# Patient Record
Sex: MALE | Race: WHITE | NOT HISPANIC OR LATINO | Employment: PART TIME | URBAN - METROPOLITAN AREA
[De-identification: names, ages, dates, MRNs, and addresses within clinical notes are randomized per-mention and may not be internally consistent; named-entity substitution may affect disease eponyms.]

---

## 2017-01-30 ENCOUNTER — OFFICE VISIT (OUTPATIENT)
Dept: FAMILY MEDICINE | Facility: CLINIC | Age: 52
End: 2017-01-30
Payer: COMMERCIAL

## 2017-01-30 VITALS
OXYGEN SATURATION: 98 % | HEART RATE: 97 BPM | DIASTOLIC BLOOD PRESSURE: 72 MMHG | WEIGHT: 180 LBS | HEIGHT: 68 IN | SYSTOLIC BLOOD PRESSURE: 130 MMHG | TEMPERATURE: 98.2 F | BODY MASS INDEX: 27.28 KG/M2

## 2017-01-30 DIAGNOSIS — Z12.11 SCREEN FOR COLON CANCER: ICD-10-CM

## 2017-01-30 DIAGNOSIS — Z13.220 NEED FOR LIPID SCREENING: ICD-10-CM

## 2017-01-30 DIAGNOSIS — Z13.1 SCREENING FOR DIABETES MELLITUS: ICD-10-CM

## 2017-01-30 DIAGNOSIS — Z12.5 SCREENING FOR PROSTATE CANCER: ICD-10-CM

## 2017-01-30 DIAGNOSIS — Z00.00 ENCOUNTER FOR ROUTINE ADULT HEALTH EXAMINATION WITHOUT ABNORMAL FINDINGS: Primary | ICD-10-CM

## 2017-01-30 DIAGNOSIS — L23.9 ALLERGIC CONTACT DERMATITIS, UNSPECIFIED TRIGGER: ICD-10-CM

## 2017-01-30 LAB
CHOLEST SERPL-MCNC: 117 MG/DL
GLUCOSE SERPL-MCNC: 95 MG/DL (ref 70–99)
HDLC SERPL-MCNC: 43 MG/DL
LDLC SERPL CALC-MCNC: 57 MG/DL
NONHDLC SERPL-MCNC: 74 MG/DL
PSA SERPL-ACNC: 2.08 UG/L (ref 0–4)
TRIGL SERPL-MCNC: 84 MG/DL

## 2017-01-30 PROCEDURE — 80061 LIPID PANEL: CPT | Performed by: FAMILY MEDICINE

## 2017-01-30 PROCEDURE — 99396 PREV VISIT EST AGE 40-64: CPT | Performed by: FAMILY MEDICINE

## 2017-01-30 PROCEDURE — 82947 ASSAY GLUCOSE BLOOD QUANT: CPT | Performed by: FAMILY MEDICINE

## 2017-01-30 PROCEDURE — G0103 PSA SCREENING: HCPCS | Performed by: FAMILY MEDICINE

## 2017-01-30 PROCEDURE — 36415 COLL VENOUS BLD VENIPUNCTURE: CPT | Performed by: FAMILY MEDICINE

## 2017-01-30 RX ORDER — MOMETASONE FUROATE 1 MG/G
OINTMENT TOPICAL
Qty: 45 G | Refills: 3 | Status: SHIPPED | OUTPATIENT
Start: 2017-01-30 | End: 2024-03-27

## 2017-01-30 NOTE — Clinical Note
[facility]  [address] [place] [address]    January 31, 2017    Pop Eaton III  [address] [address]          Dear Pop,    All of your lab tests are normal. Continue your healthy lifestyle.    Enclosed is a copy of your results.     Results for orders placed or performed in visit on 01/30/17   GLUCOSE   Result Value Ref Range    Glucose 95 70 - 99 mg/dL   Lipid panel reflex to direct LDL   Result Value Ref Range    Cholesterol 117 <200 mg/dL    Triglycerides 84 <150 mg/dL    HDL Cholesterol 43 >39 mg/dL    LDL Cholesterol Calculated 57 <100 mg/dL    Non HDL Cholesterol 74 <130 mg/dL   PSA, screen   Result Value Ref Range    PSA 2.08 0 - 4 ug/L       If you have any questions or concerns, please call myself or my nurse at [phone].      Sincerely,        Vandana Cole MD/KAPLAN

## 2017-01-30 NOTE — NURSING NOTE
"Chief Complaint   Patient presents with     Physical       Initial /72 mmHg  Pulse 97  Temp(Src) 98.2  F (36.8  C)  Ht 5' 7.9\" (1.725 m)  Wt 180 lb (81.647 kg)  BMI 27.44 kg/m2  SpO2 98% Estimated body mass index is 27.44 kg/(m^2) as calculated from the following:    Height as of this encounter: 5' 7.9\" (1.725 m).    Weight as of this encounter: 180 lb (81.647 kg).  BP completed using cuff size: kasey Jeffery MA      "

## 2017-01-30 NOTE — PATIENT INSTRUCTIONS
Preventive Health Recommendations  Male Ages 50   64    Yearly exam:             See your health care provider every year in order to  o   Review health changes.   o   Discuss preventive care.    o   Review your medicines if your doctor has prescribed any.     Have a cholesterol test every 5 years, or more frequently if you are at risk for high cholesterol/heart disease.     Have a diabetes test (fasting glucose) every three years. If you are at risk for diabetes, you should have this test more often.     Have a colonoscopy at age 50, or have a yearly FIT test (stool test). These exams will check for colon cancer.      Talk with your health care provider about whether or not a prostate cancer screening test (PSA) is right for you.    You should be tested each year for STDs (sexually transmitted diseases), if you re at risk.     Shots: Get a flu shot each year. Get a tetanus shot every 10 years.     Nutrition:    Eat at least 5 servings of fruits and vegetables daily.     Eat whole-grain bread, whole-wheat pasta and brown rice instead of white grains and rice.     Talk to your provider about Calcium and Vitamin D.     Lifestyle    Exercise for at least 150 minutes a week (30 minutes a day, 5 days a week). This will help you control your weight and prevent disease.     Limit alcohol to one drink per day.     No smoking.     Wear sunscreen to prevent skin cancer.     See your dentist every six months for an exam and cleaning.     See your eye doctor every 1 to 2 years.    [facility]    If you have any questions regarding to your visit please contact your care team:       Team Purple:   Clinic Hours Telephone Number   LETITIA Gupta Dr., Dr.   7am-7pm  Monday - Thursday   7am-5pm  Fridays  [phone]  (Appointment scheduling available 24/7)    Questions about your Visit?   Team Line:  [phone]   Urgent Care - [place]  [place] - 11am-9pm Monday-Friday Saturday-Sunday- 9am-5pm   [place] - 5pm-9pm Monday-Friday Saturday-Sunday- 9am-5pm  [phone] - [place]   [phone] - [place]       What options do I have for visits at the clinic other than the traditional office visit?  To expand how we care for you, many of our providers are utilizing electronic visits (e-visits) and telephone visits, when medically appropriate, for interactions with their patients rather than a visit in the clinic.   We also offer nurse visits for many medical concerns. Just like any other service, we will bill your insurance company for this type of visit based on time spent on the phone with your provider. Not all insurance companies cover these visits. Please check with your medical insurance if this type of visit is covered. You will be responsible for any charges that are not paid by your insurance.      E-visits via Dacentec:  generally incur a $35.00 fee.  Telephone visits:  Time spent on the phone: *charged based on time that is spent on the phone in increments of 10 minutes. Estimated cost:   5-10 mins $30.00   11-20 mins. $59.00   21-30 mins. $85.00     Use Gryphon Networkst (secure email communication and access to your chart) to send your primary care provider a message or make an appointment. Ask someone on your Team how to sign up for Dacentec.  For a Price Quote for your services, please call our Consumer Price Line at [phone].  As always, Thank you for trusting us with your health care needs!

## 2017-01-30 NOTE — MR AVS SNAPSHOT
After Visit Summary   1/30/2017    Pop Eaton III    MRN: [id number]           Patient Information     Date Of Birth          1965        Visit Information        Provider Department      1/30/2017 10:30 AM Vandana Cole MD [facility]        Today's Diagnoses     Encounter for routine adult health examination without abnormal findings    -  1     Screen for colon cancer         Screening for prostate cancer         Screening for diabetes mellitus         Allergic contact dermatitis, unspecified trigger         Need for lipid screening           Care Instructions      Preventive Health Recommendations  Male Ages 50 - 64    Yearly exam:             See your health care provider every year in order to  o   Review health changes.   o   Discuss preventive care.    o   Review your medicines if your doctor has prescribed any.     Have a cholesterol test every 5 years, or more frequently if you are at risk for high cholesterol/heart disease.     Have a diabetes test (fasting glucose) every three years. If you are at risk for diabetes, you should have this test more often.     Have a colonoscopy at age 50, or have a yearly FIT test (stool test). These exams will check for colon cancer.      Talk with your health care provider about whether or not a prostate cancer screening test (PSA) is right for you.    You should be tested each year for STDs (sexually transmitted diseases), if you re at risk.     Shots: Get a flu shot each year. Get a tetanus shot every 10 years.     Nutrition:    Eat at least 5 servings of fruits and vegetables daily.     Eat whole-grain bread, whole-wheat pasta and brown rice instead of white grains and rice.     Talk to your provider about Calcium and Vitamin D.     Lifestyle    Exercise for at least 150 minutes a week (30 minutes a day, 5 days a week). This will help you control your weight and prevent disease.     Limit alcohol to one drink per day.      No smoking.     Wear sunscreen to prevent skin cancer.     See your dentist every six months for an exam and cleaning.     See your eye doctor every 1 to 2 years.    [facility]    If you have any questions regarding to your visit please contact your care team:       Team Purple:   Clinic Hours Telephone Number   Dr. aVndana Domínguez, PA   7am-7pm  Monday - Thursday   7am-5pm  Fridays  [phone]  (Appointment scheduling available 24/7)    Questions about your Visit?   Team Line:  [phone]   Urgent Care - [place] - 11am-9pm Monday-Friday Saturday-Sunday- 9am-5pm   [place] - 5pm-9pm Monday-Friday Saturday-Sunday- 9am-5pm  [phone] - [place]   [phone] - [place]       What options do I have for visits at the clinic other than the traditional office visit?  To expand how we care for you, many of our providers are utilizing electronic visits (e-visits) and telephone visits, when medically appropriate, for interactions with their patients rather than a visit in the clinic.   We also offer nurse visits for many medical concerns. Just like any other service, we will bill your insurance company for this type of visit based on time spent on the phone with your provider. Not all insurance companies cover these visits. Please check with your medical insurance if this type of visit is covered. You will be responsible for any charges that are not paid by your insurance.      E-visits via Candid io:  generally incur a $35.00 fee.  Telephone visits:  Time spent on the phone: *charged based on time that is spent on the phone in increments of 10 minutes. Estimated cost:   5-10 mins $30.00   11-20 mins. $59.00   21-30 mins. $85.00     Use Candid io (secure email communication and access to your chart) to send your primary care provider a message or make an appointment. Ask someone on your Team how to sign up for  Solarmass.  For a Price Quote for your services, please call our Consumer Price Line at [phone].  As always, Thank you for trusting us with your health care needs!            Follow-ups after your visit        Additional Services     GASTROENTEROLOGY ADULT REF PROCEDURE ONLY       Last Lab Result: No results found for: CR  Body mass index is 27.44 kg/(m^2).      Patient will be contacted to schedule procedure.     Please be aware that coverage of these services is subject to the terms and limitations of your health insurance plan.  Call member services at your health plan with any benefit or coverage questions.  Any procedures must be performed at a [place] facility OR coordinated by your clinic's referral office.    Please bring the following with you to your appointment:    (1) Any X-Rays, CTs or MRIs which have been performed.  Contact the facility where they were done to arrange for  prior to your scheduled appointment.    (2) List of current medications   (3) This referral request   (4) Any documents/labs given to you for this referral                  Who to contact     If you have questions or need follow up information about today's clinic visit or your schedule please contact [facility] directly at [phone].  Normal or non-critical lab and imaging results will be communicated to you by IntegenXhart, letter or phone within 4 business days after the clinic has received the results. If you do not hear from us within 7 days, please contact the clinic through IntegenXhart or phone. If you have a critical or abnormal lab result, we will notify you by phone as soon as possible.  Submit refill requests through Solarmass or call your pharmacy and they will forward the refill request to us. Please allow 3 business days for your refill to be completed.          Additional Information About Your Visit        Solarmass Information     Solarmass lets you send messages to your doctor, view your test results,  "renew your prescriptions, schedule appointments and more. To sign up, go to [email or web address] . Click on \"Log in\" on the left side of the screen, which will take you to the Welcome page. Then click on \"Sign up Now\" on the right side of the page.     You will be asked to enter the access code listed below, as well as some personal information. Please follow the directions to create your username and password.     Your access code is: [email or web address]  Expires: 2017 11:11 AM     Your access code will  in 90 days. If you need help or a new code, please call your [place] clinic or [phone].        Care EveryWhere ID     This is your Care EveryWhere ID. This could be used by other organizations to access your [place] medical records  [id number]        Your Vitals Were     Pulse Temperature Height BMI (Body Mass Index) Pulse Oximetry       97 98.2  F (36.8  C) 5' 7.9\" (1.725 m) 27.44 kg/m2 98%        Blood Pressure from Last 3 Encounters:   17 130/72   16 118/74    Weight from Last 3 Encounters:   17 180 lb (81.647 kg)   16 170 lb (77.111 kg)              We Performed the Following     GASTROENTEROLOGY ADULT REF PROCEDURE ONLY     GLUCOSE     Lipid panel reflex to direct LDL     PSA, screen          Today's Medication Changes          These changes are accurate as of: 17 11:11 AM.  If you have any questions, ask your nurse or doctor.               Start taking these medicines.        Dose/Directions    mometasone 0.1 % ointment   Commonly known as:  ELOCON   Used for:  Allergic contact dermatitis, unspecified trigger   Started by:  Vandana Cole MD        Apply sparingly to affected area twice daily as needed.  Do not apply to face.   Quantity:  45 g   Refills:  3            Where to get your medicines      These medications were sent to [facility] [address] [place] [address] [place] [address] [address] [facility] [address] [facility] [address] [id number]     Phone:  [phone]    - mometasone 0.1 % ointment             Primary Care Provider    None Specified       No primary provider on file.        Thank you!     Thank you for choosing [facility]  for your care. Our goal is always to provide you with excellent care. Hearing back from our patients is one way we can continue to improve our services. Please take a few minutes to complete the written survey that you may receive in the mail after your visit with us. Thank you!             Your Updated Medication List - Protect others around you: Learn how to safely use, store and throw away your medicines at www.disposemymeds.org.          This list is accurate as of: 1/30/17 11:11 AM.  Always use your most recent med list.                   Brand Name Dispense Instructions for use    mometasone 0.1 % ointment    ELOCON    45 g    Apply sparingly to affected area twice daily as needed.  Do not apply to face.

## 2017-01-30 NOTE — PROGRESS NOTES
SUBJECTIVE:     CC: Pop Eaton III is an 51 year old male who presents for preventative health visit.     Healthy Habits:    Do you get at least three servings of calcium containing foods daily (dairy, green leafy vegetables, etc.)? yes    Amount of exercise or daily activities, outside of work: 7 day(s) per week    Problems taking medications regularly not applicable    Medication side effects: No    Have you had an eye exam in the past two years? yes    Do you see a dentist twice per year? yes    Do you have sleep apnea, excessive snoring or daytime drowsiness?no             Today's PHQ-2 Score:   PHQ-2 ( 1999 Pfizer) 1/30/2017 9/28/2016   Q1: Little interest or pleasure in doing things 0 0   Q2: Feeling down, depressed or hopeless 2 3   PHQ-2 Score 2 3       Abuse: Current or Past(Physical, Sexual or Emotional)- No  Do you feel safe in your environment - Yes    Social History   Substance Use Topics     Smoking status: Current Every Day Smoker     Smokeless tobacco: Never Used     Alcohol Use: No     The patient does not drink >3 drinks per day nor >7 drinks per week.    Last PSA: No results found for: PSA    Recent Labs   Lab Test 06/02/15   CHOL  138   HDL  33*   LDL  71   TRIG  170*   NHDL  105       Reviewed orders with patient. Reviewed health maintenance and updated orders accordingly - Yes    All Histories reviewed and updated in Epic.      ROS:  This 51 year old male is here today for annual male exam. He is aware that he is due for colonoscopy. 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 He hopes to get clearance to go back to work in about 1 week. It is a very physical job. He is requesting a PSA test. He has 2 maternal uncles with prostate cancer. His father is still living with no health issues.  He had a little cream in his coffee this morning.  All other review of systems are negative  Personal, family, and social  "history reviewed with patient and revised.    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 He also has been having very itchy skin around his anus lately. Keeps him awake at night.   E: NEGATIVE for vision changes or irritation  ENT: NEGATIVE for ear, mouth and throat problems  R: NEGATIVE for significant cough or SOB  CV: NEGATIVE for chest pain, palpitations or peripheral edema  GI: NEGATIVE for nausea, abdominal pain, heartburn, or change in bowel habits   male: negative for dysuria, hematuria, decreased urinary stream, erectile dysfunction, urethral discharge  M: NEGATIVE for significant arthralgias or myalgia  N: NEGATIVE for weakness, dizziness or paresthesias  P: NEGATIVE for changes in mood or affect    Problem list, Medication list, Allergies, and Medical/Social/Surgical histories reviewed in EPIC and updated as appropriate.  BP Readings from Last 3 Encounters:   01/30/17 130/72   09/28/16 118/74    Wt Readings from Last 3 Encounters:   01/30/17 180 lb (81.647 kg)   09/28/16 170 lb (77.111 kg)                  Patient Active Problem List   Diagnosis     S/P arthroscopic partial medial meniscectomy     Past Surgical History   Procedure Laterality Date     Laparoscopic appendectomy  2010     Laparoscopic cholecystectomy  2010     Arthroscopy knee rt/lt Left 2013     meniscal tear      Surgical history of -  Left 1995     corneal transplant X 2 on left eye       Social History   Substance Use Topics     Smoking status: Current Every Day Smoker     Smokeless tobacco: Never Used     Alcohol Use: No     Family History   Problem Relation Age of Onset     Prostate Cancer Maternal Uncle      X2         OBJECTIVE:     /72 mmHg  Pulse 97  Temp(Src) 98.2  F (36.8  C)  Ht 5' 7.9\" (1.725 m)  " Wt 180 lb (81.647 kg)  BMI 27.44 kg/m2  SpO2 98%  EXAM:  GENERAL: healthy, alert and no distress  EYES: Eyes grossly normal to inspection, PERRL and conjunctivae and sclerae normal  HENT: ear canals and TM's normal, nose and mouth without ulcers or lesions  NECK: no adenopathy, no asymmetry, masses, or scars and thyroid normal to palpation  RESP: lungs clear to auscultation - no rales, rhonchi or wheezes  CV: regular rate and rhythm, normal S1 S2, no S3 or S4, no murmur, click or rub, no peripheral edema and peripheral pulses strong  ABDOMEN: soft, nontender, no hepatosplenomegaly, no masses and bowel sounds normal  MS: no gross musculoskeletal defects noted, no edema  SKIN: no suspicious lesions or rashes, but has severe contact dermatitis with excoriations and scabs in the hairs around his anus, but not too close to his anus. He needs a steroid ointment to calm this down. He also has very thick white toenails and right thumb nail as noted above.   NEURO: Normal strength and tone, mentation intact and speech normal  PSYCH: mentation appears normal, affect normal/bright    ASSESSMENT/PLAN:     1. Encounter for routine adult health examination without abnormal findings  Healthy man     2. Screen for colon cancer  due  - GASTROENTEROLOGY ADULT REF PROCEDURE ONLY    3. Screening for prostate cancer  As above   - PSA, screen    4. Screening for diabetes mellitus  due  - GLUCOSE    5. Allergic contact dermatitis, unspecified trigger  As above   - mometasone (ELOCON) 0.1 % ointment; Apply sparingly to affected area twice daily as needed.  Do not apply to face.  Dispense: 45 g; Refill: 3    6. Need for lipid screening  Due, he had some cream in his coffee.   - Lipid panel reflex to direct LDL    COUNSELING:  Reviewed preventive health counseling, as reflected in patient instructions       Regular exercise       Healthy diet/nutrition         reports that he has been smoking.  He has never used smokeless  "tobacco.    Estimated body mass index is 27.44 kg/(m^2) as calculated from the following:    Height as of this encounter: 5' 7.9\" (1.725 m).    Weight as of this encounter: 180 lb (81.647 kg).   Weight management plan: Discussed healthy diet and exercise guidelines and patient will follow up in 12 months in clinic to re-evaluate.    Counseling Resources:  ATP IV Guidelines  Pooled Cohorts Equation Calculator  FRAX Risk Assessment  ICSI Preventive Guidelines  Dietary Guidelines for Americans, 2010  USDA's MyPlate  ASA Prophylaxis  Lung CA Screening    EB MANZANO MD  [facility]  "

## 2017-01-31 ASSESSMENT — PATIENT HEALTH QUESTIONNAIRE - PHQ9: SUM OF ALL RESPONSES TO PHQ QUESTIONS 1-9: 14

## 2017-02-15 ENCOUNTER — HOSPITAL ENCOUNTER (OUTPATIENT)
Facility: AMBULATORY SURGERY CENTER | Age: 52
Discharge: HOME | End: 2017-02-15
Attending: SURGERY | Admitting: SURGERY
Payer: COMMERCIAL

## 2017-02-15 ENCOUNTER — SURGERY (OUTPATIENT)
Age: 52
End: 2017-02-15

## 2017-02-15 VITALS
HEIGHT: 68 IN | DIASTOLIC BLOOD PRESSURE: 80 MMHG | RESPIRATION RATE: 16 BRPM | SYSTOLIC BLOOD PRESSURE: 130 MMHG | BODY MASS INDEX: 25.76 KG/M2 | OXYGEN SATURATION: 98 % | WEIGHT: 170 LBS | TEMPERATURE: 98.7 F

## 2017-02-15 PROBLEM — Z86.0100 HISTORY OF COLONIC POLYPS: Status: ACTIVE | Noted: 2017-02-15

## 2017-02-15 LAB — COLONOSCOPY: NORMAL

## 2017-02-15 PROCEDURE — G8907 PT DOC NO EVENTS ON DISCHARG: HCPCS

## 2017-02-15 PROCEDURE — 45385 COLONOSCOPY W/LESION REMOVAL: CPT | Mod: PT | Performed by: SURGERY

## 2017-02-15 PROCEDURE — 88305 TISSUE EXAM BY PATHOLOGIST: CPT | Performed by: SURGERY

## 2017-02-15 PROCEDURE — 45385 COLONOSCOPY W/LESION REMOVAL: CPT

## 2017-02-15 PROCEDURE — 45380 COLONOSCOPY AND BIOPSY: CPT | Mod: 59 | Performed by: SURGERY

## 2017-02-15 PROCEDURE — 45380 COLONOSCOPY AND BIOPSY: CPT | Mod: XS

## 2017-02-15 PROCEDURE — G8918 PT W/O PREOP ORDER IV AB PRO: HCPCS

## 2017-02-15 RX ORDER — ONDANSETRON 2 MG/ML
4 INJECTION INTRAMUSCULAR; INTRAVENOUS
Status: DISCONTINUED | OUTPATIENT
Start: 2017-02-15 | End: 2017-02-16 | Stop reason: HOSPADM

## 2017-02-15 RX ORDER — LIDOCAINE 40 MG/G
CREAM TOPICAL
Status: DISCONTINUED | OUTPATIENT
Start: 2017-02-15 | End: 2017-02-16 | Stop reason: HOSPADM

## 2017-02-15 RX ORDER — FENTANYL CITRATE 50 UG/ML
INJECTION, SOLUTION INTRAMUSCULAR; INTRAVENOUS PRN
Status: DISCONTINUED | OUTPATIENT
Start: 2017-02-15 | End: 2017-02-15 | Stop reason: HOSPADM

## 2017-02-15 RX ADMIN — FENTANYL CITRATE 50 MCG: 50 INJECTION, SOLUTION INTRAMUSCULAR; INTRAVENOUS at 11:47

## 2017-02-15 RX ADMIN — FENTANYL CITRATE 50 MCG: 50 INJECTION, SOLUTION INTRAMUSCULAR; INTRAVENOUS at 11:46

## 2017-02-15 RX ADMIN — FENTANYL CITRATE 50 MCG: 50 INJECTION, SOLUTION INTRAMUSCULAR; INTRAVENOUS at 11:55

## 2017-02-15 RX ADMIN — FENTANYL CITRATE 50 MCG: 50 INJECTION, SOLUTION INTRAMUSCULAR; INTRAVENOUS at 11:51

## 2017-02-15 RX ADMIN — FENTANYL CITRATE 50 MCG: 50 INJECTION, SOLUTION INTRAMUSCULAR; INTRAVENOUS at 12:13

## 2017-02-15 RX ADMIN — FENTANYL CITRATE 50 MCG: 50 INJECTION, SOLUTION INTRAMUSCULAR; INTRAVENOUS at 13:25

## 2017-02-17 LAB — COPATH REPORT: NORMAL

## 2017-04-11 NOTE — PROGRESS NOTES
"Chief Complaint   Patient presents with     Knee Pain     right knee pain     Diarrhea     has been going on for years     Allergies     SUBJECTIVE:  This 51 year old male is here today for several reasons:  1. He has had problems with diarrhea after eating for many years. He had colonoscopy with removal of polyps last fall and will need another colonoscopy in 3 years. No other pathology was found. 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 He deliberately doesn't eat lunch as he doesn't want to have diarrhea in people's homes. He wonders what can be done for that. He is able to sleep through the night without loose stools.   2. He has been seeing Dr. Russell for his right knee pain. He had arthroscopy, but that didn't help much. He was told that he has osteoarthritis and he has had about 3 steroid injections. Last one only helped about 2 days. Dr. Russell tried to get his insurance to cover another injection, but it was denied. Patient is taking 1960 mg tylenol every morning prior to work. That seems to help him get through the day. He has to do a lot of kneeling for his job.   3. He needs a refill of flonase for his seasonal allergies.   All other review of systems are negative  Personal, family, and social history reviewed with patient and revised.    OBJECTIVE:  Vital signs:  Temp: 98.2  F (36.8  C)   BP: 136/72 Pulse: 83     SpO2: 98 %     Height: 5' 8\" (172.7 cm) Weight: 177 lb (80.3 kg)  Estimated body mass index is 26.91 kg/(m^2) as calculated from the following:    Height as of this encounter: 5' 8\" (1.727 m).    Weight as of this encounter: 177 lb (80.3 kg).       patient has normal gait. No limp. No swelling noted in his right knee  Abdomen: soft and non-tender with no masses. No hepatosplenomegaly  Well hydrated  Well nourished  Well groomed  Alert and oriented X 3  Good spirits  Brisk gait   ASSESSMENT / PLAN:  (J30.2) Seasonal allergic rhinitis, unspecified allergic " rhinitis trigger  (primary encounter diagnosis)  Comment: as above   Plan: fluticasone (FLONASE) 50 MCG/ACT spray         Refilled     (K58.0) Irritable bowel syndrome with diarrhea  Comment: as above, he has never tried a bulk former  Plan: psyllium (METAMUCIL) 58.6 % POWD         Take one tablespoon daily.   Call me if that doesn't work, then I will change to cholestyramine powder titrated     (M17.11) Primary osteoarthritis of right knee  Comment: as above   Plan: acetaminophen (TYLENOL ARTHRITIS PAIN) 650 MG         CR tablet, naproxen (NAPROSYN) 500 MG tablet,         Basic metabolic panel             Patient needs to go back to see his orthopedist and see if he can get a synvisc injection. He needs to keep connected with him until he needs a knee replacement, which is not known when that will be.     EB MANZANO M.D.

## 2017-04-17 ENCOUNTER — OFFICE VISIT (OUTPATIENT)
Dept: FAMILY MEDICINE | Facility: CLINIC | Age: 52
End: 2017-04-17
Payer: COMMERCIAL

## 2017-04-17 VITALS
BODY MASS INDEX: 26.83 KG/M2 | DIASTOLIC BLOOD PRESSURE: 72 MMHG | TEMPERATURE: 98.2 F | SYSTOLIC BLOOD PRESSURE: 136 MMHG | WEIGHT: 177 LBS | HEART RATE: 83 BPM | OXYGEN SATURATION: 98 % | HEIGHT: 68 IN

## 2017-04-17 DIAGNOSIS — K58.0 IRRITABLE BOWEL SYNDROME WITH DIARRHEA: ICD-10-CM

## 2017-04-17 DIAGNOSIS — M17.11 PRIMARY OSTEOARTHRITIS OF RIGHT KNEE: ICD-10-CM

## 2017-04-17 DIAGNOSIS — J30.2 SEASONAL ALLERGIC RHINITIS, UNSPECIFIED ALLERGIC RHINITIS TRIGGER: Primary | ICD-10-CM

## 2017-04-17 LAB
ANION GAP SERPL CALCULATED.3IONS-SCNC: 8 MMOL/L (ref 3–14)
BUN SERPL-MCNC: 11 MG/DL (ref 7–30)
CALCIUM SERPL-MCNC: 8.7 MG/DL (ref 8.5–10.1)
CHLORIDE SERPL-SCNC: 108 MMOL/L (ref 94–109)
CO2 SERPL-SCNC: 26 MMOL/L (ref 20–32)
CREAT SERPL-MCNC: 1.09 MG/DL (ref 0.66–1.25)
GFR SERPL CREATININE-BSD FRML MDRD: 71 ML/MIN/1.7M2
GLUCOSE SERPL-MCNC: 85 MG/DL (ref 70–99)
POTASSIUM SERPL-SCNC: 4.1 MMOL/L (ref 3.4–5.3)
SODIUM SERPL-SCNC: 142 MMOL/L (ref 133–144)

## 2017-04-17 PROCEDURE — 36415 COLL VENOUS BLD VENIPUNCTURE: CPT | Performed by: FAMILY MEDICINE

## 2017-04-17 PROCEDURE — 99214 OFFICE O/P EST MOD 30 MIN: CPT | Performed by: FAMILY MEDICINE

## 2017-04-17 PROCEDURE — 80048 BASIC METABOLIC PNL TOTAL CA: CPT | Performed by: FAMILY MEDICINE

## 2017-04-17 RX ORDER — SENNOSIDES 8.6 MG
650 CAPSULE ORAL EVERY 8 HOURS PRN
Qty: 60 TABLET | COMMUNITY
Start: 2017-04-17 | End: 2020-04-29

## 2017-04-17 RX ORDER — NAPROXEN 500 MG/1
500 TABLET ORAL 2 TIMES DAILY WITH MEALS
Qty: 180 TABLET | Refills: 3 | Status: SHIPPED | OUTPATIENT
Start: 2017-04-17 | End: 2018-07-30

## 2017-04-17 RX ORDER — FLUTICASONE PROPIONATE 50 MCG
1 SPRAY, SUSPENSION (ML) NASAL DAILY
COMMUNITY
End: 2018-07-30

## 2017-04-17 RX ORDER — FLUTICASONE PROPIONATE 50 MCG
1-2 SPRAY, SUSPENSION (ML) NASAL DAILY
Qty: 3 BOTTLE | Refills: 11 | Status: SHIPPED | OUTPATIENT
Start: 2017-04-17 | End: 2024-03-27

## 2017-04-17 NOTE — NURSING NOTE
"Chief Complaint   Patient presents with     Knee Pain     right knee pain     Diarrhea     has been going on for years     Allergies       Initial /72 (BP Location: Right arm, Patient Position: Chair, Cuff Size: Adult Regular)  Pulse 83  Temp 98.2  F (36.8  C)  Ht 5' 8\" (1.727 m)  Wt 177 lb (80.3 kg)  SpO2 98%  BMI 26.91 kg/m2 Estimated body mass index is 26.91 kg/(m^2) as calculated from the following:    Height as of this encounter: 5' 8\" (1.727 m).    Weight as of this encounter: 177 lb (80.3 kg).  Medication Reconciliation: complete   Megan Jeffery MA      "

## 2017-04-17 NOTE — PATIENT INSTRUCTIONS
[facility]    If you have any questions regarding to your visit please contact your care team:       Team Purple:   Clinic Hours Telephone Number   LETITIA Gupta Dr., Dr.   7am-7pm  Monday - Thursday   7am-5pm  Fridays  [phone]  (Appointment scheduling available 24/7)    Questions about your Visit?   Team Line:  [phone]   Urgent Care - [place] and [facility] - 11am-9pm Monday-Friday Saturday-Sunday- 9am-5pm   [place] - 5pm-9pm Monday-Friday Saturday-Sunday- 9am-5pm  [phone] - [facility]  [phone] - [place]       What options do I have for visits at the clinic other than the traditional office visit?  To expand how we care for you, many of our providers are utilizing electronic visits (e-visits) and telephone visits, when medically appropriate, for interactions with their patients rather than a visit in the clinic.   We also offer nurse visits for many medical concerns. Just like any other service, we will bill your insurance company for this type of visit based on time spent on the phone with your provider. Not all insurance companies cover these visits. Please check with your medical insurance if this type of visit is covered. You will be responsible for any charges that are not paid by your insurance.      E-visits via AB Tasty:  generally incur a $35.00 fee.  Telephone visits:  Time spent on the phone: *charged based on time that is spent on the phone in increments of 10 minutes. Estimated cost:   5-10 mins $30.00   11-20 mins. $59.00   21-30 mins. $85.00     Use InMobit (secure email communication and access to your chart) to send your primary care provider a message or make an appointment. Ask someone on your Team how to sign up for AB Tasty.  For a Price Quote for your services, please call our Consumer Price Line at [phone].  As always, Thank you for trusting us with your health care needs!

## 2017-04-17 NOTE — MR AVS SNAPSHOT
After Visit Summary   4/17/2017    Pop Eaton III    MRN: [id number]           Patient Information     Date Of Birth          1965        Visit Information        Provider Department      4/17/2017 11:00 AM Vandana Cole MD [facility]        Today's Diagnoses     Seasonal allergic rhinitis, unspecified allergic rhinitis trigger    -  1    Irritable bowel syndrome with diarrhea        Primary osteoarthritis of right knee          Care Instructions    [facility]    If you have any questions regarding to your visit please contact your care team:       Team Purple:   Clinic Hours Telephone Number   LETITIA Gupta Dr., Dr.   7am-7pm  Monday - Thursday   7am-5pm  Fridays  [phone]  (Appointment scheduling available 24/7)    Questions about your Visit?   Team Line:  [phone]   Urgent Care - [place] and [facility] - 11am-9pm Monday-Friday Saturday-Sunday- 9am-5pm   [place] - 5pm-9pm Monday-Friday Saturday-Sunday- 9am-5pm  [phone] - [facility]  [phone] - [place]       What options do I have for visits at the clinic other than the traditional office visit?  To expand how we care for you, many of our providers are utilizing electronic visits (e-visits) and telephone visits, when medically appropriate, for interactions with their patients rather than a visit in the clinic.   We also offer nurse visits for many medical concerns. Just like any other service, we will bill your insurance company for this type of visit based on time spent on the phone with your provider. Not all insurance companies cover these visits. Please check with your medical insurance if this type of visit is covered. You will be responsible for any charges that are not paid by your insurance.      E-visits via Movli:  generally incur a $35.00 fee.  Telephone visits:  Time spent on the phone: *charged  "based on time that is spent on the phone in increments of 10 minutes. Estimated cost:   5-10 mins $30.00   11-20 mins. $59.00   21-30 mins. $85.00     Use Xceediumhart (secure email communication and access to your chart) to send your primary care provider a message or make an appointment. Ask someone on your Team how to sign up for Nykaat.  For a Price Quote for your services, please call our [place] Line at [phone].  As always, Thank you for trusting us with your health care needs!            Follow-ups after your visit        Who to contact     If you have questions or need follow up information about today's clinic visit or your schedule please contact [facility] directly at [phone].  Normal or non-critical lab and imaging results will be communicated to you by Xceediumhart, letter or phone within 4 business days after the clinic has received the results. If you do not hear from us within 7 days, please contact the clinic through Nykaat or phone. If you have a critical or abnormal lab result, we will notify you by phone as soon as possible.  Submit refill requests through SensorTran or call your pharmacy and they will forward the refill request to us. Please allow 3 business days for your refill to be completed.          Additional Information About Your Visit        SensorTran Information     SensorTran lets you send messages to your doctor, view your test results, renew your prescriptions, schedule appointments and more. To sign up, go to [email or web address] . Click on \"Log in\" on the left side of the screen, which will take you to the Welcome page. Then click on \"Sign up Now\" on the right side of the page.     You will be asked to enter the access code listed below, as well as some personal information. Please follow the directions to create your username and password.     Your access code is: [email or web address]  Expires: 2017 12:11 PM     Your access code will  in 90 days. If you need " "help or a new code, please call your [place] clinic or [phone].        Care EveryWhere ID     This is your Care EveryWhere ID. This could be used by other organizations to access your [place] medical records  [id number]        Your Vitals Were     Pulse Temperature Height Pulse Oximetry BMI (Body Mass Index)       83 98.2  F (36.8  C) 5' 8\" (1.727 m) 98% 26.91 kg/m2        Blood Pressure from Last 3 Encounters:   04/17/17 136/72   02/15/17 130/80   01/30/17 130/72    Weight from Last 3 Encounters:   04/17/17 177 lb (80.3 kg)   02/07/17 170 lb (77.1 kg)   01/30/17 180 lb (81.6 kg)              We Performed the Following     Basic metabolic panel          Today's Medication Changes          These changes are accurate as of: 4/17/17 11:34 AM.  If you have any questions, ask your nurse or doctor.               Start taking these medicines.        Dose/Directions    naproxen 500 MG tablet   Commonly known as:  NAPROSYN   Used for:  Primary osteoarthritis of right knee   Started by:  Vandana Cole MD        Dose:  500 mg   Take 1 tablet (500 mg) by mouth 2 times daily (with meals)   Quantity:  180 tablet   Refills:  3         These medicines have changed or have updated prescriptions.        Dose/Directions    * fluticasone 50 MCG/ACT spray   Commonly known as:  FLONASE   This may have changed:  Another medication with the same name was added. Make sure you understand how and when to take each.   Changed by:  Vandana Cole MD        Dose:  1 spray   Spray 1 spray into both nostrils daily   Refills:  0       * fluticasone 50 MCG/ACT spray   Commonly known as:  FLONASE   This may have changed:  You were already taking a medication with the same name, and this prescription was added. Make sure you understand how and when to take each.   Used for:  Seasonal allergic rhinitis, unspecified allergic rhinitis trigger   Changed by:  Vandana Cole MD        Dose:  1-2 spray   Spray 1-2 sprays into both " nostrils daily   Quantity:  3 Bottle   Refills:  11       * Notice:  This list has 2 medication(s) that are the same as other medications prescribed for you. Read the directions carefully, and ask your doctor or other care provider to review them with you.         Where to get your medicines      These medications were sent to [id number] - [facility] [address] AT [place] [address], [facility] [address]     Phone:  [phone]     fluticasone 50 MCG/ACT spray    naproxen 500 MG tablet                Primary Care Provider    None Specified       No primary provider on file.        Thank you!     Thank you for choosing [facility]  for your care. Our goal is always to provide you with excellent care. Hearing back from our patients is one way we can continue to improve our services. Please take a few minutes to complete the written survey that you may receive in the mail after your visit with us. Thank you!             Your Updated Medication List - Protect others around you: Learn how to safely use, store and throw away your medicines at www.disposemymeds.org.          This list is accurate as of: 4/17/17 11:34 AM.  Always use your most recent med list.                   Brand Name Dispense Instructions for use    * fluticasone 50 MCG/ACT spray    FLONASE     Spray 1 spray into both nostrils daily       * fluticasone 50 MCG/ACT spray    FLONASE    3 Bottle    Spray 1-2 sprays into both nostrils daily       mometasone 0.1 % ointment    ELOCON    45 g    Apply sparingly to affected area twice daily as needed.  Do not apply to face.       naproxen 500 MG tablet    NAPROSYN    180 tablet    Take 1 tablet (500 mg) by mouth 2 times daily (with meals)       psyllium 58.6 % Powd    METAMUCIL     Take one tablespoon daily       TYLENOL ARTHRITIS PAIN 650 MG CR tablet   Generic drug:  acetaminophen     60 tablet    Take 1 tablet (650 mg) by mouth every 8  hours as needed for mild pain or fever       * Notice:  This list has 2 medication(s) that are the same as other medications prescribed for you. Read the directions carefully, and ask your doctor or other care provider to review them with you.

## 2017-04-17 NOTE — LETTER
[facility]  [address] [place] [address]    April 18, 2017    Pop Derekgonzalez Eaton III  [address] [address]          Dear Pop,  Your blood tests are all normal. Continue your healthy lifestyle.   Enclosed is a copy of your results.     Results for orders placed or performed in visit on 04/17/17   Basic metabolic panel   Result Value Ref Range    Sodium 142 133 - 144 mmol/L    Potassium 4.1 3.4 - 5.3 mmol/L    Chloride 108 94 - 109 mmol/L    Carbon Dioxide 26 20 - 32 mmol/L    Anion Gap 8 3 - 14 mmol/L    Glucose 85 70 - 99 mg/dL    Urea Nitrogen 11 7 - 30 mg/dL    Creatinine 1.09 0.66 - 1.25 mg/dL    GFR Estimate 71 >60 mL/min/1.7m2    GFR Estimate If Black 86 >60 mL/min/1.7m2    Calcium 8.7 8.5 - 10.1 mg/dL       If you have any questions or concerns, please call myself or my nurse at [phone].      Sincerely,        Vandana Cole MD/pb

## 2017-10-12 ENCOUNTER — OFFICE VISIT (OUTPATIENT)
Dept: FAMILY MEDICINE | Facility: CLINIC | Age: 52
End: 2017-10-12
Payer: COMMERCIAL

## 2017-10-12 VITALS
BODY MASS INDEX: 27.46 KG/M2 | TEMPERATURE: 98 F | OXYGEN SATURATION: 95 % | SYSTOLIC BLOOD PRESSURE: 126 MMHG | DIASTOLIC BLOOD PRESSURE: 82 MMHG | HEART RATE: 82 BPM | WEIGHT: 180.6 LBS

## 2017-10-12 DIAGNOSIS — R20.0 BILATERAL FINGER NUMBNESS: Primary | ICD-10-CM

## 2017-10-12 PROCEDURE — 99213 OFFICE O/P EST LOW 20 MIN: CPT | Performed by: FAMILY MEDICINE

## 2017-10-12 NOTE — NURSING NOTE
"Chief Complaint   Patient presents with     Numbness     Both Hands and fingers       Initial /82  Pulse 82  Temp 98  F (36.7  C) (Oral)  Wt 180 lb 9.6 oz (81.9 kg)  SpO2 95%  BMI 27.46 kg/m2 Estimated body mass index is 27.46 kg/(m^2) as calculated from the following:    Height as of 4/17/17: 5' 8\" (1.727 m).    Weight as of this encounter: 180 lb 9.6 oz (81.9 kg).  Medication Reconciliation: complete     Vandana Paris MA    "

## 2017-10-12 NOTE — PROGRESS NOTES
SUBJECTIVE:   Pop Eaton III is a 52 year old male who presents to clinic today for the following health issues:      Numbness in both hands and fingers  1 week  Off and on and sometimes only in one hand at a time  Last 3 fingers and radiates down pinky side of palm  Therapies: None         Problem list and histories reviewed & adjusted, as indicated.  Additional history: as documented    Patient Active Problem List   Diagnosis     History of colonic polyps, 2/2017     Past Surgical History:   Procedure Laterality Date     ARTHROSCOPY KNEE RT/LT Left 2013    meniscal tear      ARTHROSCOPY KNEE RT/LT Right 10/2016    medial meniscal tear      COLONOSCOPY  02/2017    Q 3 years for polyps     COLONOSCOPY WITH CO2 INSUFFLATION N/A 2/15/2017    Procedure: COLONOSCOPY WITH CO2 INSUFFLATION;  Surgeon: Galileo Lezama DO;  Location: [facility] OR     LAPAROSCOPIC APPENDECTOMY  2010     LAPAROSCOPIC CHOLECYSTECTOMY  2010     SURGICAL HISTORY OF -  Left 1995    corneal transplant X 2 on left eye       Social History   Substance Use Topics     Smoking status: Current Every Day Smoker     Smokeless tobacco: Never Used     Alcohol use 0.0 oz/week     0 Standard drinks or equivalent per week      Comment: 1-2 drinks per month      Family History   Problem Relation Age of Onset     Prostate Cancer Maternal Uncle      X2         Current Outpatient Prescriptions   Medication Sig Dispense Refill     order for DME Bilateral wrist splints without thumb 2 each 0     fluticasone (FLONASE) 50 MCG/ACT spray Spray 1 spray into both nostrils daily       fluticasone (FLONASE) 50 MCG/ACT spray Spray 1-2 sprays into both nostrils daily 3 Bottle 11     acetaminophen (TYLENOL ARTHRITIS PAIN) 650 MG CR tablet Take 1 tablet (650 mg) by mouth every 8 hours as needed for mild pain or fever 60 tablet      naproxen (NAPROSYN) 500 MG tablet Take 1 tablet (500 mg) by mouth 2 times daily (with meals) 180 tablet 3     psyllium (METAMUCIL) 58.6 % POWD  Take one tablespoon daily       mometasone (ELOCON) 0.1 % ointment Apply sparingly to affected area twice daily as needed.  Do not apply to face. 45 g 3     BP Readings from Last 3 Encounters:   10/12/17 126/82   04/17/17 136/72   02/15/17 130/80    Wt Readings from Last 3 Encounters:   10/12/17 180 lb 9.6 oz (81.9 kg)   04/17/17 177 lb (80.3 kg)   02/07/17 170 lb (77.1 kg)                  Labs reviewed in EPIC        Reviewed and updated as needed this visit by clinical staff       Reviewed and updated as needed this visit by Provider         ROS:  This 52 year old male is here today because he is worried he could have carpal tunnel syndrome. He is right handed. He works burying and installing cable lines. He has developed intermittent numbness in his 3rd - 5th fingers bilaterally. The numbness will wake him up in the middle of the night. The right middle finger seems weaker at times. Sometimes the numbness goes into his 5th metacarpals.   He wonders when his next PSA test is due. He has 2 uncles who have had prostate cancer but his father is living and doesn't have it. All other review of systems are negative  Personal, family, and social history reviewed with patient and revised.         OBJECTIVE:     /82  Pulse 82  Temp 98  F (36.7  C) (Oral)  Wt 180 lb 9.6 oz (81.9 kg)  SpO2 95%  BMI 27.46 kg/m2  Body mass index is 27.46 kg/(m^2).  Patient has bilateral positive tinnel's sign: right worse than left.   No weakness of his fingers. No muscle wasting seen.   Good strong   Well hydrated  Well nourished  Well groomed      Diagnostic Test Results:  none     ASSESSMENT/PLAN:              1. Bilateral finger numbness  As above, most likely early carpal tunnel syndrome  - NEUROLOGY ADULT REFERRAL for EMG  - order for DME; Bilateral wrist splints without thumb  Dispense: 2 each; Refill: 0  He would like to try everything possible to avoid surgery. Splints were given. Depending on EMG results, I may refer  him to physical therapy   Return to clinic if no improvement   PSA due end of January, 2018    EB MANZANO MD  [facility]

## 2017-10-12 NOTE — PATIENT INSTRUCTIONS
[facility]    If you have any questions regarding to your visit please contact your care team:       Team Purple:   Clinic Hours Telephone Number   Dr. Vandana Daily     7am-7pm  Monday - Thursday   7am-5pm  Fridays  [phone]  (Appointment scheduling available 24/7)    Questions about your Visit?   Team Line:  [phone]   Urgent Care - [place] and [facility] - 11am-9pm Monday-Friday Saturday-Sunday- 9am-5pm   [place] - 5pm-9pm Monday-Friday Saturday-Sunday- 9am-5pm  [phone] - [facility]  [phone] - [place]       What options do I have for visits at the clinic other than the traditional office visit?  To expand how we care for you, many of our providers are utilizing electronic visits (e-visits) and telephone visits, when medically appropriate, for interactions with their patients rather than a visit in the clinic.   We also offer nurse visits for many medical concerns. Just like any other service, we will bill your insurance company for this type of visit based on time spent on the phone with your provider. Not all insurance companies cover these visits. Please check with your medical insurance if this type of visit is covered. You will be responsible for any charges that are not paid by your insurance.      E-visits via "[employer]:  generally incur a $35.00 fee.  Telephone visits:  Time spent on the phone: *charged based on time that is spent on the phone in increments of 10 minutes. Estimated cost:   5-10 mins $30.00   11-20 mins. $59.00   21-30 mins. $85.00     Use [employer] (secure email communication and access to your chart) to send your primary care provider a message or make an appointment. Ask someone on your Team how to sign up for "[employer].  For a Price Quote for your services, please call our Consumer Price Line at [phone].  As always, Thank you for trusting us with your health care needs!

## 2017-10-12 NOTE — MR AVS SNAPSHOT
After Visit Summary   10/12/2017    Pop Eaton III    MRN: [id number]           Patient Information     Date Of Birth          1965        Visit Information        Provider Department      10/12/2017 4:45 PM Vandana Cole MD [facility]        Today's Diagnoses     Bilateral finger numbness    -  1      Care Instructions    [facility]    If you have any questions regarding to your visit please contact your care team:       Team Purple:   Clinic Hours Telephone Number   Dr. Vandana Daily     7am-7pm  Monday - Thursday   7am-5pm  Fridays  [phone]  (Appointment scheduling available 24/7)    Questions about your Visit?   Team Line:  [phone]   Ur[facility] - [place] [facility] - 11am-9pm Monday-Friday Saturday-Sunday- 9am-5pm   [place] - 5pm-9pm Monday-Friday Saturday-Sunday- 9am-5pm  [phone] - [place]   [phone] - [place]       What options do I have for visits at the clinic other than the traditional office visit?  To expand how we care for you, many of our providers are utilizing electronic visits (e-visits) and telephone visits, when medically appropriate, for interactions with their patients rather than a visit in the clinic.   We also offer nurse visits for many medical concerns. Just like any other service, we will bill your insurance company for this type of visit based on time spent on the phone with your provider. Not all insurance companies cover these visits. Please check with your medical insurance if this type of visit is covered. You will be responsible for any charges that are not paid by your insurance.      E-visits via InstallFree:  generally incur a $35.00 fee.  Telephone visits:  Time spent on the phone: *charged based on time that is spent on the phone in increments of 10 minutes. Estimated cost:   5-10 mins $30.00   11-20 mins. $59.00   21-30 mins. $85.00      Use Bluechilli (secure email communication and access to your chart) to send your primary care provider a message or make an appointment. Ask someone on your Team how to sign up for Bluechilli.  For a Price Quote for your services, please call our Consumer Price Line at [phone].  As always, Thank you for trusting us with your health care needs!              Follow-ups after your visit        Additional Services     NEUROLOGY ADULT REFERRAL       Your provider has referred you to: [facility]: [facility] [phone]   [email or web address]  Needs bilateral hand EMG's to rule out carpal tunnel syndrome         Please be aware that coverage of these services is subject to the terms and limitations of your health insurance plan.  Call member services at your health plan with any benefit or coverage questions.      Please bring the following to your appointment:    >>   Any x-rays, CTs or MRIs which have been performed.  Contact the facility where they were done to arrange for  prior to your scheduled appointment.  Any new CT, MRI or other procedures ordered by your specialist must be performed at a [place] facility or coordinated by your clinic's referral office.    >>   List of current medications   >>   This referral request   >>   Any documents/labs given to you for this referral                  Who to contact     If you have questions or need follow up information about today's clinic visit or your schedule please contact [facility] directly at [phone].  Normal or non-critical lab and imaging results will be communicated to you by MyChart, letter or phone within 4 business days after the clinic has received the results. If you do not hear from us within 7 days, please contact the clinic through MyChart or phone. If you have a critical or abnormal lab result, we will notify you by phone as soon as  "possible.  Submit refill requests through SuperDerivatives or call your pharmacy and they will forward the refill request to us. Please allow 3 business days for your refill to be completed.          Additional Information About Your Visit        [employer] Information     SuperDerivatives lets you send messages to your doctor, view your test results, renew your prescriptions, schedule appointments and more. To sign up, go to [email or web address] . Click on \"Log in\" on the left side of the screen, which will take you to the Welcome page. Then click on \"Sign up Now\" on the right side of the page.     You will be asked to enter the access code listed below, as well as some personal information. Please follow the directions to create your username and password.     Your access code is: [email or web address]  Expires: 1/10/2018  4:51 PM     Your access code will  in 90 days. If you need help or a new code, please call your [place] clinic or [phone].        Care EveryWhere ID     This is your Care EveryWhere ID. This could be used by other organizations to access your [place] medical records  [id number]        Your Vitals Were     Pulse Temperature Pulse Oximetry BMI (Body Mass Index)          82 98  F (36.7  C) (Oral) 95% 27.46 kg/m2         Blood Pressure from Last 3 Encounters:   10/12/17 126/82   17 136/72   02/15/17 130/80    Weight from Last 3 Encounters:   10/12/17 180 lb 9.6 oz (81.9 kg)   17 177 lb (80.3 kg)   17 170 lb (77.1 kg)              We Performed the Following     NEUROLOGY ADULT REFERRAL        Primary Care Provider Office Phone # Fax #    Vandana Cole [phone]       [address] [address]        Equal Access to Services     DARLENE MONTILLA : Analisa Smith, taj mortensen, qatay branch. [facility] [phone].    ATENCIÓN: Si basilai cifuentes " disposición servicios gratuitos de asistencia lingüística. Yaneli azul [phone].    We comply with applicable federal civil rights laws and [place] laws. We do not discriminate on the basis of race, color, national origin, age, disability, sex, sexual orientation, or gender identity.            Thank you!     Thank you for choosing [facility]  for your care. Our goal is always to provide you with excellent care. Hearing back from our patients is one way we can continue to improve our services. Please take a few minutes to complete the written survey that you may receive in the mail after your visit with us. Thank you!             Your Updated Medication List - Protect others around you: Learn how to safely use, store and throw away your medicines at www.disposemymeds.org.          This list is accurate as of: 10/12/17  4:51 PM.  Always use your most recent med list.                   Brand Name Dispense Instructions for use Diagnosis    * fluticasone 50 MCG/ACT spray    FLONASE     Spray 1 spray into both nostrils daily        * fluticasone 50 MCG/ACT spray    FLONASE    3 Bottle    Spray 1-2 sprays into both nostrils daily    Seasonal allergic rhinitis, unspecified allergic rhinitis trigger       mometasone 0.1 % ointment    ELOCON    45 g    Apply sparingly to affected area twice daily as needed.  Do not apply to face.    Allergic contact dermatitis, unspecified trigger       naproxen 500 MG tablet    NAPROSYN    180 tablet    Take 1 tablet (500 mg) by mouth 2 times daily (with meals)    Primary osteoarthritis of right knee       psyllium 58.6 % Powd    METAMUCIL     Take one tablespoon daily    Irritable bowel syndrome with diarrhea       TYLENOL ARTHRITIS PAIN 650 MG CR tablet   Generic drug:  acetaminophen     60 tablet    Take 1 tablet (650 mg) by mouth every 8 hours as needed for mild pain or fever    Primary osteoarthritis of right knee       * Notice:  This list has 2 medication(s) that are  the same as other medications prescribed for you. Read the directions carefully, and ask your doctor or other care provider to review them with you.

## 2018-07-24 NOTE — PROGRESS NOTES
SUBJECTIVE:   Pop Eaton III is a 52 year old male who presents to clinic today for the following health issues:      Patient presents with:  Insomnia  Other: no appetite   Confusion  Anxiety: ongoing at least 1 yrs and the last 2 months getting worse              Problem list and histories reviewed & adjusted, as indicated.  Additional history: as documented    Patient Active Problem List   Diagnosis     History of colonic polyps, 2/2017     Past Surgical History:   Procedure Laterality Date     ARTHROSCOPY KNEE RT/LT Left 2013    meniscal tear      ARTHROSCOPY KNEE RT/LT Right 10/2016    medial meniscal tear      COLONOSCOPY  02/2017    Q 3 years for polyps     COLONOSCOPY WITH CO2 INSUFFLATION N/A 2/15/2017    Procedure: COLONOSCOPY WITH CO2 INSUFFLATION;  Surgeon: Galileo Lezama DO;  Location: [facility] OR     LAPAROSCOPIC APPENDECTOMY  2010     LAPAROSCOPIC CHOLECYSTECTOMY  2010     SURGICAL HISTORY OF -  Left 1995    corneal transplant X 2 on left eye       Social History   Substance Use Topics     Smoking status: Current Every Day Smoker     Smokeless tobacco: Never Used     Alcohol use 0.0 oz/week     0 Standard drinks or equivalent per week      Comment: 1-2 drinks per month      Family History   Problem Relation Age of Onset     Prostate Cancer Maternal Uncle      X2         Current Outpatient Prescriptions   Medication Sig Dispense Refill     acetaminophen (TYLENOL ARTHRITIS PAIN) 650 MG CR tablet Take 1 tablet (650 mg) by mouth every 8 hours as needed for mild pain or fever 60 tablet      fluticasone (FLONASE) 50 MCG/ACT spray Spray 1-2 sprays into both nostrils daily 3 Bottle 11     mometasone (ELOCON) 0.1 % ointment Apply sparingly to affected area twice daily as needed.  Do not apply to face. 45 g 3     psyllium (METAMUCIL) 58.6 % POWD Take one tablespoon daily       No Known Allergies  BP Readings from Last 3 Encounters:   07/30/18 130/60   10/12/17 126/82   04/17/17 136/72    Wt Readings from  "Last 3 Encounters:   07/30/18 173 lb (78.5 kg)   10/12/17 180 lb 9.6 oz (81.9 kg)   04/17/17 177 lb (80.3 kg)                  Labs reviewed in EPIC    Reviewed and updated as needed this visit by clinical staff       Reviewed and updated as needed this visit by Provider         ROS:  This 52 year old male is here today because he has had a change in his mental status over the past year. Prior to this, both his and his wife's daughters struggled with heroin abuse. They thought that was going to be the worst thing that they were encountering. Both daughters are doing well now and are sober. Over the past year, his wife has noticed that he has become more anxious, figity, and having restless legs while sleeping and sitting. He admits that he is sleeping poorly. Some nightmares but attributes that to his time in the . He jumped out of planes but doesn't recall any head injuries. He did play a lot of hockey in high school back when concussions were not as well recognized. His is most concerned about his memory loss. He now is using his GPS more to find his way home in areas that he is familiar with. 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 To his knowledge, he is not making any mistakes at his job but he is aware that he has to be much more alert to avoid making mistakes. Wife says his body shakes while he sleeps to the point of \"convulsing\". She denies any sleep apnea. He has no bedwetting issues. He thinks it is just a ramping up of his anxiety, but he isn't sure. He feels extremely fatigued at the end of the day. This is new for him also. Wonders how much longer he can do his physical job.  All other review of systems are negative  Personal, family, and social history reviewed with patient and revised.         OBJECTIVE:     /60  Pulse 66  Temp 98.4  F (36.9  C) (Oral)  Resp 16  Ht 5' 8.23\" (1.733 m)  Wt 173 lb (78.5 kg)  SpO2 97%  BMI 26.13 " kg/m2  Body mass index is 26.13 kg/(m^2).  GENERAL: healthy, alert and no distress  EYES: Eyes grossly normal to inspection, PERRL and conjunctivae and sclerae normal  HENT: ear canals and TM's normal, nose and mouth without ulcers or lesions  NECK: no adenopathy, no asymmetry, masses, or scars and thyroid normal to palpation  RESP: lungs clear to auscultation - no rales, rhonchi or wheezes  CV: regular rate and rhythm, normal S1 S2, no S3 or S4, no murmur, click or rub, no peripheral edema and peripheral pulses strong  ABDOMEN: soft, slim   MS: no gross musculoskeletal defects noted, no edema  Mild feet twitching while sitting in chair.   Overall, not anxious with his speech or demeanor.   Well hydrated  Well nourished  Well groomed  Alert and oriented X 3  Good spirits  Brisk gait with no shortness of breath     Diagnostic Test Results:  none     ASSESSMENT/PLAN:              1. Memory loss  As above   - Comprehensive metabolic panel  - MR Brain w/o Contrast; Future    2. Restless leg syndrome  As above   - Magnesium  - Comprehensive metabolic panel  - TSH with free T4 reflex  - MR Brain w/o Contrast; Future    3. Insomnia, unspecified type  As above   - MR Brain w/o Contrast; Future    4. Anxiety  As above   - MR Brain w/o Contrast; Future    5. Fatigue, unspecified type  As above   - CBC with platelets differential  - Comprehensive metabolic panel  - TSH with free T4 reflex  - MR Brain w/o Contrast; Future    Return to clinic if no improvement   If all above are normal, I will order sleep study and EEG.     EB MANZANO MD  [facility]

## 2018-07-30 ENCOUNTER — OFFICE VISIT (OUTPATIENT)
Dept: FAMILY MEDICINE | Facility: CLINIC | Age: 53
End: 2018-07-30
Payer: COMMERCIAL

## 2018-07-30 VITALS
RESPIRATION RATE: 16 BRPM | DIASTOLIC BLOOD PRESSURE: 60 MMHG | TEMPERATURE: 98.4 F | HEART RATE: 66 BPM | BODY MASS INDEX: 26.22 KG/M2 | OXYGEN SATURATION: 97 % | WEIGHT: 173 LBS | HEIGHT: 68 IN | SYSTOLIC BLOOD PRESSURE: 130 MMHG

## 2018-07-30 DIAGNOSIS — R53.83 FATIGUE, UNSPECIFIED TYPE: ICD-10-CM

## 2018-07-30 DIAGNOSIS — G47.00 INSOMNIA, UNSPECIFIED TYPE: ICD-10-CM

## 2018-07-30 DIAGNOSIS — R41.3 MEMORY LOSS: Primary | ICD-10-CM

## 2018-07-30 DIAGNOSIS — G25.81 RESTLESS LEG SYNDROME: ICD-10-CM

## 2018-07-30 DIAGNOSIS — F41.9 ANXIETY: ICD-10-CM

## 2018-07-30 LAB
ALBUMIN SERPL-MCNC: 3.8 G/DL (ref 3.4–5)
ALP SERPL-CCNC: 115 U/L (ref 40–150)
ALT SERPL W P-5'-P-CCNC: 41 U/L (ref 0–70)
ANION GAP SERPL CALCULATED.3IONS-SCNC: 8 MMOL/L (ref 3–14)
AST SERPL W P-5'-P-CCNC: 31 U/L (ref 0–45)
BASOPHILS # BLD AUTO: 0.1 10E9/L (ref 0–0.2)
BASOPHILS NFR BLD AUTO: 0.5 %
BILIRUB SERPL-MCNC: 0.4 MG/DL (ref 0.2–1.3)
BUN SERPL-MCNC: 9 MG/DL (ref 7–30)
CALCIUM SERPL-MCNC: 9.2 MG/DL (ref 8.5–10.1)
CHLORIDE SERPL-SCNC: 105 MMOL/L (ref 94–109)
CO2 SERPL-SCNC: 29 MMOL/L (ref 20–32)
CREAT SERPL-MCNC: 1.1 MG/DL (ref 0.66–1.25)
DIFFERENTIAL METHOD BLD: ABNORMAL
EOSINOPHIL # BLD AUTO: 0.3 10E9/L (ref 0–0.7)
EOSINOPHIL NFR BLD AUTO: 1.8 %
ERYTHROCYTE [DISTWIDTH] IN BLOOD BY AUTOMATED COUNT: 17.7 % (ref 10–15)
GFR SERPL CREATININE-BSD FRML MDRD: 70 ML/MIN/1.7M2
GLUCOSE SERPL-MCNC: 119 MG/DL (ref 70–99)
HCT VFR BLD AUTO: 49.4 % (ref 40–53)
HGB BLD-MCNC: 17.1 G/DL (ref 13.3–17.7)
LYMPHOCYTES # BLD AUTO: 3.5 10E9/L (ref 0.8–5.3)
LYMPHOCYTES NFR BLD AUTO: 21.3 %
MAGNESIUM SERPL-MCNC: 2.5 MG/DL (ref 1.6–2.3)
MCH RBC QN AUTO: 28.4 PG (ref 26.5–33)
MCHC RBC AUTO-ENTMCNC: 34.6 G/DL (ref 31.5–36.5)
MCV RBC AUTO: 82 FL (ref 78–100)
MONOCYTES # BLD AUTO: 1 10E9/L (ref 0–1.3)
MONOCYTES NFR BLD AUTO: 6.2 %
NEUTROPHILS # BLD AUTO: 11.4 10E9/L (ref 1.6–8.3)
NEUTROPHILS NFR BLD AUTO: 70.2 %
PLATELET # BLD AUTO: 184 10E9/L (ref 150–450)
POTASSIUM SERPL-SCNC: 4.1 MMOL/L (ref 3.4–5.3)
PROT SERPL-MCNC: 7.6 G/DL (ref 6.8–8.8)
RBC # BLD AUTO: 6.02 10E12/L (ref 4.4–5.9)
SODIUM SERPL-SCNC: 142 MMOL/L (ref 133–144)
TSH SERPL DL<=0.005 MIU/L-ACNC: 1.67 MU/L (ref 0.4–4)
WBC # BLD AUTO: 16.3 10E9/L (ref 4–11)

## 2018-07-30 PROCEDURE — 84443 ASSAY THYROID STIM HORMONE: CPT | Performed by: FAMILY MEDICINE

## 2018-07-30 PROCEDURE — 80053 COMPREHEN METABOLIC PANEL: CPT | Performed by: FAMILY MEDICINE

## 2018-07-30 PROCEDURE — 99214 OFFICE O/P EST MOD 30 MIN: CPT | Performed by: FAMILY MEDICINE

## 2018-07-30 PROCEDURE — 85025 COMPLETE CBC W/AUTO DIFF WBC: CPT | Performed by: FAMILY MEDICINE

## 2018-07-30 PROCEDURE — 83735 ASSAY OF MAGNESIUM: CPT | Performed by: FAMILY MEDICINE

## 2018-07-30 PROCEDURE — 36415 COLL VENOUS BLD VENIPUNCTURE: CPT | Performed by: FAMILY MEDICINE

## 2018-07-30 ASSESSMENT — ANXIETY QUESTIONNAIRES
1. FEELING NERVOUS, ANXIOUS, OR ON EDGE: NEARLY EVERY DAY
2. NOT BEING ABLE TO STOP OR CONTROL WORRYING: MORE THAN HALF THE DAYS
5. BEING SO RESTLESS THAT IT IS HARD TO SIT STILL: MORE THAN HALF THE DAYS
7. FEELING AFRAID AS IF SOMETHING AWFUL MIGHT HAPPEN: NEARLY EVERY DAY
3. WORRYING TOO MUCH ABOUT DIFFERENT THINGS: MORE THAN HALF THE DAYS
IF YOU CHECKED OFF ANY PROBLEMS ON THIS QUESTIONNAIRE, HOW DIFFICULT HAVE THESE PROBLEMS MADE IT FOR YOU TO DO YOUR WORK, TAKE CARE OF THINGS AT HOME, OR GET ALONG WITH OTHER PEOPLE: VERY DIFFICULT
6. BECOMING EASILY ANNOYED OR IRRITABLE: MORE THAN HALF THE DAYS
GAD7 TOTAL SCORE: 17

## 2018-07-30 ASSESSMENT — PATIENT HEALTH QUESTIONNAIRE - PHQ9: 5. POOR APPETITE OR OVEREATING: NEARLY EVERY DAY

## 2018-07-30 NOTE — LETTER
[facility]  [address] [place] [address]. [place] [address]    July 30, 2018    Popgladis Eaton III  [address] [address]          Dear Pop,    Your white blood count and red blood count are quite high. Please make an appointment to see me after your MRI is complete so we can discuss possible further testing    Enclosed is a copy of your results.     Results for orders placed or performed in visit on 07/30/18   CBC with platelets differential   Result Value Ref Range    WBC 16.3 (H) 4.0 - 11.0 10e9/L    RBC Count 6.02 (H) 4.4 - 5.9 10e12/L    Hemoglobin 17.1 13.3 - 17.7 g/dL    Hematocrit 49.4 40.0 - 53.0 %    MCV 82 78 - 100 fl    MCH 28.4 26.5 - 33.0 pg    MCHC 34.6 31.5 - 36.5 g/dL    RDW 17.7 (H) 10.0 - 15.0 %    Platelet Count 184 150 - 450 10e9/L    Diff Method Automated Method     % Neutrophils 70.2 %    % Lymphocytes 21.3 %    % Monocytes 6.2 %    % Eosinophils 1.8 %    % Basophils 0.5 %    Absolute Neutrophil 11.4 (H) 1.6 - 8.3 10e9/L    Absolute Lymphocytes 3.5 0.8 - 5.3 10e9/L    Absolute Monocytes 1.0 0.0 - 1.3 10e9/L    Absolute Eosinophils 0.3 0.0 - 0.7 10e9/L    Absolute Basophils 0.1 0.0 - 0.2 10e9/L   Magnesium   Result Value Ref Range    Magnesium 2.5 (H) 1.6 - 2.3 mg/dL   Comprehensive metabolic panel   Result Value Ref Range    Sodium 142 133 - 144 mmol/L    Potassium 4.1 3.4 - 5.3 mmol/L    Chloride 105 94 - 109 mmol/L    Carbon Dioxide 29 20 - 32 mmol/L    Anion Gap 8 3 - 14 mmol/L    Glucose 119 (H) 70 - 99 mg/dL    Urea Nitrogen 9 7 - 30 mg/dL    Creatinine 1.10 0.66 - 1.25 mg/dL    GFR Estimate 70 >60 mL/min/1.7m2    GFR Estimate If Black 85 >60 mL/min/1.7m2    Calcium 9.2 8.5 - 10.1 mg/dL    Bilirubin Total 0.4 0.2 - 1.3 mg/dL    Albumin 3.8 3.4 - 5.0 g/dL    Protein Total 7.6 6.8 - 8.8 g/dL    Alkaline Phosphatase 115 40 - 150 U/L    ALT 41 0 - 70 U/L    AST 31 0 - 45 U/L   TSH with free T4 reflex    Result Value Ref Range    TSH 1.67 0.40 - 4.00 mU/L       If you have any questions or concerns, please call myself or my nurse at [phone].      Sincerely,        Vandana Cole MD/zelda

## 2018-07-30 NOTE — PATIENT INSTRUCTIONS
[facility]    If you have any questions regarding to your visit please contact your care team:       Team Purple:   Clinic Hours Telephone Number   Dr. Vandana Vogt   7am-7pm  Monday - Thursday   7am-5pm  Fridays  [phone]  (Appointment scheduling available 24/7)    Questions about your recent visit?   Team Line:  [phone]   Urgent Care - [place] and [facility] - 11am-9pm Monday-Friday Saturday-Sunday- 9am-5pm   [place] - 5pm-9pm Monday-Friday Saturday-Sunday- 9am-5pm  [phone] - [place]  [phone] [facility]       What options do I have for a visit other than an office visit? We offer electronic visits (e-visits) and telephone visits, when medically appropriate.  Please check with your medical insurance to see if these types of visits are covered, as you will be responsible for any charges that are not paid by your insurance.      You can use [employer] (secure electronic communication) to access to your chart, send your primary care provider a message, or make an appointment. Ask a team member how to get started.     For a price quote for your services, please call our Consumer Price Line at [phone] or our Imaging Cost estimation line at [phone] (for imaging tests).

## 2018-07-30 NOTE — MR AVS SNAPSHOT
After Visit Summary   7/30/2018    Pop Eaton III    MRN: [id number]           Patient Information     Date Of Birth          1965        Visit Information        Provider Department      7/30/2018 10:00 AM Vandana Cole MD [facility]        Today's Diagnoses     Memory loss    -  1    Restless leg syndrome        Insomnia, unspecified type        Anxiety        Fatigue, unspecified type          Care Instructions    [facility]    If you have any questions regarding to your visit please contact your care team:       Team Purple:   Clinic Hours Telephone Number   Dr. Vandana Vogt   7am-7pm  Monday - Thursday   7am-5pm  Fridays  [phone]  (Appointment scheduling available 24/7)    Questions about your recent visit?   Team Line:  [phone]   Urgent Care - [place] and [facility] - 11am-9pm Monday-Friday Saturday-Sunday- 9am-5pm   [place] - 5pm-9pm Monday-Friday Saturday-Sunday- 9am-5pm  [phone] - [place]  [phone] - [place]       What options do I have for a visit other than an office visit? We offer electronic visits (e-visits) and telephone visits, when medically appropriate.  Please check with your medical insurance to see if these types of visits are covered, as you will be responsible for any charges that are not paid by your insurance.      You can use Ujogo (secure electronic communication) to access to your chart, send your primary care provider a message, or make an appointment. Ask a team member how to get started.     For a price quote for your services, please call our Consumer Price Line at [phone] or our Imaging Cost estimation line at [phone] (for imaging tests).              Follow-ups after your visit        Future tests that were ordered for you today     Open Future Orders        Priority Expected Expires Ordered    MR Brain w/o Contrast  "Routine  7/30/2019 7/30/2018            Who to contact     If you have questions or need follow up information about today's clinic visit or your schedule please contact [facility] directly at [phone].  Normal or non-critical lab and imaging results will be communicated to you by MyChart, letter or phone within 4 business days after the clinic has received the results. If you do not hear from us within 7 days, please contact the clinic through MyChart or phone. If you have a critical or abnormal lab result, we will notify you by phone as soon as possible.  Submit refill requests through Cognitics or call your pharmacy and they will forward the refill request to us. Please allow 3 business days for your refill to be completed.          Additional Information About Your Visit        Care EveryWhere ID     This is your Care EveryWhere ID. This could be used by other organizations to access your [place] medical records  [id number]        Your Vitals Were     Pulse Temperature Respirations Height Pulse Oximetry BMI (Body Mass Index)    66 98.4  F (36.9  C) (Oral) 16 5' 8.23\" (1.733 m) 97% 26.13 kg/m2       Blood Pressure from Last 3 Encounters:   07/30/18 130/60   10/12/17 126/82   04/17/17 136/72    Weight from Last 3 Encounters:   07/30/18 173 lb (78.5 kg)   10/12/17 180 lb 9.6 oz (81.9 kg)   04/17/17 177 lb (80.3 kg)              We Performed the Following     CBC with platelets differential     Comprehensive metabolic panel     Magnesium     TSH with free T4 reflex        Primary Care Provider Office Phone # Fax #    Vandana Cole [phone] [address] [facility] [address]        Equal Access to Services     JAMIA [facility] GulfportISIDORO : Analisa Smith, taj mortensen, noemí moorealunruly perez, tay herndon. [facility] [phone].    ATENCIÓN: Si habla español, tiene a nina disposición servicios gratuitos de asistencia " lingüísticaMarcelle Groves al [phone].    We comply with applicable federal civil rights laws and [place] laws. We do not discriminate on the basis of race, color, national origin, age, disability, sex, sexual orientation, or gender identity.            Thank you!     Thank you for choosing [facility]  for your care. Our goal is always to provide you with excellent care. Hearing back from our patients is one way we can continue to improve our services. Please take a few minutes to complete the written survey that you may receive in the mail after your visit with us. Thank you!             Your Updated Medication List - Protect others around you: Learn how to safely use, store and throw away your medicines at www.disposemymeds.org.          This list is accurate as of 7/30/18 10:28 AM.  Always use your most recent med list.                   Brand Name Dispense Instructions for use Diagnosis    fluticasone 50 MCG/ACT spray    FLONASE    3 Bottle    Spray 1-2 sprays into both nostrils daily    Seasonal allergic rhinitis, unspecified allergic rhinitis trigger       mometasone 0.1 % ointment    ELOCON    45 g    Apply sparingly to affected area twice daily as needed.  Do not apply to face.    Allergic contact dermatitis, unspecified trigger       psyllium 58.6 % Powd    METAMUCIL     Take one tablespoon daily    Irritable bowel syndrome with diarrhea       TYLENOL ARTHRITIS PAIN 650 MG CR tablet   Generic drug:  acetaminophen     60 tablet    Take 1 tablet (650 mg) by mouth every 8 hours as needed for mild pain or fever    Primary osteoarthritis of right knee

## 2018-07-31 ASSESSMENT — ANXIETY QUESTIONNAIRES: GAD7 TOTAL SCORE: 17

## 2018-07-31 ASSESSMENT — PATIENT HEALTH QUESTIONNAIRE - PHQ9: SUM OF ALL RESPONSES TO PHQ QUESTIONS 1-9: 13

## 2018-08-06 ENCOUNTER — RADIANT APPOINTMENT (OUTPATIENT)
Dept: MRI IMAGING | Facility: CLINIC | Age: 53
End: 2018-08-06
Attending: FAMILY MEDICINE
Payer: COMMERCIAL

## 2018-08-06 DIAGNOSIS — R53.83 FATIGUE, UNSPECIFIED TYPE: ICD-10-CM

## 2018-08-06 DIAGNOSIS — F41.9 ANXIETY: ICD-10-CM

## 2018-08-06 DIAGNOSIS — G25.81 RESTLESS LEG SYNDROME: ICD-10-CM

## 2018-08-06 DIAGNOSIS — R41.3 MEMORY LOSS: ICD-10-CM

## 2018-08-06 DIAGNOSIS — G47.00 INSOMNIA, UNSPECIFIED TYPE: ICD-10-CM

## 2018-08-06 PROCEDURE — 70551 MRI BRAIN STEM W/O DYE: CPT | Mod: TC

## 2018-08-06 NOTE — PROGRESS NOTES
SUBJECTIVE:   Pop Eaton III is a 52 year old male who presents to clinic today for the following health issues:      Patient presents with:  Results: Lab results on 07/30/2018             Problem list and histories reviewed & adjusted, as indicated.  Additional history: as documented    Patient Active Problem List   Diagnosis     History of colonic polyps, 2/2017     Family history of prostate cancer     Restless legs syndrome     Past Surgical History:   Procedure Laterality Date     ARTHROSCOPY KNEE RT/LT Left 2013    meniscal tear      ARTHROSCOPY KNEE RT/LT Right 10/2016    medial meniscal tear      COLONOSCOPY  02/2017    Q 3 years for polyps     COLONOSCOPY WITH CO2 INSUFFLATION N/A 2/15/2017    Procedure: COLONOSCOPY WITH CO2 INSUFFLATION;  Surgeon: Galileo Lezama DO;  Location: [facility]     LAPAROSCOPIC APPENDECTOMY  2010     LAPAROSCOPIC CHOLECYSTECTOMY  2010     SURGICAL HISTORY OF -  Left 1995    corneal transplant X 2 on left eye       Social History   Substance Use Topics     Smoking status: Current Every Day Smoker     Packs/day: 0.10     Types: Cigarettes     Smokeless tobacco: Never Used     Alcohol use 0.0 oz/week     0 Standard drinks or equivalent per week      Comment: 1-2 drinks per month      Family History   Problem Relation Age of Onset     Prostate Cancer Maternal Uncle      X2         Current Outpatient Prescriptions   Medication Sig Dispense Refill     acetaminophen (TYLENOL ARTHRITIS PAIN) 650 MG CR tablet Take 1 tablet (650 mg) by mouth every 8 hours as needed for mild pain or fever 60 tablet      fluticasone (FLONASE) 50 MCG/ACT spray Spray 1-2 sprays into both nostrils daily 3 Bottle 11     mometasone (ELOCON) 0.1 % ointment Apply sparingly to affected area twice daily as needed.  Do not apply to face. 45 g 3     psyllium (METAMUCIL) 58.6 % POWD Take one tablespoon daily       No Known Allergies  BP Readings from Last 3 Encounters:   08/07/18 120/80   07/30/18 130/60  "  10/12/17 126/82    Wt Readings from Last 3 Encounters:   08/07/18 174 lb 6.4 oz (79.1 kg)   07/30/18 173 lb (78.5 kg)   10/12/17 180 lb 9.6 oz (81.9 kg)                  Labs reviewed in EPIC    Reviewed and updated as needed this visit by clinical staff       Reviewed and updated as needed this visit by Provider         ROS:  This 52 year old male is here today for 2 reasons:   1. He has terrible restless leg syndrome at night that keeps his wife awake. 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 He had recent labs that were abnormal and I asked him to return to clinic.   2. His RBC and WBC were elevated at his last visit. He is a daily smoker, so that could explain it, but I wanted him to return to make sure he didn't have an underlying leukemia. He stays active. Avoids alcohol. As long as blood is being drawn today, he would like a PSA test due to family history.   He noted that his glucose was high at 119 and he was fasting except for coffee that day. He admits that he loves to eat carbohydrates.        OBJECTIVE:     /80  Pulse 76  Temp 98.7  F (37.1  C) (Oral)  Resp 16  Ht 5' 8.23\" (1.733 m)  Wt 174 lb 6.4 oz (79.1 kg)  SpO2 95%  BMI 26.34 kg/m2  Body mass index is 26.34 kg/(m^2).  GENERAL: healthy, alert and no distress  NECK: no adenopathy, no asymmetry, masses, or scars and thyroid normal to palpation  RESP: lungs clear to auscultation - no rales, rhonchi or wheezes  CV: regular rate and rhythm, normal S1 S2, no S3 or S4, no murmur, click or rub, no peripheral edema and peripheral pulses strong  MS: no gross musculoskeletal defects noted, no edema    Diagnostic Test Results:  Results for orders placed or performed in visit on 08/07/18 (from the past 24 hour(s))   CBC with platelets differential   Result Value Ref Range    WBC 13.7 (H) 4.0 - 11.0 10e9/L    RBC Count 5.93 (H) 4.4 - 5.9 10e12/L    Hemoglobin 16.7 13.3 - 17.7 g/dL    Hematocrit 47.9 40.0 - 53.0 %    MCV 81 78 - 100 " fl    MCH 28.2 26.5 - 33.0 pg    MCHC 34.9 31.5 - 36.5 g/dL    RDW 17.2 (H) 10.0 - 15.0 %    Platelet Count 239 150 - 450 10e9/L    Diff Method Automated Method     % Neutrophils 61.3 %    % Lymphocytes 32.3 %    % Monocytes 4.3 %    % Eosinophils 1.5 %    % Basophils 0.6 %    Absolute Neutrophil 8.4 (H) 1.6 - 8.3 10e9/L    Absolute Lymphocytes 4.4 0.8 - 5.3 10e9/L    Absolute Monocytes 0.6 0.0 - 1.3 10e9/L    Absolute Eosinophils 0.2 0.0 - 0.7 10e9/L    Absolute Basophils 0.1 0.0 - 0.2 10e9/L       ASSESSMENT/PLAN:              1. Leukocytosis, unspecified type  This is come down to almost normal   - CBC with platelets differential    2. Family history of prostate cancer  As above   - PSA, screen    3. Elevated red blood cell count  This has improved as well. Probably slightly elevated due to his smoking.   - CBC with platelets differential    4. Restless legs syndrome  He will try tonic water at bedtime for quinine. If that doesn't help, he will call me and I will start requip. If that fails, he should have arterial ultrasound of his legs given his smoking history.       Return to clinic if no improvement     EB MANZANO MD  [facility]

## 2018-08-07 ENCOUNTER — OFFICE VISIT (OUTPATIENT)
Dept: FAMILY MEDICINE | Facility: CLINIC | Age: 53
End: 2018-08-07
Payer: COMMERCIAL

## 2018-08-07 VITALS
SYSTOLIC BLOOD PRESSURE: 120 MMHG | HEART RATE: 76 BPM | TEMPERATURE: 98.7 F | WEIGHT: 174.4 LBS | DIASTOLIC BLOOD PRESSURE: 80 MMHG | BODY MASS INDEX: 26.43 KG/M2 | OXYGEN SATURATION: 95 % | HEIGHT: 68 IN | RESPIRATION RATE: 16 BRPM

## 2018-08-07 DIAGNOSIS — R71.8 ELEVATED RED BLOOD CELL COUNT: ICD-10-CM

## 2018-08-07 DIAGNOSIS — D72.829 LEUKOCYTOSIS, UNSPECIFIED TYPE: Primary | ICD-10-CM

## 2018-08-07 DIAGNOSIS — Z80.42 FAMILY HISTORY OF PROSTATE CANCER: ICD-10-CM

## 2018-08-07 DIAGNOSIS — G25.81 RESTLESS LEGS SYNDROME: ICD-10-CM

## 2018-08-07 LAB
BASOPHILS # BLD AUTO: 0.1 10E9/L (ref 0–0.2)
BASOPHILS NFR BLD AUTO: 0.6 %
DIFFERENTIAL METHOD BLD: ABNORMAL
EOSINOPHIL # BLD AUTO: 0.2 10E9/L (ref 0–0.7)
EOSINOPHIL NFR BLD AUTO: 1.5 %
ERYTHROCYTE [DISTWIDTH] IN BLOOD BY AUTOMATED COUNT: 17.2 % (ref 10–15)
HCT VFR BLD AUTO: 47.9 % (ref 40–53)
HGB BLD-MCNC: 16.7 G/DL (ref 13.3–17.7)
LYMPHOCYTES # BLD AUTO: 4.4 10E9/L (ref 0.8–5.3)
LYMPHOCYTES NFR BLD AUTO: 32.3 %
MCH RBC QN AUTO: 28.2 PG (ref 26.5–33)
MCHC RBC AUTO-ENTMCNC: 34.9 G/DL (ref 31.5–36.5)
MCV RBC AUTO: 81 FL (ref 78–100)
MONOCYTES # BLD AUTO: 0.6 10E9/L (ref 0–1.3)
MONOCYTES NFR BLD AUTO: 4.3 %
NEUTROPHILS # BLD AUTO: 8.4 10E9/L (ref 1.6–8.3)
NEUTROPHILS NFR BLD AUTO: 61.3 %
PLATELET # BLD AUTO: 239 10E9/L (ref 150–450)
RBC # BLD AUTO: 5.93 10E12/L (ref 4.4–5.9)
WBC # BLD AUTO: 13.7 10E9/L (ref 4–11)

## 2018-08-07 PROCEDURE — 99213 OFFICE O/P EST LOW 20 MIN: CPT | Performed by: FAMILY MEDICINE

## 2018-08-07 PROCEDURE — 85025 COMPLETE CBC W/AUTO DIFF WBC: CPT | Performed by: FAMILY MEDICINE

## 2018-08-07 PROCEDURE — 36415 COLL VENOUS BLD VENIPUNCTURE: CPT | Performed by: FAMILY MEDICINE

## 2018-08-07 PROCEDURE — G0103 PSA SCREENING: HCPCS | Performed by: FAMILY MEDICINE

## 2018-08-07 NOTE — MR AVS SNAPSHOT
"              After Visit Summary   8/7/2018    Pop Eaton III    MRN: [id number]           Patient Information     Date Of Birth          1965        Visit Information        Provider Department      8/7/2018 3:30 PM Vandana Cole MD [facility]        Today's Diagnoses     Leukocytosis, unspecified type    -  1    Family history of prostate cancer        Elevated red blood cell count        Restless legs syndrome           Follow-ups after your visit        Who to contact     If you have questions or need follow up information about today's clinic visit or your schedule please contact [facility] directly at [phone].  Normal or non-critical lab and imaging results will be communicated to you by MyChart, letter or phone within 4 business days after the clinic has received the results. If you do not hear from us within 7 days, please contact the clinic through MyChart or phone. If you have a critical or abnormal lab result, we will notify you by phone as soon as possible.  Submit refill requests through [employer] or call your pharmacy and they will forward the refill request to us. Please allow 3 business days for your refill to be completed.          Additional Information About Your Visit        Care EveryWhere ID     This is your Care EveryWhere ID. This could be used by other organizations to access your [place] medical records  [id number]        Your Vitals Were     Pulse Temperature Respirations Height Pulse Oximetry BMI (Body Mass Index)    76 98.7  F (37.1  C) (Oral) 16 5' 8.23\" (1.733 m) 95% 26.34 kg/m2       Blood Pressure from Last 3 Encounters:   08/07/18 120/80   07/30/18 130/60   10/12/17 126/82    Weight from Last 3 Encounters:   08/07/18 174 lb 6.4 oz (79.1 kg)   07/30/18 173 lb (78.5 kg)   10/12/17 180 lb 9.6 oz (81.9 kg)              We Performed the Following     CBC with platelets differential     PSA, screen        Primary Care Provider Office " Phone # Fax #    Vandana Cole [phone] [address] [facility] [address]        Equal Access to Services     TANYAJAMIA ROLDAN : Hadii annamarie lucas claudioo Soklausali, waaxda luqadaha, qaybta kaalmada adety, tay contrerasdarian herndon. So [facility] [phone].    ATENCIÓN: Si habla español, tiene a nina disposición servicios gratuitos de asistencia lingüística. Llame al [phone].    We comply with applicable federal civil rights laws and [place] laws. We do not discriminate on the basis of race, color, national origin, age, disability, sex, sexual orientation, or gender identity.            Thank you!     Thank you for choosing [facility]  for your care. Our goal is always to provide you with excellent care. Hearing back from our patients is one way we can continue to improve our services. Please take a few minutes to complete the written survey that you may receive in the mail after your visit with us. Thank you!             Your Updated Medication List - Protect others around you: Learn how to safely use, store and throw away your medicines at www.disposemymeds.org.          This list is accurate as of 8/7/18  4:56 PM.  Always use your most recent med list.                   Brand Name Dispense Instructions for use Diagnosis    fluticasone 50 MCG/ACT spray    FLONASE    3 Bottle    Spray 1-2 sprays into both nostrils daily    Seasonal allergic rhinitis, unspecified allergic rhinitis trigger       mometasone 0.1 % ointment    ELOCON    45 g    Apply sparingly to affected area twice daily as needed.  Do not apply to face.    Allergic contact dermatitis, unspecified trigger       psyllium 58.6 % Powd    METAMUCIL     Take one tablespoon daily    Irritable bowel syndrome with diarrhea       TYLENOL ARTHRITIS PAIN 650 MG CR tablet   Generic drug:  acetaminophen     60 tablet    Take 1 tablet (650 mg) by mouth every 8 hours as needed for mild pain or  fever    Primary osteoarthritis of right knee

## 2018-08-07 NOTE — LETTER
[address] [place] [address]    August 9, 2018    Pop Jairon Eaton III  [address] [facility] [address]    Dear Pop,    Your PSA test is normal    Enclosed is a copy of your results.     Results for orders placed or performed in visit on 08/07/18   PSA, screen   Result Value Ref Range    PSA 3.35 0 - 4 ug/L   CBC with platelets differential   Result Value Ref Range    WBC 13.7 (H) 4.0 - 11.0 10e9/L    RBC Count 5.93 (H) 4.4 - 5.9 10e12/L    Hemoglobin 16.7 13.3 - 17.7 g/dL    Hematocrit 47.9 40.0 - 53.0 %    MCV 81 78 - 100 fl    MCH 28.2 26.5 - 33.0 pg    MCHC 34.9 31.5 - 36.5 g/dL    RDW 17.2 (H) 10.0 - 15.0 %    Platelet Count 239 150 - 450 10e9/L    Diff Method Automated Method     % Neutrophils 61.3 %    % Lymphocytes 32.3 %    % Monocytes 4.3 %    % Eosinophils 1.5 %    % Basophils 0.6 %    Absolute Neutrophil 8.4 (H) 1.6 - 8.3 10e9/L    Absolute Lymphocytes 4.4 0.8 - 5.3 10e9/L    Absolute Monocytes 0.6 0.0 - 1.3 10e9/L    Absolute Eosinophils 0.2 0.0 - 0.7 10e9/L    Absolute Basophils 0.1 0.0 - 0.2 10e9/L   If you have any questions or concerns, please call myself or my nurse at [phone].    Sincerely,      Vandana Cole MD/zelda

## 2018-08-08 LAB — PSA SERPL-ACNC: 3.35 UG/L (ref 0–4)

## 2018-10-13 ENCOUNTER — OFFICE VISIT (OUTPATIENT)
Dept: URGENT CARE | Facility: URGENT CARE | Age: 53
End: 2018-10-13
Payer: COMMERCIAL

## 2018-10-13 VITALS
WEIGHT: 177.2 LBS | SYSTOLIC BLOOD PRESSURE: 118 MMHG | TEMPERATURE: 98.3 F | RESPIRATION RATE: 16 BRPM | BODY MASS INDEX: 26.76 KG/M2 | DIASTOLIC BLOOD PRESSURE: 76 MMHG | OXYGEN SATURATION: 98 % | HEART RATE: 70 BPM

## 2018-10-13 DIAGNOSIS — H10.021 PINK EYE DISEASE OF RIGHT EYE: Primary | ICD-10-CM

## 2018-10-13 PROCEDURE — 99213 OFFICE O/P EST LOW 20 MIN: CPT | Performed by: PHYSICIAN ASSISTANT

## 2018-10-13 RX ORDER — POLYMYXIN B SULFATE AND TRIMETHOPRIM 1; 10000 MG/ML; [USP'U]/ML
1 SOLUTION OPHTHALMIC
Qty: 1 BOTTLE | Refills: 0 | Status: SHIPPED | OUTPATIENT
Start: 2018-10-13 | End: 2018-10-20

## 2018-10-13 ASSESSMENT — ENCOUNTER SYMPTOMS
SHORTNESS OF BREATH: 0
RESPIRATORY NEGATIVE: 1
FEVER: 0
DIAPHORESIS: 0
POLYDIPSIA: 0
NAUSEA: 0
HEADACHES: 0
RHINORRHEA: 0
NEUROLOGICAL NEGATIVE: 1
MYALGIAS: 0
ADENOPATHY: 0
SORE THROAT: 0
HEMATURIA: 0
DYSURIA: 0
WEAKNESS: 0
COUGH: 0
CHILLS: 0
VOMITING: 0
DIZZINESS: 0
CHEST TIGHTNESS: 0
CONSTITUTIONAL NEGATIVE: 1
DIARRHEA: 0
ENDOCRINE NEGATIVE: 1
FREQUENCY: 0
LIGHT-HEADEDNESS: 0
EYE ITCHING: 1
GASTROINTESTINAL NEGATIVE: 1
MUSCULOSKELETAL NEGATIVE: 1
WHEEZING: 0
PALPITATIONS: 0
CARDIOVASCULAR NEGATIVE: 1
EYE DISCHARGE: 1
ABDOMINAL PAIN: 0
EYE REDNESS: 1

## 2018-10-13 NOTE — PROGRESS NOTES
Chief Complaint:      Chief Complaint   Patient presents with     Eye Problem     Mattering of eyes and reddness of right eye x2 days       HPI: Pop Eaton III is a 53 year old male presenting with right eye discharge, mattering, redness  for the past 2 days.  There has not been exposure to pink eye.  There has not been trauma to the eye.    Pop Eaton III does not wear contact lenses.    No problems with vision, or eye pain.       No fever, abdominal pain, diarrhea or vomiting.  No cough, chest pain or shortness of breath.    ROS:     Review of Systems   Constitutional: Negative.  Negative for chills, diaphoresis and fever.   HENT: Negative.  Negative for congestion, ear pain, rhinorrhea and sore throat.    Eyes: Positive for discharge, redness and itching.   Respiratory: Negative.  Negative for cough, chest tightness, shortness of breath and wheezing.    Cardiovascular: Negative.  Negative for chest pain and palpitations.   Gastrointestinal: Negative.  Negative for abdominal pain, diarrhea, nausea and vomiting.   Endocrine: Negative.  Negative for polydipsia and polyuria.   Genitourinary: Negative for dysuria, frequency, hematuria and urgency.   Musculoskeletal: Negative.  Negative for myalgias.   Skin: Negative for rash.   Allergic/Immunologic: Negative for immunocompromised state.   Neurological: Negative.  Negative for dizziness, weakness, light-headedness and headaches.   Hematological: Negative for adenopathy.        Vision History         Family History   Family History   Problem Relation Age of Onset     Prostate Cancer Maternal Uncle      X2        Problem history  Patient Active Problem List   Diagnosis     History of colonic polyps, 2/2017     Family history of prostate cancer     Restless legs syndrome        Allergies  No Known Allergies     Social History  Social History     Social History     Marital status:      Spouse name: N/A     Number of children: N/A     Years of  education: N/A     Occupational History     Not on file.     Social History Main Topics     Smoking status: Current Every Day Smoker     Packs/day: 0.10     Types: Cigarettes     Smokeless tobacco: Never Used     Alcohol use 0.0 oz/week     0 Standard drinks or equivalent per week      Comment: 1-2 drinks per month      Drug use: No     Sexual activity: Yes     Partners: Female     Other Topics Concern     Not on file     Social History Narrative        Current Meds    Current Outpatient Prescriptions:      acetaminophen (TYLENOL ARTHRITIS PAIN) 650 MG CR tablet, Take 1 tablet (650 mg) by mouth every 8 hours as needed for mild pain or fever, Disp: 60 tablet, Rfl:      fluticasone (FLONASE) 50 MCG/ACT spray, Spray 1-2 sprays into both nostrils daily, Disp: 3 Bottle, Rfl: 11     mometasone (ELOCON) 0.1 % ointment, Apply sparingly to affected area twice daily as needed.  Do not apply to face., Disp: 45 g, Rfl: 3     psyllium (METAMUCIL) 58.6 % POWD, Take one tablespoon daily, Disp: , Rfl:      trimethoprim-polymyxin b (POLYTRIM) ophthalmic solution, Apply 1 drop to eye every 3 hours for 7 days, Disp: 1 Bottle, Rfl: 0          OBJECTIVE     Vital signs reviewed by Gunner Hand  /76  Pulse 70  Temp 98.3  F (36.8  C) (Oral)  Resp 16  Wt 177 lb 3.2 oz (80.4 kg)  SpO2 98%  BMI 26.76 kg/m2     Physical Exam   Constitutional: He appears well-developed and well-nourished. No distress.   HENT:   Head: Normocephalic and atraumatic.   Right Ear: Tympanic membrane and external ear normal.   Left Ear: Tympanic membrane and external ear normal.   Mouth/Throat: Oropharynx is clear and moist.   Eyes: EOM and lids are normal. Pupils are equal, round, and reactive to light. Right eye exhibits no discharge and no exudate. Left eye exhibits no discharge and no exudate. Right conjunctiva is injected. Right conjunctiva has no hemorrhage. Left conjunctiva is not injected. Left conjunctiva has no hemorrhage.   Neck: Normal  range of motion. Neck supple.   Cardiovascular: Normal rate, regular rhythm, normal heart sounds and intact distal pulses.  Exam reveals no gallop and no friction rub.    No murmur heard.  Pulmonary/Chest: Effort normal and breath sounds normal. No respiratory distress.   Abdominal: Soft. Bowel sounds are normal. He exhibits no distension. There is no tenderness.   Lymphadenopathy:     He has no cervical adenopathy.   Neurological: He is alert. No cranial nerve deficit.   Skin: Skin is warm and dry. No rash noted. He is not diaphoretic.   Psychiatric: He has a normal mood and affect.   Nursing note and vitals reviewed.         Medical Decision Making:    Differential Diagnosis:  Eye Problem: Bacterial conjunctivitis  Viral conjunctivitis  Allergic conjunctivitis     ASSESSMENT     1. Pink eye disease of right eye         PLAN  Rx for Polytrim drops  Artifical tears for irritation  Warm compresses with baby shampoo for mattering.   If symptoms are not improving follow up with your eye doctor in 2-3 days.  See your eye doctor immediately if symptoms worsen.  If contact wearer, do not wear contacts for next 7 days.  Throw old contacts away.  Worrisome symptoms discussed with instructions to go to the ED.  Patient verbalized understanding and agreed with this plan.     Gunner Hand  10/13/2018, 10:05 AM

## 2018-10-13 NOTE — MR AVS SNAPSHOT
After Visit Summary   10/13/2018    Pop Eaton III    MRN: [id number]           Patient Information     Date Of Birth          1965        Visit Information        Provider Department      10/13/2018 9:40 AM Gunner Hand PA-C [facility]        Today's Diagnoses     Pink eye disease of right eye    -  1       Follow-ups after your visit        Who to contact     If you have questions or need follow up information about today's clinic visit or your schedule please contact [facility] directly at [phone].  Normal or non-critical lab and imaging results will be communicated to you by MyChart, letter or phone within 4 business days after the clinic has received the results. If you do not hear from us within 7 days, please contact the clinic through MyChart or phone. If you have a critical or abnormal lab result, we will notify you by phone as soon as possible.  Submit refill requests through [employer] or call your pharmacy and they will forward the refill request to us. Please allow 3 business days for your refill to be completed.          Additional Information About Your Visit        Care EveryWhere ID     This is your Care EveryWhere ID. This could be used by other organizations to access your [place] medical records  [id number]        Your Vitals Were     Pulse Temperature Respirations Pulse Oximetry BMI (Body Mass Index)       70 98.3  F (36.8  C) (Oral) 16 98% 26.76 kg/m2        Blood Pressure from Last 3 Encounters:   10/13/18 118/76   08/07/18 120/80   07/30/18 130/60    Weight from Last 3 Encounters:   10/13/18 177 lb 3.2 oz (80.4 kg)   08/07/18 174 lb 6.4 oz (79.1 kg)   07/30/18 173 lb (78.5 kg)              Today, you had the following     No orders found for display         Today's Medication Changes          These changes are accurate as of 10/13/18 10:13 AM.  If you have any questions, ask your nurse or doctor.                Start taking these medicines.        Dose/Directions    trimethoprim-polymyxin b ophthalmic solution   Commonly known as:  POLYTRIM   Used for:  Pink eye disease of right eye   Started by:  Gunner Hand PA-C        Dose:  1 drop   Apply 1 drop to eye every 3 hours for 7 days   Quantity:  1 Bottle   Refills:  0            Where to get your medicines      These medications were sent to [id number] [address]  AT [facility] & [address] , [facility] [address]     Phone:  [phone]     trimethoprim-polymyxin b ophthalmic solution                Primary Care Provider Office Phone # Fax #    Vandana Cole [phone] [address] [facility] [address]        Equal Access to Services     DARLENE MONTILLA : Hadii annamarie lucas hadasho Soomaali, waaxda luqadaha, qaybta kaalmada adeegyada, tay herndon. So [facility] [phone].    ATENCIÓN: Si habla español, tiene a nina disposición servicios gratuitos de asistencia lingüística. [facility] [phone].    We comply with applicable federal civil rights laws and [place] laws. We do not discriminate on the basis of race, color, national origin, age, disability, sex, sexual orientation, or gender identity.            Thank you!     Thank you for choosing [facility]  for your care. Our goal is always to provide you with excellent care. Hearing back from our patients is one way we can continue to improve our services. Please take a few minutes to complete the written survey that you may receive in the mail after your visit with us. Thank you!             Your Updated Medication List - Protect others around you: Learn how to safely use, store and throw away your medicines at www.disposemymeds.org.          This list is accurate as of 10/13/18 10:13 AM.  Always use your most recent med list.                   Brand Name Dispense  Instructions for use Diagnosis    fluticasone 50 MCG/ACT spray    FLONASE    3 Bottle    Spray 1-2 sprays into both nostrils daily    Seasonal allergic rhinitis, unspecified allergic rhinitis trigger       mometasone 0.1 % ointment    ELOCON    45 g    Apply sparingly to affected area twice daily as needed.  Do not apply to face.    Allergic contact dermatitis, unspecified trigger       psyllium 58.6 % Powd    METAMUCIL     Take one tablespoon daily    Irritable bowel syndrome with diarrhea       trimethoprim-polymyxin b ophthalmic solution    POLYTRIM    1 Bottle    Apply 1 drop to eye every 3 hours for 7 days    Pink eye disease of right eye       TYLENOL ARTHRITIS PAIN 650 MG CR tablet   Generic drug:  acetaminophen     60 tablet    Take 1 tablet (650 mg) by mouth every 8 hours as needed for mild pain or fever    Primary osteoarthritis of right knee

## 2018-10-30 ENCOUNTER — TELEPHONE (OUTPATIENT)
Dept: FAMILY MEDICINE | Facility: CLINIC | Age: 53
End: 2018-10-30

## 2018-10-30 NOTE — TELEPHONE ENCOUNTER
Call him. He needs to call his insurance company to see if he needs a referral from me. I would need the full name of the orthopedist he wants to see as well as the phone number and the name of the clinic where the orthopedist works.   EB MANZANO M.D.

## 2018-10-30 NOTE — TELEPHONE ENCOUNTER
Pt having shoulder pain and requesting referral to orthopedics Dr. Russell.     Please advise.     Etta Conley RN

## 2018-10-30 NOTE — TELEPHONE ENCOUNTER
Reason for Call:  Other call back    Detailed comments: Patient is experiencing shoulder pain and is wondering if he needs a referral to see Dr. Russell. Please advise.     Phone Number Patient can be reached at: Cell number on file:    Telephone Information:   Mobile [phone]       Best Time: any     Can we leave a detailed message on this number? YES    Call taken on 10/30/2018 at 9:51 AM by Klever Givens

## 2018-10-30 NOTE — TELEPHONE ENCOUNTER
Called patient let him know what provider said he will get back to us after he talks to his insurance company.  Britt Nolasco,

## 2019-02-20 ENCOUNTER — OFFICE VISIT (OUTPATIENT)
Dept: FAMILY MEDICINE | Facility: CLINIC | Age: 54
End: 2019-02-20
Payer: COMMERCIAL

## 2019-02-20 VITALS
BODY MASS INDEX: 27.25 KG/M2 | TEMPERATURE: 98.2 F | HEIGHT: 68 IN | RESPIRATION RATE: 14 BRPM | WEIGHT: 179.8 LBS | SYSTOLIC BLOOD PRESSURE: 128 MMHG | OXYGEN SATURATION: 96 % | HEART RATE: 90 BPM | DIASTOLIC BLOOD PRESSURE: 80 MMHG

## 2019-02-20 DIAGNOSIS — Z01.818 PREOP GENERAL PHYSICAL EXAM: Primary | ICD-10-CM

## 2019-02-20 DIAGNOSIS — M54.12 CERVICAL RADICULOPATHY: ICD-10-CM

## 2019-02-20 LAB
ANION GAP SERPL CALCULATED.3IONS-SCNC: 4 MMOL/L (ref 3–14)
BUN SERPL-MCNC: 17 MG/DL (ref 7–30)
CALCIUM SERPL-MCNC: 9 MG/DL (ref 8.5–10.1)
CHLORIDE SERPL-SCNC: 107 MMOL/L (ref 94–109)
CO2 SERPL-SCNC: 29 MMOL/L (ref 20–32)
CREAT SERPL-MCNC: 1.07 MG/DL (ref 0.66–1.25)
GFR SERPL CREATININE-BSD FRML MDRD: 79 ML/MIN/{1.73_M2}
GLUCOSE SERPL-MCNC: 104 MG/DL (ref 70–99)
INR PPP: 0.84 (ref 0.86–1.14)
POTASSIUM SERPL-SCNC: 4.4 MMOL/L (ref 3.4–5.3)
SODIUM SERPL-SCNC: 140 MMOL/L (ref 133–144)

## 2019-02-20 PROCEDURE — 80048 BASIC METABOLIC PNL TOTAL CA: CPT | Performed by: FAMILY MEDICINE

## 2019-02-20 PROCEDURE — 85610 PROTHROMBIN TIME: CPT | Performed by: FAMILY MEDICINE

## 2019-02-20 PROCEDURE — 93000 ELECTROCARDIOGRAM COMPLETE: CPT | Performed by: FAMILY MEDICINE

## 2019-02-20 PROCEDURE — 99214 OFFICE O/P EST MOD 30 MIN: CPT | Performed by: FAMILY MEDICINE

## 2019-02-20 PROCEDURE — 36415 COLL VENOUS BLD VENIPUNCTURE: CPT | Performed by: FAMILY MEDICINE

## 2019-02-20 RX ORDER — TRAMADOL HYDROCHLORIDE 50 MG/1
50 TABLET ORAL 2 TIMES DAILY
COMMUNITY
End: 2020-04-29

## 2019-02-20 ASSESSMENT — MIFFLIN-ST. JEOR: SCORE: 1645.56

## 2019-02-20 NOTE — LETTER
[address] [place] [address]    February 25, 2019    Pop Eaton III  [address] [facility] [address]          Dear Pop,    Jaycee is a copy of your results. Normal results.    Results for orders placed or performed in visit on 02/20/19   **Basic metabolic panel FUTURE anytime   Result Value Ref Range    Sodium 140 133 - 144 mmol/L    Potassium 4.4 3.4 - 5.3 mmol/L    Chloride 107 94 - 109 mmol/L    Carbon Dioxide 29 20 - 32 mmol/L    Anion Gap 4 3 - 14 mmol/L    Glucose 104 (H) 70 - 99 mg/dL    Urea Nitrogen 17 7 - 30 mg/dL    Creatinine 1.07 0.66 - 1.25 mg/dL    GFR Estimate 79 >60 mL/min/[1.73_m2]    GFR Estimate If Black >90 >60 mL/min/[1.73_m2]    Calcium 9.0 8.5 - 10.1 mg/dL   INR   Result Value Ref Range    INR 0.84 (L) 0.86 - 1.14   If you have any questions or concerns, please me or my clinic team at [phone].      Sincerely,        Ayush Santos MD/bt

## 2019-02-20 NOTE — PROGRESS NOTES
[facility]  [address] [place] [id number]  [phone]  Dept: [phone]    PRE-OP EVALUATION:  Today's date: 2019    Pop Eaton III (: 1965) presents for pre-operative evaluation assessment as requested by Dr. Balderas.  He requires evaluation and anesthesia risk assessment prior to undergoing surgery/procedure for treatment of  Herniated Disc.    Fax number for surgical facility:   Primary Physician: Ayush Santos  Type of Anesthesia Anticipated: to be determined  Facility: [facility]: [facility] Ortho: Mike    Patient has a Health Care Directive or Living Will:  NO    Preop Questions 2019   Who is doing your surgery? Gordo   What are you having done? spine pinched nerve   Date of Surgery/Procedure: 19   Facility or Hospital where procedure/surgery [facility] performed[facility]   1.  Do you have a history of Heart attack, stroke, stent, coronary bypass surgery, or other heart surgery? No   2.  Do you ever have any pain or discomfort in your chest? No   3.  Do you have a history of  Heart Failure? No   4.   Are you troubled by shortness of breath when:  walking on a level surface, or up a slight hill, or at night? No   5.  Do you currently have a cold, bronchitis or other respiratory infection? No   6.  Do you have a cough, shortness of breath, or wheezing? No   7.  Do you sometimes get pains in the calves of your legs when you walk? No   8. Do you or anyone in your family have previous history of blood clots? No   9.  Do you or does anyone in your family have a serious bleeding problem such as prolonged bleeding following surgeries or cuts? No   10. Have you ever had problems with anemia or been told to take iron pills? No   11. Have you had any abnormal blood loss such as black, tarry or bloody stools? No   12. Have you ever had a blood transfusion? No   13. Have you or any of your relatives ever had problems with anesthesia? No    14. Do you have sleep apnea, excessive snoring or daytime drowsiness? No   15. Do you have any prosthetic heart valves? No   16. Do you have prosthetic joints? No         HPI:     HPI related to upcoming procedure: Cervical Spine surgery for radiculopathy    See problem list for active medical problems. See ROS for pertinent symptoms related to these conditions.                                                                                                                                                          .    MEDICAL HISTORY:     Patient Active Problem List    Diagnosis Date Noted     Family history of prostate cancer 08/07/2018     Priority: Medium     Restless legs syndrome 08/07/2018     Priority: Medium     History of colonic polyps, 2/2017 02/15/2017     Priority: Medium      History reviewed. No pertinent past medical history.  Past Surgical History:   Procedure Laterality Date     ARTHROSCOPY KNEE RT/LT Left 2013    meniscal tear      ARTHROSCOPY KNEE RT/LT Right 10/2016    medial meniscal tear      COLONOSCOPY  02/2017    Q 3 years for polyps     COLONOSCOPY WITH CO2 INSUFFLATION N/A 2/15/2017    Procedure: COLONOSCOPY WITH CO2 INSUFFLATION;  Surgeon: Galileo Lezama DO;  Location: [facility] OR     LAPAROSCOPIC APPENDECTOMY  2010     LAPAROSCOPIC CHOLECYSTECTOMY  2010     SURGICAL HISTORY OF -  Left 1995    corneal transplant X 2 on left eye     Current Outpatient Medications   Medication Sig Dispense Refill     acetaminophen (TYLENOL ARTHRITIS PAIN) 650 MG CR tablet Take 1 tablet (650 mg) by mouth every 8 hours as needed for mild pain or fever 60 tablet      fluticasone (FLONASE) 50 MCG/ACT spray Spray 1-2 sprays into both nostrils daily 3 Bottle 11     mometasone (ELOCON) 0.1 % ointment Apply sparingly to affected area twice daily as needed.  Do not apply to face. 45 g 3     psyllium (METAMUCIL) 58.6 % POWD Take one tablespoon daily       traMADol (ULTRAM) 50 MG tablet Take 100 mg by mouth 2 times  "daily       OTC products: None, except as noted above    No Known Allergies   Latex Allergy: NO    Social History     Tobacco Use     Smoking status: Light Tobacco Smoker     Packs/day: 0.04     Types: Cigarettes     Smokeless tobacco: Never Used   Substance Use Topics     Alcohol use: Yes     Alcohol/week: 0.0 oz     Comment: 1-2 drinks per month      History   Drug Use No       REVIEW OF SYSTEMS:   Constitutional, HEENT, cardiovascular, pulmonary, gi and gu systems are negative, except as otherwise noted.    EXAM:   /80   Pulse 90   Temp 98.2  F (36.8  C) (Oral)   Resp 14   Ht 1.736 m (5' 8.35\")   Wt 81.6 kg (179 lb 12.8 oz)   SpO2 96%   BMI 27.06 kg/m      GENERAL APPEARANCE: healthy, alert and no distress     EYES: EOMI,  PERRL     HENT: ear canals and TM's normal and nose and mouth without ulcers or lesions     NECK: no adenopathy and thyroid normal to palpation     RESP: lungs clear to auscultation - no rales, rhonchi or wheezes     CV: regular rates and rhythm, normal S1 S2, no S3 or S4 and no murmur, click or rub     ABDOMEN:  soft, nontender, no HSM or masses and bowel sounds normal     SKIN: no suspicious lesions or rashes     NEURO: Normal strength and tone, mentation intact and speech normal     PSYCH: mentation appears normal. and affect normal/bright     LYMPHATICS: No cervical adenopathy    DIAGNOSTICS:   EKG: appears normal, NSR, normal axis, normal intervals, no acute ST/T changes c/w ischemia, no LVH by voltage criteria    Results for orders placed or performed in visit on 02/20/19   **Basic metabolic panel FUTURE anytime   Result Value Ref Range    Sodium 140 133 - 144 mmol/L    Potassium 4.4 3.4 - 5.3 mmol/L    Chloride 107 94 - 109 mmol/L    Carbon Dioxide 29 20 - 32 mmol/L    Anion Gap 4 3 - 14 mmol/L    Glucose 104 (H) 70 - 99 mg/dL    Urea Nitrogen 17 7 - 30 mg/dL    Creatinine 1.07 0.66 - 1.25 mg/dL    GFR Estimate 79 >60 mL/min/[1.73_m2]    GFR Estimate If Black >90 >60 " mL/min/[1.73_m2]    Calcium 9.0 8.5 - 10.1 mg/dL   INR   Result Value Ref Range    INR 0.84 (L) 0.86 - 1.14     Recent Labs   Lab Test 08/07/18  1610 07/30/18  1037 04/17/17  1137   HGB 16.7 17.1  --     184  --    NA  --  142 142   POTASSIUM  --  4.1 4.1   CR  --  1.10 1.09     IMPRESSION:   Reason for surgery/procedure: Cervical Radiculopathy/  Diagnosis/reason for consult:  Cervical Radiculopathy/Pre OP    The proposed surgical procedure is considered INTERMEDIATE risk.    REVISED CARDIAC RISK INDEX  The patient has the following serious cardiovascular risks for perioperative complications such as (MI, PE, VFib and 3  AV Block):  No serious cardiac risks  INTERPRETATION: 0 risks: Class I (very low risk - 0.4% complication rate)    The patient has the following additional risks for perioperative complications:  No identified additional risks      ICD-10-CM    1. Preop general physical exam Z01.818 EKG 12-lead complete w/read - Clinics   2. Cervical radiculopathy M54.12        RECOMMENDATIONS:     --Patient is to take all scheduled medications on the day of surgery EXCEPT for modifications listed below.  --Patient is on no chronic medications    APPROVAL GIVEN to proceed with proposed procedure, without further diagnostic evaluation       Signed Electronically by: Ayush Santos MD    Copy of this evaluation report is provided to requesting physician.    [facility] Preop Guidelines    Revised Cardiac Risk Index

## 2019-05-07 ENCOUNTER — OFFICE VISIT (OUTPATIENT)
Dept: FAMILY MEDICINE | Facility: CLINIC | Age: 54
End: 2019-05-07
Payer: COMMERCIAL

## 2019-05-07 VITALS
WEIGHT: 182 LBS | DIASTOLIC BLOOD PRESSURE: 85 MMHG | TEMPERATURE: 98.1 F | SYSTOLIC BLOOD PRESSURE: 124 MMHG | HEIGHT: 68 IN | OXYGEN SATURATION: 97 % | RESPIRATION RATE: 12 BRPM | HEART RATE: 87 BPM | BODY MASS INDEX: 27.58 KG/M2

## 2019-05-07 DIAGNOSIS — M25.511 CHRONIC RIGHT SHOULDER PAIN: ICD-10-CM

## 2019-05-07 DIAGNOSIS — G89.29 CHRONIC RIGHT SHOULDER PAIN: ICD-10-CM

## 2019-05-07 DIAGNOSIS — F40.243 FEAR OF FLYING: Primary | ICD-10-CM

## 2019-05-07 DIAGNOSIS — R73.9 HYPERGLYCEMIA: ICD-10-CM

## 2019-05-07 LAB
CRP SERPL-MCNC: 3.1 MG/L (ref 0–8)
HBA1C MFR BLD: 5.5 % (ref 0–5.6)

## 2019-05-07 PROCEDURE — 83036 HEMOGLOBIN GLYCOSYLATED A1C: CPT | Performed by: FAMILY MEDICINE

## 2019-05-07 PROCEDURE — 86140 C-REACTIVE PROTEIN: CPT | Performed by: FAMILY MEDICINE

## 2019-05-07 PROCEDURE — 36415 COLL VENOUS BLD VENIPUNCTURE: CPT | Performed by: FAMILY MEDICINE

## 2019-05-07 PROCEDURE — 99214 OFFICE O/P EST MOD 30 MIN: CPT | Performed by: FAMILY MEDICINE

## 2019-05-07 PROCEDURE — 86431 RHEUMATOID FACTOR QUANT: CPT | Performed by: FAMILY MEDICINE

## 2019-05-07 PROCEDURE — 86200 CCP ANTIBODY: CPT | Performed by: FAMILY MEDICINE

## 2019-05-07 RX ORDER — ALPRAZOLAM 0.5 MG
0.5 TABLET ORAL DAILY PRN
Qty: 10 TABLET | Refills: 0 | Status: SHIPPED | OUTPATIENT
Start: 2019-05-07 | End: 2020-04-29

## 2019-05-07 ASSESSMENT — MIFFLIN-ST. JEOR: SCORE: 1650.05

## 2019-05-07 NOTE — PROGRESS NOTES
SUBJECTIVE:   Pop Eaton III is a 53 year old male who presents to clinic today for the following   health issues:    Chief Complaint   Patient presents with     Desires bloodwork for RA and diabetes     1. Rt Shoulder pain x  5 months    Had MRI was structurally normal    Has pain/discomfort with raising arm    Mother has bar RA    Had neck surgery but has not fixed the shoulder pain.    2. Anxiety with flying      Has multiple flights coming up    3. Concerns about diabetes:       Belly distended than usual and bloats easily          Wt Readings from Last 4 Encounters:   05/07/19 82.6 kg (182 lb)   02/20/19 81.6 kg (179 lb 12.8 oz)   10/13/18 80.4 kg (177 lb 3.2 oz)   08/07/18 79.1 kg (174 lb 6.4 oz)       Additional history: as documented    Tobacco  Allergies  Meds  Med Hx  Surg Hx  Fam Hx  Soc Hx      Reviewed and updated as needed this visit by Provider    Patient Active Problem List   Diagnosis     History of colonic polyps, 2/2017     Family history of prostate cancer     Restless legs syndrome     Past Surgical History:   Procedure Laterality Date     ARTHROSCOPY KNEE RT/LT Left 2013    meniscal tear      ARTHROSCOPY KNEE RT/LT Right 10/2016    medial meniscal tear      COLONOSCOPY  02/2017    Q 3 years for polyps     COLONOSCOPY WITH CO2 INSUFFLATION N/A 2/15/2017    Procedure: COLONOSCOPY WITH CO2 INSUFFLATION;  Surgeon: Galileo Lezama DO;  Location: [facility] OR     LAPAROSCOPIC APPENDECTOMY  2010     LAPAROSCOPIC CHOLECYSTECTOMY  2010     SURGICAL HISTORY OF -  Left 1995    corneal transplant X 2 on left eye       Social History     Tobacco Use     Smoking status: Light Tobacco Smoker     Packs/day: 0.04     Types: Cigarettes     Smokeless tobacco: Never Used   Substance Use Topics     Alcohol use: Yes     Alcohol/week: 0.0 oz     Comment: 1-2 drinks per month      Family History   Problem Relation Age of Onset     Prostate Cancer Maternal Uncle         X2         ROS:  Constitutional,  "HEENT, cardiovascular, pulmonary, gi and gu systems are negative, except as otherwise noted.    OBJECTIVE:     /85   Pulse 87   Temp 98.1  F (36.7  C) (Oral)   Resp 12   Ht 1.727 m (5' 8\")   Wt 82.6 kg (182 lb)   SpO2 97%   BMI 27.67 kg/m    Body mass index is 27.67 kg/m .  GENERAL: healthy, alert and no distress  NECK: no adenopathy and thyroid normal to palpation  RESP: lungs clear to auscultation - no rales, rhonchi or wheezes  CV: regular rate and rhythm, no murmur, click or rub, no peripheral edema.  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  Since this is specific to an antianxiety medication as needed; for travel  Plan: ALPRAZolam (XANAX) 0.5 MG tablet    (M25.511,  G89.29) Chronic right shoulder pain  Comment: Discussed fact that a positive rheumatoid factor (RF) test can be found in rheumatic disorders, in nonrheumatic disorders, and in healthy subjects   Rheumatic disorder: RA, Sjorgens, connective tissue disorders, SLE etc  Plan: Rheumatoid factor, CRP, inflammation, Cyclic         Citrullinated Peptide Antibody IgG    (R73.9) Hyperglycemia  Comment: As gained some weight recently concerned about diabetes  Plan: Hemoglobin A1c    Follow up in 1 month or sooner with concerns    Ayush Santos MD  [facility]      "

## 2019-05-08 LAB
CCP AB SER IA-ACNC: 1 U/ML
RHEUMATOID FACT SER NEPH-ACNC: <20 IU/ML (ref 0–20)

## 2019-05-14 ENCOUNTER — TELEPHONE (OUTPATIENT)
Dept: FAMILY MEDICINE | Facility: CLINIC | Age: 54
End: 2019-05-14

## 2019-05-14 DIAGNOSIS — G89.29 CHRONIC RIGHT SHOULDER PAIN: Primary | ICD-10-CM

## 2019-05-14 DIAGNOSIS — M25.511 CHRONIC RIGHT SHOULDER PAIN: Primary | ICD-10-CM

## 2019-05-14 NOTE — TELEPHONE ENCOUNTER
Patient was seen on 5/7/19 for right shoulder pain was was checked for rheumatoid disorder but all tests were normal.  Patient is still in pain and wondering what to do?  Haley Shaver RN

## 2019-05-14 NOTE — TELEPHONE ENCOUNTER
Reason for call:  Other   Patient called regarding (reason for call): call back  Additional comments: Patient is calling because he said he had tests done and everything came back good but he wants to know where to go from here because he is still in pain. Please call back    Phone number to reach patient:  Home number on file [phone] (home)    Best Time:  any    Can we leave a detailed message on this number?  YES

## 2019-05-15 NOTE — TELEPHONE ENCOUNTER
Spoke with pt. Gave him provider's message as written. States he did PT for his neck, not his shoulder. Can barely lift 1 gallon of milk and is having difficulty putting a jacket in the closet and reaching up. His shoulder and collar bone hurts worse than December. Is still taking Tramadol periodically. Has to return to work on 5/28. 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 He is concerned about returning to work. He is willing to try PT for his shoulder. Number provided. He is also trying his hot tub, acupuncture and massage. Closing encounter. No further action needed.    Leanne Akers RN  [facility]

## 2019-05-15 NOTE — TELEPHONE ENCOUNTER
Will recommend doing physical therapy.  He was seen by ortho in 11/2018 and given shot and PT was recommended by I do not see follow up (if done elsewhere then orthopedic follow up will be ideal).     Physical Therapy, Hand Therapy and Chiropractic Care are available through:  *[facility]  *Hand Therapy (Occupational Therapy or Physical Therapy)  *[facility]    Call one number to schedule at any of the above locations: [phone].    Physical therapy, Hand therapy and/or Chiropractic care has been recommended by your physician as an excellent treatment option to reduce pain and help people return to normal activities, including sports.  Therapy and/or chiropractic care services are a great complement or alternative to expensive and invasive surgery, injections, or long-term use of prescription medications. The primary goal is to identify the underlying problem and provide you the tools to manage your condition on your own.     Please be aware that coverage of these services is subject to the terms and limitations of your health insurance plan.  Call member services at your health plan with any benefit or coverage questions.      Please bring the following to your appointment:  *Your personal calendar for scheduling future appointments  *Comfortable clothing          Order Questions     Question Answer Comment   Your provider has referred you to Physical Therapy at [facility] or [facility]    Reason for Referral Shoulder Pain    Course of Action Evaluation and Treatment    Special Programs General

## 2019-05-21 ENCOUNTER — THERAPY VISIT (OUTPATIENT)
Dept: PHYSICAL THERAPY | Facility: CLINIC | Age: 54
End: 2019-05-21
Payer: COMMERCIAL

## 2019-05-21 DIAGNOSIS — G89.29 CHRONIC RIGHT SHOULDER PAIN: ICD-10-CM

## 2019-05-21 DIAGNOSIS — M25.511 CHRONIC RIGHT SHOULDER PAIN: ICD-10-CM

## 2019-05-21 DIAGNOSIS — M25.512 BILATERAL SHOULDER PAIN: ICD-10-CM

## 2019-05-21 DIAGNOSIS — M25.511 BILATERAL SHOULDER PAIN: ICD-10-CM

## 2019-05-21 PROCEDURE — 97161 PT EVAL LOW COMPLEX 20 MIN: CPT | Mod: GP | Performed by: PHYSICAL THERAPIST

## 2019-05-21 PROCEDURE — 97110 THERAPEUTIC EXERCISES: CPT | Mod: GP | Performed by: PHYSICAL THERAPIST

## 2019-05-21 NOTE — PROGRESS NOTES
[facility] Initial Evaluation  Subjective:  The history is provided by the patient. No  was used.   Pop Eaton III is a 53 year old male with a right shoulder condition.  Occurance: noticed the pain started while lifting things at work, not a work comp injury.  Condition occurred: at work.  This is a recurrent condition  Post op Cervical Fusion 2/26/2019. Pain started in December of 2018. Pt states he doesn't think they fixed the problem when he had the surgery. When he goes to lift something even 1#, this increases the pain and weakness into the arms. Pt is concerned because he is supposed to return to work next week..    Patient reports pain:  Scapular area and upper arm.    Pain is described as stabbing (shoulders are stabbing, anterior shoulder increased pain of aching, cramping in the anterior part of the shoulder)  and reported as 7/10.  Associated symptoms:  Loss of motion/stiffness and loss of strength (in both shoulders).   Symptoms are exacerbated by certain positions, carrying, using arm overhead and lifting and relieved by rest.  Since onset symptoms are unchanged (unchanged shoulder pain even with neck surgery. was under the impression that the neck surgery was going to fix it.).          Pertinent medical history includes:  None.  Medical allergies: no.  Other surgeries include:  Orthopedic surgery (C3-C7 Fusion 2/26/2019).  Current medications:  None as reported by the patient.          Barriers include:  None as reported by the patient.    Red flags:  None as reported by the patient.          Goal: be able to return to work of lifting heavy objects for [employer] and reaching overhead without increased pain.                Objective:  System                   Shoulder Evaluation:  ROM:  AROM:    Flexion:  Left:  108    Right:  110    Abduction:  Left: 95   Right:  95    Internal Rotation:  Left:  L1    Right:  L1  External Rotation:  Left:  50    Right:   60                  Pain: increased pain with ER, abduction and flexion.    Strength:    Flexion: Left:3+/5 Strong/painful    Pain:    Right: 3+/5  Strong/painful     Pain:     Abduction:  Left: 3+/5  Weak/pain free  Pain:    Right: 3+/5   Weak/pain free    Pain:    Internal Rotation:  Left:4-/5   Strong/pain free    Pain:    Right: 4-/5   Strong/pain free    Pain:  External Rotation:   Left:3+/5   Strong/pain free    Pain:   Right:3/5   Strong/painful    Pain:              Special Tests:  Special tests assessed shoulder: + Speed's Test.                                       Myotomes:  Myotomes normal and equal B.    Dermatomes:  + C4 and C7 dermatomal pattern. Per pt report, this has been since the neck was injury.    Reflexes: normal bicep and tricep reflexes     General     ROS    Assessment/Plan:    Patient is a 53 year old male with right side shoulder complaints.    Patient has the following significant findings with corresponding treatment plan.                Diagnosis 1:  Right Shoulder Pain  Pain -  hot/cold therapy, manual therapy, self management, education, directional preference exercise and home program  Decreased ROM/flexibility - manual therapy and therapeutic exercise  Decreased joint mobility - manual therapy and therapeutic exercise  Decreased strength - therapeutic exercise and therapeutic activities  Impaired posture - neuro re-education      Previous and current functional limitations:  (See Goal Flow Sheet for this information)    Short term and Long term goals: (See Goal Flow Sheet for this information)     Communication ability:  Patient appears to be able to clearly communicate and understand verbal and written communication and follow directions correctly.  Treatment Explanation - The following has been discussed with the patient:   RX ordered/plan of care  Anticipated outcomes  Possible risks and side effects  This patient would benefit from PT intervention to resume normal activities.    Rehab potential is good.    Frequency:  1 X week, once daily  Duration:  for 8 weeks  Discharge Plan:  Achieve all LTG.  Independent in home treatment program.  Reach maximal therapeutic benefit.    Please refer to the daily flowsheet for treatment today, total treatment time and time spent performing 1:1 timed codes.

## 2019-05-28 ENCOUNTER — THERAPY VISIT (OUTPATIENT)
Dept: PHYSICAL THERAPY | Facility: CLINIC | Age: 54
End: 2019-05-28
Payer: COMMERCIAL

## 2019-05-28 DIAGNOSIS — M25.512 BILATERAL SHOULDER PAIN, UNSPECIFIED CHRONICITY: ICD-10-CM

## 2019-05-28 DIAGNOSIS — M25.511 BILATERAL SHOULDER PAIN, UNSPECIFIED CHRONICITY: ICD-10-CM

## 2019-05-28 PROCEDURE — 97110 THERAPEUTIC EXERCISES: CPT | Mod: GP | Performed by: PHYSICAL THERAPIST

## 2019-05-28 PROCEDURE — 97530 THERAPEUTIC ACTIVITIES: CPT | Mod: GP | Performed by: PHYSICAL THERAPIST

## 2019-05-28 RX ORDER — TRAMADOL HYDROCHLORIDE 50 MG/1
50 TABLET ORAL 2 TIMES DAILY
OUTPATIENT
Start: 2019-05-28

## 2019-05-28 NOTE — TELEPHONE ENCOUNTER
I have not prescribed this medication (controlledd substance); he should contact his regular prescriber .

## 2019-05-28 NOTE — TELEPHONE ENCOUNTER
RX [place] ([place]) reviewed: printed for PCP to review, this has been prescribed by another provider.     [place] profile:  [email or web address]    Haley Shaver RN

## 2019-05-28 NOTE — TELEPHONE ENCOUNTER
Reason for Call:  Medication or medication refill:    Do you use a [facility]?  Name of the pharmacy and phone number for the current request:    [id number] - [place], [place] - [address] AT [address] [place] [address]  Phone: [phone] Fax: [phone]      Name of the medication requested: traMADol (ULTRAM) 50 MG tablet       Other request: patient is out of medication     Can we leave a detailed message on this number? YES    Phone number patient can be reached at: Cell number on file:    Telephone Information:   Mobile [phone]       Best Time: any     Call taken on 5/28/2019 at 2:26 PM by Klever Givens

## 2019-05-28 NOTE — TELEPHONE ENCOUNTER
Spoke with patient, patient aware to contact his previous provider for refills on his Tramadol.  Katelin MYLES MA

## 2019-05-28 NOTE — TELEPHONE ENCOUNTER
Controlled Substance Refill Request for traMADol (ULTRAM) 50 MG tablet  Problem List Complete:  No     PROVIDER TO CONSIDER COMPLETION OF PROBLEM LIST AND OVERVIEW/CONTROLLED SUBSTANCE AGREEMENT    Last Written Prescription Date:    Last Fill Quantity: ,   # refills:     THE MOST RECENT OFFICE VISIT MUST BE WITHIN THE PAST 3 MONTHS. AT LEAST ONE FACE TO FACE VISIT MUST OCCUR EVERY 6 MONTHS. ADDITIONAL VISITS CAN BE VIRTUAL.  (THIS STATEMENT SHOULD BE DELETED.)    Last Office Visit with [facility] primary care provider: 05/07/2019    Future Office visit:     Controlled substance agreement:   Encounter-Level CSA:    There are no encounter-level csa.     Patient-Level CSA:    There are no patient-level csa.         Last Urine Drug Screen: No results found for: CDAUT, No results found for: COMDAT, No results found for: THC13, PCP13, COC13, MAMP13, OPI13, AMP13, BZO13, TCA13, MTD13, BAR13, OXY13, PPX13, BUP13         [email or web address]       checked in past 3 months?  No, route to CANDY Deluna MA

## 2019-06-21 ENCOUNTER — OFFICE VISIT (OUTPATIENT)
Dept: FAMILY MEDICINE | Facility: CLINIC | Age: 54
End: 2019-06-21
Payer: COMMERCIAL

## 2019-06-21 VITALS
WEIGHT: 181 LBS | TEMPERATURE: 98.9 F | BODY MASS INDEX: 27.52 KG/M2 | HEART RATE: 98 BPM | OXYGEN SATURATION: 97 % | DIASTOLIC BLOOD PRESSURE: 68 MMHG | SYSTOLIC BLOOD PRESSURE: 110 MMHG

## 2019-06-21 DIAGNOSIS — M25.511 CHRONIC PAIN OF BOTH SHOULDERS: ICD-10-CM

## 2019-06-21 DIAGNOSIS — M54.2 NECK PAIN, CHRONIC: ICD-10-CM

## 2019-06-21 DIAGNOSIS — M25.512 CHRONIC PAIN OF BOTH SHOULDERS: ICD-10-CM

## 2019-06-21 DIAGNOSIS — G89.29 NECK PAIN, CHRONIC: ICD-10-CM

## 2019-06-21 DIAGNOSIS — Z01.818 PREOP GENERAL PHYSICAL EXAM: Primary | ICD-10-CM

## 2019-06-21 DIAGNOSIS — G89.29 CHRONIC PAIN OF BOTH SHOULDERS: ICD-10-CM

## 2019-06-21 PROCEDURE — 99214 OFFICE O/P EST MOD 30 MIN: CPT | Performed by: FAMILY MEDICINE

## 2019-06-21 ASSESSMENT — PATIENT HEALTH QUESTIONNAIRE - PHQ9: SUM OF ALL RESPONSES TO PHQ QUESTIONS 1-9: 8

## 2019-06-21 NOTE — PROGRESS NOTES
[facility]  [address] [place] [id number]  [phone]  Dept: [phone]    PRE-OP EVALUATION:  Today's date: 2019    Pop Eaton III (: 1965) presents for pre-operative evaluation assessment as requested by Dr. Bunch.  He requires evaluation and anesthesia risk assessment prior to undergoing surgery/procedure for treatment of neck .    Fax number for surgical facility:   Primary Physician: Ayush Santos  Type of Anesthesia Anticipated: to be determined    Patient has a Health Care Directive or Living Will:  NO    Preop Questions 2019   Who is doing your surgery? dr bunch   What are you having done? -   Date of Surgery/Procedure: 19   Facility or Hospital where [facility]   1.  Do you have a history of Heart attack, stroke, stent, coronary bypass surgery, or other heart surgery? No   2.  Do you ever have any pain or discomfort in your chest? No   3.  Do you have a history of  Heart Failure? No   4.   Are you troubled by shortness of breath when:  walking on a level surface, or up a slight hill, or at night? No   5.  Do you currently have a cold, bronchitis or other respiratory infection? No   6.  Do you have a cough, shortness of breath, or wheezing? No   7.  Do you sometimes get pains in the calves of your legs when you walk? No   8. Do you or anyone in your family have previous history of blood clots? No   9.  Do you or does anyone in your family have a serious bleeding problem such as prolonged bleeding following surgeries or cuts? No   10. Have you ever had problems with anemia or been told to take iron pills? No   11. Have you had any abnormal blood loss such as black, tarry or bloody stools? No   12. Have you ever had a blood transfusion? No   13. Have you or any of your relatives ever had problems with anesthesia? No   14. Do you have sleep apnea, excessive snoring or daytime drowsiness? No   15. Do you  have any prosthetic heart valves? No   16. Do you have prosthetic joints? No         HPI:     HPI related to upcoming procedure: Neck pain with shoulder pain surgery panned;   POSTERIOR FUSION C5-6 WITH ILIAC BONE GRAFT     See problem list for active medical problems.     Smoking: Quit 4 days    MEDICAL HISTORY:     Patient Active Problem List    Diagnosis Date Noted     Bilateral shoulder pain 05/21/2019     Priority: Medium     Family history of prostate cancer 08/07/2018     Priority: Medium     Restless legs syndrome 08/07/2018     Priority: Medium     History of colonic polyps, 2/2017 02/15/2017     Priority: Medium      History reviewed. No pertinent past medical history.  Past Surgical History:   Procedure Laterality Date     ARTHROSCOPY KNEE RT/LT Left 2013    meniscal tear      ARTHROSCOPY KNEE RT/LT Right 10/2016    medial meniscal tear      COLONOSCOPY  02/2017    Q 3 years for polyps     COLONOSCOPY WITH CO2 INSUFFLATION N/A 2/15/2017    Procedure: COLONOSCOPY WITH CO2 INSUFFLATION;  Surgeon: Galileo Lezama DO;  Location: [facility] OR     LAPAROSCOPIC APPENDECTOMY  2010     LAPAROSCOPIC CHOLECYSTECTOMY  2010     SURGICAL HISTORY OF -  Left 1995    corneal transplant X 2 on left eye     Current Outpatient Medications   Medication Sig Dispense Refill     acetaminophen (TYLENOL ARTHRITIS PAIN) 650 MG CR tablet Take 1 tablet (650 mg) by mouth every 8 hours as needed for mild pain or fever 60 tablet      ALPRAZolam (XANAX) 0.5 MG tablet Take 1 tablet (0.5 mg) by mouth daily as needed for anxiety (30 minutes before flight) 10 tablet 0     fluticasone (FLONASE) 50 MCG/ACT spray Spray 1-2 sprays into both nostrils daily 3 Bottle 11     mometasone (ELOCON) 0.1 % ointment Apply sparingly to affected area twice daily as needed.  Do not apply to face. 45 g 3     psyllium (METAMUCIL) 58.6 % POWD Take one tablespoon daily       traMADol (ULTRAM) 50 MG tablet Take 50 mg by mouth 2 times daily        OTC products: None,  except as noted above    No Known Allergies   Latex Allergy: NO    Social History     Tobacco Use     Smoking status: Light Tobacco Smoker     Packs/day: 0.04     Types: Cigarettes     Smokeless tobacco: Never Used   Substance Use Topics     Alcohol use: Yes     Alcohol/week: 0.0 oz     Comment: 1-2 drinks per month      History   Drug Use No       REVIEW OF SYSTEMS:   Constitutional, HEENT, cardiovascular, pulmonary, gi and gu systems are negative, except as otherwise noted.    EXAM:   /68   Pulse 98   Temp 98.9  F (37.2  C) (Oral)   Wt 82.1 kg (181 lb)   SpO2 97%   BMI 27.52 kg/m      GENERAL APPEARANCE: healthy, alert and in mild distress     EYES: EOMI,  PERRL     HENT: ear canals and TM's normal and nose and mouth without ulcers or lesions     NECK: no adenopathy and thyroid normal to palpation     RESP: lungs clear to auscultation - no rales, rhonchi or wheezes     CV: regular rates and rhythm, normal S1 S2, no S3 or S4 and no murmur, click or rub     ABDOMEN:  soft, nontender, no HSM or masses and bowel sounds normal     SKIN: no suspicious lesions or rashes     NEURO: Normal strength and tone, sensory exam grossly normal, mentation intact and speech normal     PSYCH: mentation appears normal. and affect normal/bright    DIAGNOSTICS:   EKG: appears normal, NSR, normal axis, normal intervals, no acute ST/T changes c/w ischemia, no LVH by voltage criteria    Recent Labs   Lab Test 05/07/19  1437 02/20/19  0932 08/07/18  1610 07/30/18  1037   HGB  --   --  16.7 17.1   PLT  --   --  239 184   INR  --  0.84*  --   --    NA  --  140  --  142   POTASSIUM  --  4.4  --  4.1   CR  --  1.07  --  1.10   A1C 5.5  --   --   --         IMPRESSION:   Reason for surgery/procedure: Neck/Shoulder pain/Posterior Fusion  Diagnosis/reason for consult: Neck/Shoulder/Pre OP    The proposed surgical procedure is considered INTERMEDIATE risk.    REVISED CARDIAC RISK INDEX  The patient has the following serious  cardiovascular risks for perioperative complications such as (MI, PE, VFib and 3  AV Block):  No serious cardiac risks  INTERPRETATION: 1 risks: Class II (low risk - 0.9% complication rate)    The patient has the following additional risks for perioperative complications:  No identified additional risks      ICD-10-CM    1. Preop general physical exam Z01.818    2. Chronic pain of both shoulders M25.511     G89.29     M25.512    3. Neck pain, chronic M54.2     G89.29        RECOMMENDATIONS:       --Patient is to take all scheduled medications on the day of surgery EXCEPT for modifications listed below.  --Patient is on no chronic medications    APPROVAL GIVEN to proceed with proposed procedure, without further diagnostic evaluation     Signed Electronically by: Ayush Santos MD    Copy of this evaluation report is provided to requesting physician.    [facility] Preop Guidelines    Revised Cardiac Risk Index

## 2019-06-21 NOTE — PATIENT INSTRUCTIONS
Before Your Surgery      Call your surgeon if there is any change in your health. This includes signs of a cold or flu (such as a sore throat, runny nose, cough, rash or fever).    Do not smoke, drink alcohol or take over the counter medicine (unless your surgeon or primary care doctor tells you to) for the 24 hours before and after surgery.    If you take prescribed drugs: Follow your doctor s orders about which medicines to take and which to stop until after surgery.    Eating and drinking prior to surgery: follow the instructions from your surgeon    Take a shower or bath the night before surgery. Use the soap your surgeon gave you to gently clean your skin. If you do not have soap from your surgeon, use your regular soap. Do not shave or scrub the surgery site.  Wear clean pajamas and have clean sheets on your bed.     Recommendation:  --Patient is to take all scheduled medications on the day of surgery EXCEPT for modifications listed below.  --Patient is on no chronic medications  APPROVAL GIVEN to proceed with proposed procedure, without further diagnostic evaluation  Signed Electronically by: Ayush Santos MD  Copy of this evaluation report is provided to requesting physician.

## 2019-06-21 NOTE — LETTER
My Depression Action Plan  Name: Pop Eaton III   Date of Birth 1965  Date: 6/21/2019    My doctor: Ayush Santos   My clinic: [facility]  [address]  [place] [address]  [phone]          GREEN    ZONE   Good Control    What it looks like:     Things are going generally well. You have normal up s and down s. You may even feel depressed from time to time, but bad moods usually last less than a day.   What you need to do:  1. Continue to care for yourself (see self care plan)  2. Check your depression survival kit and update it as needed  3. Follow your physician s recommendations including any medication.  4. Do not stop taking medication unless you consult with your physician first.           YELLOW         ZONE Getting Worse    What it looks like:     Depression is starting to interfere with your life.     It may be hard to get out of bed; you may be starting to isolate yourself from others.    Symptoms of depression are starting to last most all day and this has happened for several days.     You may have suicidal thoughts but they are not constant.   What you need to do:     1. Call your care team, your response to treatment will improve if you keep your care team informed of your progress. Yellow periods are signs an adjustment may need to be made.     2. Continue your self-care, even if you have to fake it!    3. Talk to someone in your support network    4. Open up your depression survival kit           RED    ZONE Medical Alert - Get Help    What it looks like:     Depression is seriously interfering with your life.     You may experience these or other symptoms: You can t get out of bed most days, can t work or engage in other necessary activities, you have trouble taking care of basic hygiene, or basic responsibilities, thoughts of suicide or death that will not go away, self-injurious behavior.     What you need to do:  1. Call your care  team and request a same-day appointment. If they are not available (weekends or after hours) call your local crisis line, emergency room or 911.            Depression Self Care Plan / Survival Kit    Self-Care for Depression  Here s the deal. Your body and mind are really not as separate as most people think.  What you do and think affects how you feel and how you feel influences what you do and think. This means if you do things that people who feel good do, it will help you feel better.  Sometimes this is all it takes.  There is also a place for medication and therapy depending on how severe your depression is, so be sure to consult with your medical provider and/ or Behavioral Health Consultant if your symptoms are worsening or not improving.     In order to better manage my stress, I will:    Exercise  Get some form of exercise, every day. This will help reduce pain and release endorphins, the  feel good  chemicals in your brain. This is almost as good as taking antidepressants!  This is not the same as joining a gym and then never going! (they count on that by the way ) It can be as simple as just going for a walk or doing some gardening, anything that will get you moving.      Hygiene   Maintain good hygiene (Get out of bed in the morning, Make your bed, Brush your teeth, Take a shower, and Get dressed like you were going to work, even if you are unemployed).  If your clothes don't fit try to get ones that do.    Diet  I will strive to eat foods that are good for me, drink plenty of water, and avoid excessive sugar, caffeine, alcohol, and other mood-altering substances.  Some foods that are helpful in depression are: complex carbohydrates, B vitamins, flaxseed, fish or fish oil, fresh fruits and vegetables.    Psychotherapy  I agree to participate in Individual Therapy (if recommended).    Medication  If prescribed medications, I agree to take them.  Missing doses can result in serious side effects.  I  understand that drinking alcohol, or other illicit drug use, may cause potential side effects.  I will not stop my medication abruptly without first discussing it with my provider.    Staying Connected With Others  I will stay in touch with my friends, family members, and my primary care provider/team.    Use your imagination  Be creative.  We all have a creative side; it doesn t matter if it s oil painting, sand castles, or mud pies! This will also kick up the endorphins.    Witness Beauty  (AKA stop and smell the roses) Take a look outside, even in mid-winter. Notice colors, textures. Watch the squirrels and birds.     Service to others  Be of service to others.  There is always someone else in need.  By helping others we can  get out of ourselves  and remember the really important things.  This also provides opportunities for practicing all the other parts of the program.    Humor  Laugh and be silly!  Adjust your TV habits for less news and crime-drama and more comedy.    Control your stress  Try breathing deep, massage therapy, biofeedback, and meditation. Find time to relax each day.     My support system    Clinic Contact:  Phone number:    Contact 1:  Phone number:    Contact 2:  Phone number:    Yarsani/:  Phone number:    Therapist:  Phone number:    Local crisis center:    Phone number:    Other community support:  Phone number:

## 2019-06-26 ENCOUNTER — TRANSFERRED RECORDS (OUTPATIENT)
Dept: HEALTH INFORMATION MANAGEMENT | Facility: CLINIC | Age: 54
End: 2019-06-26

## 2019-07-22 PROBLEM — M25.512 BILATERAL SHOULDER PAIN: Status: RESOLVED | Noted: 2019-05-21 | Resolved: 2019-07-22

## 2019-07-22 PROBLEM — M25.511 BILATERAL SHOULDER PAIN: Status: RESOLVED | Noted: 2019-05-21 | Resolved: 2019-07-22

## 2019-07-22 NOTE — PROGRESS NOTES
Subjective:  HPI                    Objective:  System    Physical Exam    General     ROS    Assessment/Plan:    DISCHARGE REPORT    Progress reporting period is from 5/28/19 to 7/22/19.     SUBJECTIVE  Subjective: pt reports that he has the same shoulder pain on both sides and he is unable to do sidelying exercises because of pain with this position.                 ;   ,     Patient has failed to return to therapy so current objective findings are unknown.  The subjective and objective information are from the last SOAP note on this patient.    OBJECTIVE  Objective: mod loss cervical rotation AROM bilaterally, mod loss AROM lateral flexion bilaterally, major loss extension, mod loss flexion. No pain with motions      ASSESSMENT/PLAN  Updated problem list and treatment plan: Diagnosis 1:  Right Shoulder Pain    STG/LTGs have been met or progress has been made towards goals:  Yes (See Goal flow sheet completed today.)  Assessment of Progress: The patient has not returned to therapy. Current status is unknown.  Self Management Plans:  Patient has been instructed in a home treatment program.  Patient  has been instructed in self management of symptoms.  I have re-evaluated this patient and find that the nature, scope, duration and intensity of the therapy is appropriate for the medical condition of the patient.  Pop continues to require the following intervention to meet STG and LTG's: PT intervention is no longer required to meet STG/LTG.  The patient failed to complete scheduled/ordered appointments so current information is unknown.  We will discharge this patient from PT.    Recommendations:  This patient is ready to be discharged from therapy and continue their home treatment program.    Please refer to the daily flowsheet for treatment today, total treatment time and time spent performing 1:1 timed codes.

## 2019-11-12 NOTE — PROGRESS NOTES
[facility]  [address] [place] [address]  [phone]  Dept: [phone]    PRE-OP EVALUATION:  Today's date: 2019    Pop Eaton III (: 1965) presents for pre-operative evaluation assessment as requested by Dr. William Russell.  He requires evaluation and anesthesia risk assessment prior to undergoing surgery/procedure for treatment of rotator cuff tear Right.    Fax number for surgical facility: [phone]  Primary Physician: Ayush Santos  Type of Anesthesia Anticipated: to be determined    Patient has a Health Care Directive or Living Will:  NO    Preop Questions 2019   Who is doing your surgery? ector   What are you having done? shoulder and bicept   Date of Surgery/Procedure: 19   Facility or [facility] where [facility]   1.  Do you have a history of Heart attack, stroke, stent, coronary bypass surgery, or other heart surgery? No   2.  Do you ever have any pain or discomfort in your chest? No   3.  Do you have a history of  Heart Failure? No   4.   Are you troubled by shortness of breath when:  walking on a level surface, or up a slight hill, or at night? No   5.  Do you currently have a cold, bronchitis or other respiratory infection? No   6.  Do you have a cough, shortness of breath, or wheezing? No   7.  Do you sometimes get pains in the calves of your legs when you walk? No   8. Do you or anyone in your family have previous history of blood clots? No   9.  Do you or does anyone in your family have a serious bleeding problem such as prolonged bleeding following surgeries or cuts? No   10. Have you ever had problems with anemia or been told to take iron pills? No   11. Have you had any abnormal blood loss such as black, tarry or bloody stools? No   12. Have you ever had a blood transfusion? No   13. Have you or any of your relatives ever had problems with anesthesia? No   14. Do you have  sleep apnea, excessive snoring or daytime drowsiness? No   15. Do you have any prosthetic heart valves? No   16. Do you have prosthetic joints? No         HPI:     HPI related to upcoming procedure: Shoulder and neck pain x 1 year.       See problem list for active medical problems.  Problems all longstanding and stable, except as noted/documented.  See ROS for pertinent symptoms related to these conditions.      MEDICAL HISTORY:     Patient Active Problem List    Diagnosis Date Noted     Family history of prostate cancer 08/07/2018     Priority: Medium     Restless legs syndrome 08/07/2018     Priority: Medium     History of colonic polyps, 2/2017 02/15/2017     Priority: Medium      History reviewed. No pertinent past medical history.  Past Surgical History:   Procedure Laterality Date     ARTHROSCOPY KNEE RT/LT Left 2013    meniscal tear      ARTHROSCOPY KNEE RT/LT Right 10/2016    medial meniscal tear      COLONOSCOPY  02/2017    Q 3 years for polyps     COLONOSCOPY WITH CO2 INSUFFLATION N/A 2/15/2017    Procedure: COLONOSCOPY WITH CO2 INSUFFLATION;  Surgeon: Galileo Lezama DO;  Location: [facility] OR     LAPAROSCOPIC APPENDECTOMY  2010     LAPAROSCOPIC CHOLECYSTECTOMY  2010     SURGICAL HISTORY OF -  Left 1995    corneal transplant X 2 on left eye     Current Outpatient Medications   Medication Sig Dispense Refill     acetaminophen (TYLENOL ARTHRITIS PAIN) 650 MG CR tablet Take 1 tablet (650 mg) by mouth every 8 hours as needed for mild pain or fever 60 tablet      fluticasone (FLONASE) 50 MCG/ACT spray Spray 1-2 sprays into both nostrils daily 3 Bottle 11     mometasone (ELOCON) 0.1 % ointment Apply sparingly to affected area twice daily as needed.  Do not apply to face. 45 g 3     psyllium (METAMUCIL) 58.6 % POWD Take one tablespoon daily       ALPRAZolam (XANAX) 0.5 MG tablet Take 1 tablet (0.5 mg) by mouth daily as needed for anxiety (30 minutes before flight) (Patient not taking: Reported on 11/14/2019) 10  "tablet 0     traMADol (ULTRAM) 50 MG tablet Take 50 mg by mouth 2 times daily        OTC products: None, except as noted above    No Known Allergies   Latex Allergy: NO    Social History     Tobacco Use     Smoking status: Light Tobacco Smoker     Packs/day: 0.04     Types: Cigarettes     Smokeless tobacco: Never Used   Substance Use Topics     Alcohol use: Yes     Alcohol/week: 0.0 standard drinks     Comment: 1-2 drinks per month      History   Drug Use No       REVIEW OF SYSTEMS:   CONSTITUTIONAL: NEGATIVE for fever, chills, change in weight  INTEGUMENTARY/SKIN: NEGATIVE for worrisome rashes, moles or lesions  EYES: NEGATIVE for vision changes or irritation  ENT/MOUTH: NEGATIVE for ear, mouth and throat problems  RESP: NEGATIVE for significant cough or SOB  BREAST: NEGATIVE for masses, tenderness or discharge  CV: NEGATIVE for chest pain, palpitations or peripheral edema  GI: NEGATIVE for nausea, abdominal pain, heartburn, or change in bowel habits  : NEGATIVE for frequency, dysuria, or hematuria  MUSCULOSKELETAL: NEGATIVE for significant arthralgias or myalgia  NEURO: NEGATIVE for weakness, dizziness or paresthesias  ENDOCRINE: NEGATIVE for temperature intolerance, skin/hair changes  HEME: NEGATIVE for bleeding problems  PSYCHIATRIC: NEGATIVE for changes in mood or affect    EXAM:   /70   Pulse 102   Temp 98.9  F (37.2  C) (Oral)   Resp 18   Ht 1.727 m (5' 8\")   Wt 80.5 kg (177 lb 6.4 oz)   SpO2 96%   BMI 26.97 kg/m      GENERAL APPEARANCE: healthy, alert and no distress     EYES: EOMI,  PERRL     HENT: ear canals and TM's normal and nose and mouth without ulcers or lesions     NECK: no adenopathy, no asymmetry, masses, or scars and thyroid normal to palpation     RESP: lungs clear to auscultation - no rales, rhonchi or wheezes     CV: regular rates and rhythm, normal S1 S2, no S3 or S4 and no murmur, click or rub     ABDOMEN:  soft, nontender, no HSM or masses and bowel sounds normal     MS: " extremities normal- no gross deformities noted, no evidence of inflammation in joints, FROM in all extremities.     SKIN: no suspicious lesions or rashes     NEURO: Normal strength and tone, sensory exam grossly normal, mentation intact and speech normal     PSYCH: mentation appears normal. and affect normal/bright     LYMPHATICS: No cervical adenopathy    DIAGNOSTICS:   EK/19 Sinus  Rhythm   -RSR(V1) -nondiagnostic.    -Left atrial enlargement.     Recent Labs   Lab Test 19  1437 19  0932 18  1610 18  1037   HGB  --   --  16.7 17.1   PLT  --   --  239 184   INR  --  0.84*  --   --    NA  --  140  --  142   POTASSIUM  --  4.4  --  4.1   CR  --  1.07  --  1.10   A1C 5.5  --   --   --         IMPRESSION:   The proposed surgical procedure is considered INTERMEDIATE risk.    REVISED CARDIAC RISK INDEX  The patient has the following serious cardiovascular risks for perioperative complications such as (MI, PE, VFib and 3  AV Block):  No serious cardiac risks  INTERPRETATION: 0 risks: Class I (very low risk - 0.4% complication rate)    The patient has the following additional risks for perioperative complications:  No identified additional risks      ICD-10-CM    1. Preop general physical exam Z01.818 *UA reflex to Microscopic and Culture ([facility] (except [place] and [facility])     Hemoglobin     Urine Microscopic   2. Nontraumatic tear of right rotator cuff, unspecified tear extent M75.101        RECOMMENDATIONS:     APPROVAL GIVEN to proceed with proposed procedure, without further diagnostic evaluation       Signed Electronically by: Brady Mckenna PA-C    Copy of this evaluation report is provided to requesting physician.    [facility] Preop Guidelines    Revised Cardiac Risk Index

## 2019-11-14 ENCOUNTER — OFFICE VISIT (OUTPATIENT)
Dept: FAMILY MEDICINE | Facility: CLINIC | Age: 54
End: 2019-11-14
Payer: COMMERCIAL

## 2019-11-14 VITALS
HEIGHT: 68 IN | DIASTOLIC BLOOD PRESSURE: 70 MMHG | OXYGEN SATURATION: 96 % | BODY MASS INDEX: 26.89 KG/M2 | SYSTOLIC BLOOD PRESSURE: 126 MMHG | TEMPERATURE: 98.9 F | WEIGHT: 177.4 LBS | HEART RATE: 102 BPM | RESPIRATION RATE: 18 BRPM

## 2019-11-14 DIAGNOSIS — M75.101 NONTRAUMATIC TEAR OF RIGHT ROTATOR CUFF, UNSPECIFIED TEAR EXTENT: ICD-10-CM

## 2019-11-14 DIAGNOSIS — Z01.818 PREOP GENERAL PHYSICAL EXAM: Primary | ICD-10-CM

## 2019-11-14 LAB
ALBUMIN UR-MCNC: NEGATIVE MG/DL
APPEARANCE UR: CLEAR
BILIRUB UR QL STRIP: NEGATIVE
COLOR UR AUTO: YELLOW
GLUCOSE UR STRIP-MCNC: NEGATIVE MG/DL
HGB BLD-MCNC: 17.5 G/DL (ref 13.3–17.7)
HGB UR QL STRIP: ABNORMAL
KETONES UR STRIP-MCNC: NEGATIVE MG/DL
LEUKOCYTE ESTERASE UR QL STRIP: NEGATIVE
MUCOUS THREADS #/AREA URNS LPF: PRESENT /LPF
NITRATE UR QL: NEGATIVE
NON-SQ EPI CELLS #/AREA URNS LPF: ABNORMAL /LPF
PH UR STRIP: 5 PH (ref 5–7)
RBC #/AREA URNS AUTO: ABNORMAL /HPF
SOURCE: ABNORMAL
SP GR UR STRIP: 1.02 (ref 1–1.03)
UROBILINOGEN UR STRIP-ACNC: 0.2 EU/DL (ref 0.2–1)
WBC #/AREA URNS AUTO: ABNORMAL /HPF

## 2019-11-14 PROCEDURE — 36415 COLL VENOUS BLD VENIPUNCTURE: CPT | Performed by: PHYSICIAN ASSISTANT

## 2019-11-14 PROCEDURE — 85018 HEMOGLOBIN: CPT | Performed by: PHYSICIAN ASSISTANT

## 2019-11-14 PROCEDURE — 99214 OFFICE O/P EST MOD 30 MIN: CPT | Performed by: PHYSICIAN ASSISTANT

## 2019-11-14 PROCEDURE — 81001 URINALYSIS AUTO W/SCOPE: CPT | Performed by: PHYSICIAN ASSISTANT

## 2019-11-14 ASSESSMENT — MIFFLIN-ST. JEOR: SCORE: 1624.18

## 2019-12-20 ENCOUNTER — THERAPY VISIT (OUTPATIENT)
Dept: PHYSICAL THERAPY | Facility: CLINIC | Age: 54
End: 2019-12-20
Payer: COMMERCIAL

## 2019-12-20 DIAGNOSIS — M25.511 ACUTE PAIN OF RIGHT SHOULDER: ICD-10-CM

## 2019-12-20 DIAGNOSIS — Z98.890 S/P RIGHT ROTATOR CUFF REPAIR: Primary | ICD-10-CM

## 2019-12-20 DIAGNOSIS — Z98.890 STATUS POST SUBACROMIAL DECOMPRESSION: ICD-10-CM

## 2019-12-20 PROCEDURE — 97110 THERAPEUTIC EXERCISES: CPT | Mod: GP | Performed by: PHYSICAL THERAPIST

## 2019-12-20 PROCEDURE — 97161 PT EVAL LOW COMPLEX 20 MIN: CPT | Mod: GP | Performed by: PHYSICAL THERAPIST

## 2019-12-20 NOTE — PROGRESS NOTES
[facility] Initial Evaluation  Subjective:  The history is provided by the patient. No  was used.   Pop Eaton III being seen for Surgery for RCR, SAD, biceps tendon transfer.   Date of Onset: November 2018. Where condition occurred: at home.Problem occurred: Chronic wear and tear  and reported as 7/10 on pain scale.  Pertinent medical history includes:  Cancer and implanted device.    Surgeries include:  Orthopedic surgery and cancer surgery. Other surgery history details: Melonoma, Knee, Neck, Shoulder.  Current medications:  Pain medication.   Primary job tasks include:  Pushing/pulling, repetitive tasks, prolonged standing, lifting/carrying and driving.  Pain is described as aching and is constant. Pain is the same all the time. Since onset symptoms are gradually improving.      Patient is  for [employer]. Restrictions include:  Currently not working due to present treatment.    Barriers include:  None as reported by patient.  Red flags:  None as reported by patient.  Pt was discontinued from sling 2 weeks post-op.                     Objective:    Gait:    Gait Type:  Normal   Assistive Devices:  None        Neurological: He is alert and oriented to person, place, and time.                      Shoulder Evaluation:  ROM:  AROM:    Flexion:  Left:  160    Right:  90        Internal Rotation:  Left:  85    Right:  45  External Rotation:  Left:  85    Right:  15                      Strength:  not assessed                                                               General     ROS    Assessment/Plan:    Patient is a 54 year old male with right side shoulder complaints.    Patient has the following significant findings with corresponding treatment plan.                Diagnosis 1:  R Shoulder RCR, SAD, Biceps Tendon Transfer  Pain -  hot/cold therapy, electric stimulation, manual therapy, education and home program  Decreased ROM/flexibility -  manual therapy, therapeutic exercise, therapeutic activity and home program  Decreased joint mobility - manual therapy, therapeutic exercise, therapeutic activity and home program  Decreased strength - therapeutic exercise, therapeutic activities and home program  Inflammation - cold therapy and self management/home program    Therapy Evaluation Codes:   1) History comprised of:   Personal factors that impact the plan of care:      None.    Comorbidity factors that impact the plan of care are:      Cancer.     Medications impacting care: Pain.  2) Examination of Body Systems comprised of:   Body structures and functions that impact the plan of care:      Shoulder.   Activity limitations that impact the plan of care are:      Bathing, Cooking, Driving, Dressing, Lifting, Throwing, Sleeping and Laying down.  3) Clinical presentation characteristics are:   Stable/Uncomplicated.  4) Decision-Making    Low complexity using standardized patient assessment instrument and/or measureable assessment of functional outcome.  Cumulative Therapy Evaluation is: Low complexity.    Previous and current functional limitations:  (See Goal Flow Sheet for this information)    Short term and Long term goals: (See Goal Flow Sheet for this information)     Communication ability:  Patient appears to be able to clearly communicate and understand verbal and written communication and follow directions correctly.  Treatment Explanation - The following has been discussed with the patient:   RX ordered/plan of care  Anticipated outcomes  Possible risks and side effects  This patient would benefit from PT intervention to resume normal activities.   Rehab potential is good.    Frequency:  1-2 X week, once daily  Duration:  for 8 weeks  Discharge Plan:  Achieve all LTG.  Independent in home treatment program.  Return to work with or without restrictions.  Return to previous functional level by discharge.  Reach maximal therapeutic benefit.    Please  refer to the daily flowsheet for treatment today, total treatment time and time spent performing 1:1 timed codes.

## 2019-12-20 NOTE — LETTER
MARLENY BIRDINE [facility]  [address]  [place] [address]  [phone]    2019    Re: Pop Eaton III   :   1965  MRN:  [id number]   REFERRING PHYSICIAN:   William GALDAMEZ [facility]    Date of Initial Evaluation:  19  Visits:  Rxs Used: 1  Reason for Referral:     S/P right rotator cuff repair  Acute pain of right shoulder  Status post subacromial decompression    [facility] Initial Evaluation    Subjective:  The history is provided by the patient. No  was used.   Pop Eaton III being seen for Surgery for RCR, SAD, biceps tendon transfer.   Date of Onset: 2018. Where condition occurred: at home.Problem occurred: Chronic wear and tear  and reported as 7/10 on pain scale.  Pertinent medical history includes:  Cancer and implanted device.    Surgeries include:  Orthopedic surgery and cancer surgery. Other surgery history details: Melonoma, Knee, Neck, Shoulder.  Current medications:  Pain medication.   Primary job tasks include:  Pushing/pulling, repetitive tasks, prolonged standing, lifting/carrying and driving.  Pain is described as aching and is constant. Pain is the same all the time. Since onset symptoms are gradually improving.      Patient is  for [employer]. Restrictions include:  Currently not working due to present treatment.  Barriers include:  None as reported by patient.  Red flags:  None as reported by patient.  Pt was discontinued from sling 2 weeks post-op.                   Objective:    Gait:    Gait Type:  Normal   Assistive Devices:  None  Neurological: He is alert and oriented to person, place, and time.     Shoulder Evaluation:  ROM:  AROM:    Flexion:  Left:  160    Right:  90  Internal Rotation:  Left:  85    Right:  45  External Rotation:  Left:  85    Right:  15    Strength:  not assessed    Assessment/Plan:    Patient is a 54 year old male with right side shoulder  complaints.    Patient has the following significant findings with corresponding treatment plan.                Diagnosis 1:  R Shoulder RCR, SAD, Biceps Tendon Transfer  Pain -  hot/cold therapy, electric stimulation, manual therapy, education and home program  Decreased ROM/flexibility - manual therapy, therapeutic exercise, therapeutic activity and home program  Decreased joint mobility - manual therapy, therapeutic exercise, therapeutic activity and home program  Decreased strength - therapeutic exercise, therapeutic activities and home program  Inflammation - cold therapy and self management/home program    Therapy Evaluation Codes:   1) History comprised of:   Personal factors that impact the plan of care:      None.    Comorbidity factors that impact the plan of care are:      Cancer.     Medications impacting care: Pain.  2) Examination of Body Systems comprised of:   Body structures and functions that impact the plan of care:      Shoulder.   Activity limitations that impact the plan of care are:      Bathing, Cooking, Driving, Dressing, Lifting, Throwing, Sleeping and Laying down.  3) Clinical presentation characteristics are:   Stable/Uncomplicated.  4) Decision-Making    Low complexity using standardized patient assessment instrument and/or measureable assessment of functional outcome.    Cumulative Therapy Evaluation is: Low complexity.  Previous and current functional limitations:  (See Goal Flow Sheet for this information)    Short term and Long term goals: (See Goal Flow Sheet for this information)   Communication ability:  Patient appears to be able to clearly communicate and understand verbal and written communication and follow directions correctly.  Treatment Explanation - The following has been discussed with the patient:   RX ordered/plan of care  Anticipated outcomes  Possible risks and side effects  This patient would benefit from PT intervention to resume normal activities.   Rehab potential  is good.    Frequency:  1-2 X week, once daily  Duration:  for 8 weeks  Discharge Plan:  Achieve all LTG.  Independent in home treatment program.  Return to work with or without restrictions.  Return to previous functional level by discharge.  Reach maximal therapeutic benefit.    Thank you for your referral.    INQUIRIES  Therapist: Bal Castillo, PT, ATC, Cert. DEEPAK GALDAMEZ [facility]  [address]  [place] [address]  Phone: [phone]  Fax: [phone]

## 2019-12-27 ENCOUNTER — THERAPY VISIT (OUTPATIENT)
Dept: PHYSICAL THERAPY | Facility: CLINIC | Age: 54
End: 2019-12-27
Payer: COMMERCIAL

## 2019-12-27 DIAGNOSIS — Z98.890 STATUS POST SUBACROMIAL DECOMPRESSION: ICD-10-CM

## 2019-12-27 DIAGNOSIS — Z98.890 S/P RIGHT ROTATOR CUFF REPAIR: ICD-10-CM

## 2019-12-27 DIAGNOSIS — M25.511 ACUTE PAIN OF RIGHT SHOULDER: ICD-10-CM

## 2019-12-27 PROCEDURE — 97110 THERAPEUTIC EXERCISES: CPT | Mod: GP | Performed by: SPECIALIST/TECHNOLOGIST

## 2019-12-27 PROCEDURE — 97010 HOT OR COLD PACKS THERAPY: CPT | Mod: GP | Performed by: SPECIALIST/TECHNOLOGIST

## 2020-01-03 ENCOUNTER — THERAPY VISIT (OUTPATIENT)
Dept: PHYSICAL THERAPY | Facility: CLINIC | Age: 55
End: 2020-01-03
Payer: COMMERCIAL

## 2020-01-03 DIAGNOSIS — Z98.890 S/P RIGHT ROTATOR CUFF REPAIR: ICD-10-CM

## 2020-01-03 DIAGNOSIS — Z98.890 STATUS POST SUBACROMIAL DECOMPRESSION: ICD-10-CM

## 2020-01-03 DIAGNOSIS — M25.511 ACUTE PAIN OF RIGHT SHOULDER: ICD-10-CM

## 2020-01-03 PROCEDURE — 97110 THERAPEUTIC EXERCISES: CPT | Mod: GP | Performed by: PHYSICAL THERAPIST

## 2020-01-03 PROCEDURE — 97112 NEUROMUSCULAR REEDUCATION: CPT | Mod: GP | Performed by: PHYSICAL THERAPIST

## 2020-01-09 ENCOUNTER — THERAPY VISIT (OUTPATIENT)
Dept: PHYSICAL THERAPY | Facility: CLINIC | Age: 55
End: 2020-01-09
Payer: COMMERCIAL

## 2020-01-09 DIAGNOSIS — Z98.890 S/P RIGHT ROTATOR CUFF REPAIR: ICD-10-CM

## 2020-01-09 DIAGNOSIS — Z98.890 STATUS POST SUBACROMIAL DECOMPRESSION: ICD-10-CM

## 2020-01-09 DIAGNOSIS — M25.511 ACUTE PAIN OF RIGHT SHOULDER: ICD-10-CM

## 2020-01-09 PROCEDURE — 97110 THERAPEUTIC EXERCISES: CPT | Mod: GP | Performed by: PHYSICAL THERAPIST

## 2020-01-17 ENCOUNTER — THERAPY VISIT (OUTPATIENT)
Dept: PHYSICAL THERAPY | Facility: CLINIC | Age: 55
End: 2020-01-17
Payer: COMMERCIAL

## 2020-01-17 DIAGNOSIS — Z98.890 STATUS POST SUBACROMIAL DECOMPRESSION: ICD-10-CM

## 2020-01-17 DIAGNOSIS — M25.511 ACUTE PAIN OF RIGHT SHOULDER: ICD-10-CM

## 2020-01-17 DIAGNOSIS — Z98.890 S/P RIGHT ROTATOR CUFF REPAIR: ICD-10-CM

## 2020-01-17 PROCEDURE — 97112 NEUROMUSCULAR REEDUCATION: CPT | Mod: GP | Performed by: PHYSICAL THERAPIST

## 2020-01-17 PROCEDURE — 97110 THERAPEUTIC EXERCISES: CPT | Mod: GP | Performed by: PHYSICAL THERAPIST

## 2020-01-31 ENCOUNTER — THERAPY VISIT (OUTPATIENT)
Dept: PHYSICAL THERAPY | Facility: CLINIC | Age: 55
End: 2020-01-31
Payer: COMMERCIAL

## 2020-01-31 DIAGNOSIS — Z98.890 STATUS POST SUBACROMIAL DECOMPRESSION: ICD-10-CM

## 2020-01-31 DIAGNOSIS — Z98.890 S/P RIGHT ROTATOR CUFF REPAIR: ICD-10-CM

## 2020-01-31 DIAGNOSIS — M25.511 ACUTE PAIN OF RIGHT SHOULDER: ICD-10-CM

## 2020-01-31 PROCEDURE — 97112 NEUROMUSCULAR REEDUCATION: CPT | Mod: GP | Performed by: PHYSICAL THERAPIST

## 2020-01-31 PROCEDURE — 97110 THERAPEUTIC EXERCISES: CPT | Mod: GP | Performed by: PHYSICAL THERAPIST

## 2020-02-07 ENCOUNTER — THERAPY VISIT (OUTPATIENT)
Dept: PHYSICAL THERAPY | Facility: CLINIC | Age: 55
End: 2020-02-07
Payer: COMMERCIAL

## 2020-02-07 DIAGNOSIS — M25.511 ACUTE PAIN OF RIGHT SHOULDER: ICD-10-CM

## 2020-02-07 DIAGNOSIS — Z98.890 STATUS POST SUBACROMIAL DECOMPRESSION: ICD-10-CM

## 2020-02-07 DIAGNOSIS — Z98.890 S/P RIGHT ROTATOR CUFF REPAIR: ICD-10-CM

## 2020-02-07 PROCEDURE — 97112 NEUROMUSCULAR REEDUCATION: CPT | Mod: GP | Performed by: PHYSICAL THERAPIST

## 2020-02-07 PROCEDURE — 97110 THERAPEUTIC EXERCISES: CPT | Mod: GP | Performed by: PHYSICAL THERAPIST

## 2020-02-21 ENCOUNTER — THERAPY VISIT (OUTPATIENT)
Dept: PHYSICAL THERAPY | Facility: CLINIC | Age: 55
End: 2020-02-21
Payer: COMMERCIAL

## 2020-02-21 DIAGNOSIS — M25.511 ACUTE PAIN OF RIGHT SHOULDER: ICD-10-CM

## 2020-02-21 DIAGNOSIS — Z98.890 S/P RIGHT ROTATOR CUFF REPAIR: ICD-10-CM

## 2020-02-21 DIAGNOSIS — Z98.890 STATUS POST SUBACROMIAL DECOMPRESSION: ICD-10-CM

## 2020-02-21 PROCEDURE — 97110 THERAPEUTIC EXERCISES: CPT | Mod: GP | Performed by: PHYSICAL THERAPIST

## 2020-02-21 PROCEDURE — 97112 NEUROMUSCULAR REEDUCATION: CPT | Mod: GP | Performed by: PHYSICAL THERAPIST

## 2020-03-05 ENCOUNTER — THERAPY VISIT (OUTPATIENT)
Dept: PHYSICAL THERAPY | Facility: CLINIC | Age: 55
End: 2020-03-05
Payer: COMMERCIAL

## 2020-03-05 DIAGNOSIS — Z98.890 S/P RIGHT ROTATOR CUFF REPAIR: ICD-10-CM

## 2020-03-05 DIAGNOSIS — M25.511 ACUTE PAIN OF RIGHT SHOULDER: ICD-10-CM

## 2020-03-05 DIAGNOSIS — Z98.890 STATUS POST SUBACROMIAL DECOMPRESSION: ICD-10-CM

## 2020-03-05 PROCEDURE — 97112 NEUROMUSCULAR REEDUCATION: CPT | Mod: GP | Performed by: PHYSICAL THERAPIST

## 2020-03-05 PROCEDURE — 97110 THERAPEUTIC EXERCISES: CPT | Mod: GP | Performed by: PHYSICAL THERAPIST

## 2020-04-09 ENCOUNTER — TELEPHONE (OUTPATIENT)
Dept: PHYSICAL THERAPY | Facility: CLINIC | Age: 55
End: 2020-04-09

## 2020-04-28 ENCOUNTER — MYC MEDICAL ADVICE (OUTPATIENT)
Dept: FAMILY MEDICINE | Facility: CLINIC | Age: 55
End: 2020-04-28

## 2020-04-28 ASSESSMENT — ANXIETY QUESTIONNAIRES
2. NOT BEING ABLE TO STOP OR CONTROL WORRYING: NEARLY EVERY DAY
5. BEING SO RESTLESS THAT IT IS HARD TO SIT STILL: MORE THAN HALF THE DAYS
4. TROUBLE RELAXING: NEARLY EVERY DAY
7. FEELING AFRAID AS IF SOMETHING AWFUL MIGHT HAPPEN: NEARLY EVERY DAY
GAD7 TOTAL SCORE: 17
GAD7 TOTAL SCORE: 17
3. WORRYING TOO MUCH ABOUT DIFFERENT THINGS: MORE THAN HALF THE DAYS
6. BECOMING EASILY ANNOYED OR IRRITABLE: MORE THAN HALF THE DAYS
GAD7 TOTAL SCORE: 17
1. FEELING NERVOUS, ANXIOUS, OR ON EDGE: MORE THAN HALF THE DAYS
7. FEELING AFRAID AS IF SOMETHING AWFUL MIGHT HAPPEN: NEARLY EVERY DAY

## 2020-04-28 ASSESSMENT — PATIENT HEALTH QUESTIONNAIRE - PHQ9
SUM OF ALL RESPONSES TO PHQ QUESTIONS 1-9: 21
SUM OF ALL RESPONSES TO PHQ QUESTIONS 1-9: 21
10. IF YOU CHECKED OFF ANY PROBLEMS, HOW DIFFICULT HAVE THESE PROBLEMS MADE IT FOR YOU TO DO YOUR WORK, TAKE CARE OF THINGS AT HOME, OR GET ALONG WITH OTHER PEOPLE: VERY DIFFICULT

## 2020-04-29 ENCOUNTER — VIRTUAL VISIT (OUTPATIENT)
Dept: FAMILY MEDICINE | Facility: CLINIC | Age: 55
End: 2020-04-29
Payer: COMMERCIAL

## 2020-04-29 DIAGNOSIS — F32.1 MODERATE MAJOR DEPRESSION (H): ICD-10-CM

## 2020-04-29 DIAGNOSIS — F41.1 GAD (GENERALIZED ANXIETY DISORDER): Primary | ICD-10-CM

## 2020-04-29 DIAGNOSIS — F41.0 PANIC ATTACKS: ICD-10-CM

## 2020-04-29 PROCEDURE — 99214 OFFICE O/P EST MOD 30 MIN: CPT | Mod: 95 | Performed by: NURSE PRACTITIONER

## 2020-04-29 RX ORDER — ALPRAZOLAM 0.5 MG
0.5 TABLET ORAL 2 TIMES DAILY PRN
Qty: 20 TABLET | Refills: 0 | Status: SHIPPED | OUTPATIENT
Start: 2020-04-29 | End: 2020-06-01

## 2020-04-29 ASSESSMENT — ANXIETY QUESTIONNAIRES
2. NOT BEING ABLE TO STOP OR CONTROL WORRYING: NEARLY EVERY DAY
3. WORRYING TOO MUCH ABOUT DIFFERENT THINGS: NEARLY EVERY DAY
5. BEING SO RESTLESS THAT IT IS HARD TO SIT STILL: NEARLY EVERY DAY
GAD7 TOTAL SCORE: 17
7. FEELING AFRAID AS IF SOMETHING AWFUL MIGHT HAPPEN: NEARLY EVERY DAY
6. BECOMING EASILY ANNOYED OR IRRITABLE: NEARLY EVERY DAY
GAD7 TOTAL SCORE: 21
1. FEELING NERVOUS, ANXIOUS, OR ON EDGE: NEARLY EVERY DAY

## 2020-04-29 ASSESSMENT — PATIENT HEALTH QUESTIONNAIRE - PHQ9
SUM OF ALL RESPONSES TO PHQ QUESTIONS 1-9: 24
SUM OF ALL RESPONSES TO PHQ QUESTIONS 1-9: 21
5. POOR APPETITE OR OVEREATING: NEARLY EVERY DAY

## 2020-04-29 NOTE — PROGRESS NOTES
"Pop Eaton III is a 54 year old male who is being evaluated via a billable telephone visit.      The patient has been notified of following:     \"This telephone visit will be conducted via a call between you and your physician/provider. We have found that certain health care needs can be provided without the need for a physical exam.  This service lets us provide the care you need with a short phone conversation.  If a prescription is necessary we can send it directly to your pharmacy.  If lab work is needed we can place an order for that and you can then stop by our lab to have the test done at a later time.    Telephone visits are billed at different rates depending on your insurance coverage. During this emergency period, for some insurers they may be billed the same as an in-person visit.  Please reach out to your insurance provider with any questions.    If during the course of the call the physician/provider feels a telephone visit is not appropriate, you will not be charged for this service.\"    Patient has given verbal consent for Telephone visit?  Yes    How would you like to obtain your AVS? MyChart    Subjective     Pop Eaton III is a 54 year old male who presents to clinic today for the following health issues:    HPI  Abnormal Mood Symptoms  Onset: 6-12 months    Description:   Depression: YES  Anxiety: YES    Accompanying Signs & Symptoms:  Still participating in activities that you used to enjoy: no  Fatigue: YES  Irritability: no  Difficulty concentrating: YES  Changes in appetite: YES  Problems with sleep: YES  Heart racing/beating fast : YES  Thoughts of hurting yourself or others: none    History:   Recent stress: no  Prior depression hospitalization: None  Family history of depression: no  Family history of anxiety: no    Precipitating factors:   Alcohol/drug use: no    Alleviating factors:      Therapies Tried and outcome: None    Initially attributed symptoms to recent " surgeries and being off work for last 1.5 years.   Gets anxiety attacks- racing heartbeat, short of breath.   Very restless. Having crying episodes. Has fear of flying and lately every day feels like he's getting ready for a flight.  Symptoms worsened and knows he needs help.    Reviewed and updated as needed this visit by Provider         Review of Systems   ROS COMP: Constitutional, HEENT, cardiovascular, pulmonary, gi and gu systems are negative, except as otherwise noted.       Objective   Reported vitals:  There were no vitals taken for this visit.   healthy, alert and no distress  PSYCH: Alert and oriented times 3; coherent speech, normal   rate and volume, able to articulate logical thoughts, able   to abstract reason, no tangential thoughts, no hallucinations   or delusions  His affect is anxious and tearful  RESP: No cough, no audible wheezing, able to talk in full sentences  Remainder of exam unable to be completed due to telephone visits    Diagnostic Test Results:  Labs reviewed in Epic  No results found for this or any previous visit (from the past 24 hour(s)).        Assessment/Plan:  1. COMPA (generalized anxiety disorder)  Discussed options for treatment- could start with medications, therapy, or both. Patient opts for medications. If patient chooses to start medications, discussed it takes about 2 weeks for medications to be effective, patients with depression may have increased suicidal ideation at this time and should be alert to this. Reviewed the need for at least 6-12 months of medication once mood is stable, may then consider weaning down after discussing with provider. If patient chooses to start with therapy, recommend at least 3 visits minimum with a therapist, may need to see more than one therapist to find a good fit. Also discussed healthy lifestyle habits including exercise, diet, and regular sleep schedule.    - sertraline (ZOLOFT) 50 MG tablet; Take 1 tablet (50 mg) by mouth daily   Dispense: 30 tablet; Refill: 1    2. Moderate major depression (H)  As above  - sertraline (ZOLOFT) 50 MG tablet; Take 1 tablet (50 mg) by mouth daily  Dispense: 30 tablet; Refill: 1    3. Panic attacks  Patient declines trial of Atarax. Ok for PRN use of Xanax with intention to use sparingly- reviewed risks of overuse and dependency.   - ALPRAZolam (XANAX) 0.5 MG tablet; Take 1 tablet (0.5 mg) by mouth 2 times daily as needed for anxiety  Dispense: 20 tablet; Refill: 0    Return in about 6 weeks (around 6/10/2020) for Depression, Anxiety via video or telephone with PCP or me.      Phone call duration:  13 minutes    ADRIEN Olea CNP

## 2020-04-30 ASSESSMENT — ANXIETY QUESTIONNAIRES: GAD7 TOTAL SCORE: 21

## 2020-05-13 ENCOUNTER — MYC MEDICAL ADVICE (OUTPATIENT)
Dept: FAMILY MEDICINE | Facility: CLINIC | Age: 55
End: 2020-05-13

## 2020-05-13 NOTE — TELEPHONE ENCOUNTER
Please see [facility] message.   Patient had virtual visit with Maria Fernanda Main on 4/29/2020.    Etta Tello RN

## 2020-06-01 ENCOUNTER — MYC REFILL (OUTPATIENT)
Dept: FAMILY MEDICINE | Facility: CLINIC | Age: 55
End: 2020-06-01

## 2020-06-01 DIAGNOSIS — F41.0 PANIC ATTACKS: ICD-10-CM

## 2020-06-01 RX ORDER — ALPRAZOLAM 0.5 MG
TABLET ORAL
Qty: 20 TABLET | Refills: 0 | Status: CANCELLED | OUTPATIENT
Start: 2020-06-01

## 2020-06-02 NOTE — TELEPHONE ENCOUNTER
Duplicate, prescription was sent on 6/1/20.  Called pharmacy and confirmed order is ready for .   Haley Shaver RN

## 2020-06-02 NOTE — TELEPHONE ENCOUNTER
Controlled Substance Refill Request for ALPRAZolam (XANAX) 0.5 MG tablet   Problem List Complete:  No     PROVIDER TO CONSIDER COMPLETION OF PROBLEM LIST AND OVERVIEW/CONTROLLED SUBSTANCE AGREEMENT    Last Written Prescription Date:  06/01/2020  Last Fill Quantity: 20,   # refills: 0    THE MOST RECENT OFFICE VISIT MUST BE WITHIN THE PAST 3 MONTHS. AT LEAST ONE FACE TO FACE VISIT MUST OCCUR EVERY 6 MONTHS. ADDITIONAL VISITS CAN BE VIRTUAL.  (THIS STATEMENT SHOULD BE DELETED.)    Last Office Visit with [facility] primary care provider: Virtual Visit 04/29/2020 with Maria Fernanda Main    Future Office visit:     Controlled substance agreement:   Encounter-Level CSA:    There are no encounter-level csa.     Patient-Level CSA:    There are no patient-level csa.         Last Urine Drug Screen: No results found for: CDAUT, No results found for: COMDAT, No results found for: THC13, PCP13, COC13, MAMP13, OPI13, AMP13, BZO13, TCA13, MTD13, BAR13, OXY13, PPX13, BUP13     Processing:  Rx to be electronically transmitted to pharmacy by provider      [email or web address]       checked in past 3 months?  No, route to CANDY Deluna CMA

## 2020-06-02 NOTE — TELEPHONE ENCOUNTER
2nd request for ALPRAZolam (XANAX) 0.5 MG tablet    [id number] - [place] [id number] AT [facility]

## 2020-06-08 ENCOUNTER — VIRTUAL VISIT (OUTPATIENT)
Dept: FAMILY MEDICINE | Facility: CLINIC | Age: 55
End: 2020-06-08
Payer: COMMERCIAL

## 2020-06-08 DIAGNOSIS — F32.0 MILD MAJOR DEPRESSION (H): ICD-10-CM

## 2020-06-08 DIAGNOSIS — F41.1 GENERALIZED ANXIETY DISORDER: Primary | ICD-10-CM

## 2020-06-08 PROCEDURE — 99214 OFFICE O/P EST MOD 30 MIN: CPT | Mod: 95 | Performed by: FAMILY MEDICINE

## 2020-06-08 PROCEDURE — 96127 BRIEF EMOTIONAL/BEHAV ASSMT: CPT | Performed by: FAMILY MEDICINE

## 2020-06-08 RX ORDER — PROPRANOLOL HYDROCHLORIDE 20 MG/1
20 TABLET ORAL 2 TIMES DAILY PRN
Qty: 60 TABLET | Refills: 0 | Status: SHIPPED | OUTPATIENT
Start: 2020-06-08 | End: 2020-06-22

## 2020-06-08 RX ORDER — SERTRALINE HYDROCHLORIDE 100 MG/1
100 TABLET, FILM COATED ORAL DAILY
Qty: 30 TABLET | Refills: 1 | Status: SHIPPED | OUTPATIENT
Start: 2020-06-08 | End: 2020-07-13

## 2020-06-08 ASSESSMENT — ANXIETY QUESTIONNAIRES
3. WORRYING TOO MUCH ABOUT DIFFERENT THINGS: MORE THAN HALF THE DAYS
5. BEING SO RESTLESS THAT IT IS HARD TO SIT STILL: NEARLY EVERY DAY
GAD7 TOTAL SCORE: 19
1. FEELING NERVOUS, ANXIOUS, OR ON EDGE: NEARLY EVERY DAY
IF YOU CHECKED OFF ANY PROBLEMS ON THIS QUESTIONNAIRE, HOW DIFFICULT HAVE THESE PROBLEMS MADE IT FOR YOU TO DO YOUR WORK, TAKE CARE OF THINGS AT HOME, OR GET ALONG WITH OTHER PEOPLE: VERY DIFFICULT
7. FEELING AFRAID AS IF SOMETHING AWFUL MIGHT HAPPEN: NEARLY EVERY DAY
6. BECOMING EASILY ANNOYED OR IRRITABLE: NEARLY EVERY DAY
2. NOT BEING ABLE TO STOP OR CONTROL WORRYING: NEARLY EVERY DAY

## 2020-06-08 ASSESSMENT — PATIENT HEALTH QUESTIONNAIRE - PHQ9
5. POOR APPETITE OR OVEREATING: MORE THAN HALF THE DAYS
SUM OF ALL RESPONSES TO PHQ QUESTIONS 1-9: 17

## 2020-06-08 NOTE — PROGRESS NOTES
"Pop Eaton III is a 54 year old male who is being evaluated via a billable telephone visit.      The patient has been notified of following:     \"This telephone visit will be conducted via a call between you and your physician/provider. We have found that certain health care needs can be provided without the need for a physical exam.  This service lets us provide the care you need with a short phone conversation.  If a prescription is necessary we can send it directly to your pharmacy.  If lab work is needed we can place an order for that and you can then stop by our lab to have the test done at a later time.    Telephone visits are billed at different rates depending on your insurance coverage. During this emergency period, for some insurers they may be billed the same as an in-person visit.  Please reach out to your insurance provider with any questions.    If during the course of the call the physician/provider feels a telephone visit is not appropriate, you will not be charged for this service.\"    Patient has given verbal consent for Telephone visit?  Yes    What phone number would you like to be contacted at? [phone]     How would you like to obtain your AVS? Agathahart    Subjective     Pop Eaton III is a 54 year old male who presents via phone visit today for the following health issues:    HPI  Depression and Anxiety Follow-Up   Experiencing severe bouts of anxiety struggling to figure it out qa solution. Been depressed for about 2 yrs, had this recent surgeries thought would get better but has persisted.    He experiences losing breathe and feels like heart is pumping out of his chest, sweatiness and restlessness. Would last about 15 minutes  Can tell when coming; starts with pacing, twitching his legs and has to get up and walk around to feel better. Would settle when goes to his bed.    Happens every other day  Takes Zoloft 50 mg at night,       How are you doing with your depression " since your last visit? No change    How are you doing with your anxiety since your last visit?  No change    Are you having other symptoms that might be associated with depression or anxiety? No    Have you had a significant life event? No     Do you have any concerns with your use of alcohol or other drugs? No    Social History     Tobacco Use     Smoking status: Light Tobacco Smoker     Packs/day: 0.04     Types: Cigarettes     Smokeless tobacco: Never Used   Substance Use Topics     Alcohol use: Yes     Alcohol/week: 0.0 standard drinks     Comment: 1-2 drinks per month      Drug use: No     PHQ 4/28/2020 4/29/2020 6/8/2020   PHQ-9 Total Score 21 24 17   Q9: Thoughts of better off dead/self-harm past 2 weeks Not at all Not at all Not at all     COMPA-7 SCORE 4/28/2020 4/29/2020 6/8/2020   Total Score 17 (severe anxiety) - -   Total Score 17 21 19     Suicide Assessment Five-step Evaluation and Treatment (SAFE-T)      How many servings of fruits and vegetables do you eat daily?  4 or more    On average, how many sweetened beverages do you drink each day (Examples: soda, juice, sweet tea, etc.  Do NOT count diet or artificially sweetened beverages)?   0    How many days per week do you exercise enough to make your heart beat faster? 7    How many minutes a day do you exercise enough to make your heart beat faster? 60 or more    How many days per week do you miss taking your medication? 0    Assessment/Plan:  1. Generalized anxiety disorder    Associated with panic attacks. No clear triggers. The symptoms are mainly physical, discussed treated with a beta blocker and increase dose of Zoloft. Since this has been on going, will also follow up with therapy.  - sertraline (ZOLOFT) 100 MG tablet; Take 1 tablet (100 mg) by mouth daily  Dispense: 30 tablet; Refill: 1  - propranolol (INDERAL) 20 MG tablet; Take 1 tablet (20 mg) by mouth 2 times daily as needed (for anxiety)  Dispense: 60 tablet; Refill: 0  - MENTAL HEALTH  REFERRAL  - Adult; Outpatient Treatment; Individual/Couples/Family/Group Therapy/Health Psychology; [facility]: [facility] [phone]; We will contact you to schedule the appointment or please call with any questions    2. Mild major depression (H)    Increase Zoloft dose.  - MENTAL HEALTH REFERRAL  - Adult; Outpatient Treatment; Individual/Couples/Family/Group Therapy/Health Psychology; [facility]: [facility] [phone]; We will contact you to schedule the appointment or please call with any questions    Return in about 5 days (around 6/13/2020) for Follow up for symptoms recheck.      Phone call duration: 18 minutes    Ayush Santos MD

## 2020-06-09 ASSESSMENT — ANXIETY QUESTIONNAIRES: GAD7 TOTAL SCORE: 19

## 2020-06-22 ENCOUNTER — VIRTUAL VISIT (OUTPATIENT)
Dept: PSYCHOLOGY | Facility: CLINIC | Age: 55
End: 2020-06-22
Payer: COMMERCIAL

## 2020-06-22 DIAGNOSIS — F33.1 MAJOR DEPRESSIVE DISORDER, RECURRENT EPISODE, MODERATE (H): ICD-10-CM

## 2020-06-22 DIAGNOSIS — F41.1 GENERALIZED ANXIETY DISORDER: Primary | ICD-10-CM

## 2020-06-22 PROCEDURE — 99207 ZZC NO BILLABLE SERVICE THIS VISIT: CPT | Mod: GT | Performed by: PSYCHOLOGIST

## 2020-06-22 NOTE — PROGRESS NOTES
Progress Note - Initial Session    Client Name:  Pop Eaton III Date: 6/22/20         Service Type: Individual     Session Start Time: 11:00am Session End Time: 11:52am     Session Length: 52 minutes    Session #: 1    Attendees: Client attended alone    Telemedicine Visit: The patient's condition can be safely assessed and treated via synchronous audio and visual telemedicine encounter.      Reason for Telemedicine Visit: Services only offered telehealth    Originating Site (Patient Location): Patient's other : car    Distant Site (Provider Location): Provider Remote Setting    Consent:  The patient/guardian has verbally consented to: the potential risks and benefits of telemedicine (video visit) versus in person care; bill my insurance or make self-payment for services provided; and responsibility for payment of non-covered services.      Mode of Communication:  Video Conference via [employer]    As the provider I attest to compliance with applicable laws and regulations related to telemedicine.     DATA:  Diagnostic Assessment in progress.  Unable to complete documentation at the conclusion of the first session due to time constraints on gathering necessary data for assessment.    Interactive Complexity: No  Crisis: No    Intervention:  CBT: Educated client on focus of CBT (thoughts, feelings, behaviors); helped identify automatic thoughts; empathized, validated, and other rapport building skills; asked clarifying questions.    ASSESSMENT:  Mental Status Assessment:  Appearance:   Appropriate   Eye Contact:   Good   Psychomotor Behavior: Normal   Attitude:   Cooperative  Friendly Pleasant  Orientation:   All  Speech   Rate / Production: Normal/ Responsive   Volume:  Normal   Mood:    Anxious  Depressed   Affect:    Appropriate   Thought Content:  Clear   Thought Form:  Coherent   Insight:    Good  and Fair       Safety Issues and Plan for Safety and Risk Management:   [place] Suicide  Severity Rating Scale (Lifetime/Recent)No flowsheet data found.  Patient denies current fears or concerns for personal safety.  Patient denies current or recent suicidal ideation or behaviors.  Patient denies current or recent homicidal ideation or behaviors.  Patient denies current or recent self injurious behavior or ideation.  Patient denies other safety concerns.  Recommended that patient call 911 or go to the local ED should there be a change in any of these risk factors.  Patient reports there are unknown at this time.     Diagnostic Criteria:  A. Excessive anxiety and worry about a number of events or activities (such as work or school performance).   B. The person finds it difficult to control the worry.   - Restlessness or feeling keyed up or on edge.    - Difficulty concentrating or mind going blank.    - Sleep disturbance (difficulty falling or staying asleep, or restless unsatisfying sleep).   D. The focus of the anxiety and worry is not confined to features of an Axis I disorder.  E. The anxiety, worry, or physical symptoms cause clinically significant distress or impairment in social, occupational, or other important areas of functioning.     - The aformentioned symptoms began 5 year(s) ago and occurs 3 days per week and is experienced as moderate.  A) (to clarify on follow-up) 5 or more symptoms (required for diagnosis)   - Depressed mood. Note: In children and adolescents, can be irritable mood.     - Diminished interest or pleasure in all, or almost all, activities.    - Psychomotor activity agitation.    - Fatigue or loss of energy.   B) The symptoms cause clinically significant distress or impairment in social, occupational, or other important areas of functioning  C) The episode is not attributable to the physiological effects of a substance or to another medical condition  D) The occurence of major depressive episode is not better explained by other thought / psychotic disorders      DSM5  Diagnoses: (Sustained by DSM5 Criteria Listed Above)  Diagnoses: 296.32 (F33.1) Major Depressive Disorder, Recurrent Episode, Moderate _ and With mixed features  300.02 (F41.1) Generalized Anxiety Disorder (diagnoses Provisional)  Psychosocial & Contextual Factors:   WHODAS 2.0 (12 item):   WHODAS 2.0 Total Score 6/22/2020   Total Score 20       Collateral Reports Completed:  Not Applicable      PLAN: (Homework, other):  Patient stated that he will follow up for ongoing services with [facility] on Monday, June 29, 2020 at 3:00pm.  He will work on specifying goals in the coming week, as well as, track antecedents to his anxiety and depression.      Be Syed  June 22, 2020

## 2020-07-13 ENCOUNTER — OFFICE VISIT (OUTPATIENT)
Dept: FAMILY MEDICINE | Facility: CLINIC | Age: 55
End: 2020-07-13
Payer: COMMERCIAL

## 2020-07-13 VITALS
TEMPERATURE: 98.6 F | WEIGHT: 175 LBS | SYSTOLIC BLOOD PRESSURE: 128 MMHG | BODY MASS INDEX: 26.61 KG/M2 | DIASTOLIC BLOOD PRESSURE: 76 MMHG | OXYGEN SATURATION: 98 % | HEART RATE: 75 BPM

## 2020-07-13 DIAGNOSIS — F41.1 GENERALIZED ANXIETY DISORDER: ICD-10-CM

## 2020-07-13 DIAGNOSIS — F41.1 GAD (GENERALIZED ANXIETY DISORDER): Primary | ICD-10-CM

## 2020-07-13 PROCEDURE — 99214 OFFICE O/P EST MOD 30 MIN: CPT | Performed by: FAMILY MEDICINE

## 2020-07-13 RX ORDER — ALPRAZOLAM 0.5 MG
0.5 TABLET ORAL 2 TIMES DAILY PRN
Qty: 20 TABLET | Refills: 0 | Status: SHIPPED | OUTPATIENT
Start: 2020-07-13 | End: 2020-09-10

## 2020-07-13 RX ORDER — ALPRAZOLAM 0.5 MG
TABLET ORAL
COMMUNITY
Start: 2020-06-01 | End: 2020-07-13

## 2020-07-13 RX ORDER — SERTRALINE HYDROCHLORIDE 100 MG/1
200 TABLET, FILM COATED ORAL DAILY
Qty: 60 TABLET | Refills: 1 | Status: SHIPPED | OUTPATIENT
Start: 2020-07-13 | End: 2021-06-21

## 2020-07-13 RX ORDER — PROPRANOLOL HYDROCHLORIDE 20 MG/1
TABLET ORAL
COMMUNITY
Start: 2020-06-08 | End: 2020-07-13

## 2020-07-13 ASSESSMENT — ANXIETY QUESTIONNAIRES
5. BEING SO RESTLESS THAT IT IS HARD TO SIT STILL: MORE THAN HALF THE DAYS
7. FEELING AFRAID AS IF SOMETHING AWFUL MIGHT HAPPEN: MORE THAN HALF THE DAYS
3. WORRYING TOO MUCH ABOUT DIFFERENT THINGS: NEARLY EVERY DAY
IF YOU CHECKED OFF ANY PROBLEMS ON THIS QUESTIONNAIRE, HOW DIFFICULT HAVE THESE PROBLEMS MADE IT FOR YOU TO DO YOUR WORK, TAKE CARE OF THINGS AT HOME, OR GET ALONG WITH OTHER PEOPLE: VERY DIFFICULT
6. BECOMING EASILY ANNOYED OR IRRITABLE: NEARLY EVERY DAY
GAD7 TOTAL SCORE: 17
1. FEELING NERVOUS, ANXIOUS, OR ON EDGE: MORE THAN HALF THE DAYS
2. NOT BEING ABLE TO STOP OR CONTROL WORRYING: NEARLY EVERY DAY

## 2020-07-13 ASSESSMENT — PATIENT HEALTH QUESTIONNAIRE - PHQ9
5. POOR APPETITE OR OVEREATING: MORE THAN HALF THE DAYS
SUM OF ALL RESPONSES TO PHQ QUESTIONS 1-9: 20

## 2020-07-13 NOTE — PROGRESS NOTES
Subjective     Pop Eaton III is a 54 year old male who presents to clinic today for the following health issues:    HPI   Depression and Anxiety Follow-Up   Things are going well, he likes his job and his anxiety is fair but does have times when flares up and calms himself or uses xanax which does help. Feels like zoloft is helping but not adequately..  He has a therapist he would like to see: [facility]    How are you doing with your depression since your last visit? improving    How are you doing with your anxiety since your last visit?  Worsened     Are you having other symptoms that might be associated with depression or anxiety? No    Have you had a significant life event? No     Do you have any concerns with your use of alcohol or other drugs? No    Social History     Tobacco Use     Smoking status: Light Tobacco Smoker     Packs/day: 0.04     Types: Cigarettes     Smokeless tobacco: Never Used   Substance Use Topics     Alcohol use: Yes     Alcohol/week: 0.0 standard drinks     Comment: 1-2 drinks per month      Drug use: No     PHQ 6/8/2020 6/22/2020 7/13/2020   PHQ-9 Total Score 17 20 20   Q9: Thoughts of better off dead/self-harm past 2 weeks Not at all Not at all Not at all     COMPA-7 SCORE 6/8/2020 6/22/2020 7/13/2020   Total Score - - -   Total Score 19 15 17     Last PHQ-9 7/13/2020   1.  Little interest or pleasure in doing things 3   2.  Feeling down, depressed, or hopeless 3   3.  Trouble falling or staying asleep, or sleeping too much 2   4.  Feeling tired or having little energy 2   5.  Poor appetite or overeating 3   6.  Feeling bad about yourself 3   7.  Trouble concentrating 2   8.  Moving slowly or restless 2   Q9: Thoughts of better off dead/self-harm past 2 weeks 0   PHQ-9 Total Score 20   Difficulty at work, home, or with people Very difficult     COMPA-7  7/13/2020   1. Feeling nervous, anxious, or on edge 2   2. Not being able to stop or control  worrying 3   3. Worrying too much about different things 3   4. Trouble relaxing 2   5. Being so restless that it is hard to sit still 2   6. Becoming easily annoyed or irritable 3   7. Feeling afraid, as if something awful might happen 2   COMPA-7 Total Score 17   If you checked any problems, how difficult have they made it for you to do your work, take care of things at home, or get along with other people? Very difficult     In the past two weeks have you had thoughts of suicide or self-harm?  No.    Do you have concerns about your personal safety or the safety of others?   No    Suicide Assessment Five-step Evaluation and Treatment (SAFE-T)    Patient was seen by psychologist: 6/22/2020    DSM5 Diagnoses: (Sustained by DSM5 Criteria Listed Above)  Diagnoses:  296.32 (F33.1) Major Depressive Disorder, Recurrent Episode, Moderate _ and With mixed features  300.02 (F41.1) Generalized Anxiety Disorder (diagnoses Provisional)      How many servings of fruits and vegetables do you eat daily?  2-3    On average, how many sweetened beverages do you drink each day (Examples: soda, juice, sweet tea, etc.  Do NOT count diet or artificially sweetened beverages)?   0    How many days per week do you exercise enough to make your heart beat faster? 4    How many minutes a day do you exercise enough to make your heart beat faster? 60 or more    How many days per week do you miss taking your medication? 0    Review of Systems   HEENT, cardiovascular, pulmonary, gi and gu systems are negative, except as otherwise noted.      Objective    /76   Pulse 75   Temp 98.6  F (37  C) (Oral)   Wt 79.4 kg (175 lb)   SpO2 98%   BMI 26.61 kg/m    Body mass index is 26.61 kg/m .  Physical Exam   GENERAL: a little on the edge, alert and no distress  RESP: lungs clear to auscultation - no rales, rhonchi or wheezes  CV: regular rate and rhythm, no murmur, click or rub, no peripheral edema   NEURO: Normal strength and tone, mentation  intact and speech normal  PSYCH: mentation appears normal, affect normal/bright    Diagnostic Test Results:  Labs reviewed in Epic        Assessment & Plan     Pop was seen today for depression.    Diagnoses and all orders for this visit:    COMPA (generalized anxiety disorder)   I discussed the potential side effects of antianxiety medications the importance of a multi-armed approach towards the treatment of anxiety including regular exercise, adequate sleep and ongoing development of his support network. Will increase dose of Zoloft to 200 mg. Referral to psychologist given.  -     PSYCHOLOGY REFERRAL  -     ALPRAZolam (XANAX) 0.5 MG tablet; Take 1 tablet (0.5 mg) by mouth 2 times daily as needed for anxiety    Generalized anxiety disorder  -     sertraline (ZOLOFT) 100 MG tablet; Take 2 tablets (200 mg) by mouth daily      Tobacco Cessation:   reports that he has been smoking cigarettes. He has been smoking about 0.04 packs per day. He has never used smokeless tobacco.  Tobacco Cessation Action Plan: Information offered: Patient not interested at this time    Return in about 2 weeks (around 7/27/2020) for call with update.  More than 50% of the time spent with patient on counseling / coordinating his care. Total appointment times was 25 minutes.     Ayush Santos MD  [facility]

## 2020-07-14 ASSESSMENT — ANXIETY QUESTIONNAIRES: GAD7 TOTAL SCORE: 17

## 2020-09-10 ENCOUNTER — MYC REFILL (OUTPATIENT)
Dept: FAMILY MEDICINE | Facility: CLINIC | Age: 55
End: 2020-09-10

## 2020-09-10 DIAGNOSIS — F41.1 GAD (GENERALIZED ANXIETY DISORDER): ICD-10-CM

## 2020-09-10 NOTE — TELEPHONE ENCOUNTER
[place] reviewed:  reviewed- no concerns  Last filled-7/13/2020  [place]  [email or web address]  Shirin Mayfield RN

## 2020-09-10 NOTE — TELEPHONE ENCOUNTER
Controlled Substance Refill Request for ALPRAZolam (XANAX) 0.5 MG tablet   Problem List Complete:  Yes   checked in past 3 months?  No, route to RN    Mild major depression (H)   Problem Detail     Noted:  6/8/2020    Priority:  Medium    Relevant Medications     sertraline (ZOLOFT) 100 MG tablet    ALPRAZolam (XANAX) 0.5 MG tablet    Relevant Results       7/30/18     Chemistry    TSH   1.67     Last Encounter with Mild major depression (H)     Visit Information      Provider  Department  Center    6/8/2020  Ayush Santos MD   [facility]  [facility]

## 2020-09-11 RX ORDER — ALPRAZOLAM 0.5 MG
0.5 TABLET ORAL 2 TIMES DAILY PRN
Qty: 15 TABLET | Refills: 0 | Status: SHIPPED | OUTPATIENT
Start: 2020-09-11 | End: 2022-05-11

## 2020-11-08 ENCOUNTER — HEALTH MAINTENANCE LETTER (OUTPATIENT)
Age: 55
End: 2020-11-08

## 2021-01-18 DIAGNOSIS — Z79.899 ENCOUNTER FOR LONG-TERM (CURRENT) USE OF MEDICATIONS: Primary | ICD-10-CM

## 2021-01-18 LAB
ALT SERPL W P-5'-P-CCNC: 31 U/L (ref 0–70)
AST SERPL W P-5'-P-CCNC: 23 U/L (ref 0–45)
CHOLEST SERPL-MCNC: 128 MG/DL
HBA1C MFR BLD: 5.8 % (ref 0–5.6)
HDLC SERPL-MCNC: 39 MG/DL
LDLC SERPL CALC-MCNC: 65 MG/DL
NONHDLC SERPL-MCNC: 89 MG/DL
TRIGL SERPL-MCNC: 118 MG/DL
VALPROATE SERPL-MCNC: 98 MG/L (ref 50–100)

## 2021-01-18 PROCEDURE — 80164 ASSAY DIPROPYLACETIC ACD TOT: CPT | Performed by: PSYCHIATRY & NEUROLOGY

## 2021-01-18 PROCEDURE — 84460 ALANINE AMINO (ALT) (SGPT): CPT | Performed by: PSYCHIATRY & NEUROLOGY

## 2021-01-18 PROCEDURE — 83036 HEMOGLOBIN GLYCOSYLATED A1C: CPT | Performed by: PSYCHIATRY & NEUROLOGY

## 2021-01-18 PROCEDURE — 80061 LIPID PANEL: CPT | Performed by: PSYCHIATRY & NEUROLOGY

## 2021-01-18 PROCEDURE — 36415 COLL VENOUS BLD VENIPUNCTURE: CPT | Performed by: PSYCHIATRY & NEUROLOGY

## 2021-01-18 PROCEDURE — 84450 TRANSFERASE (AST) (SGOT): CPT | Performed by: PSYCHIATRY & NEUROLOGY

## 2021-03-17 NOTE — PROGRESS NOTES
"History of Present Illness - Pop (Chip) Jairon Eaton III is a very pleasant 55 year old male here to see me for the first time due to tinnitus.    He tells me that he has noted tinnitus ever since childhood.  He tells me that even as a young child he was told he had hearing loss.  He had surgery with tubes, as well as \"repair the ear drums\" on both.  But no issues since he reached adulthood.    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  But at this point he does not have a noisy job. He was RIGHT shouldered shooter.    Otherwise no history of chronic ear disease.  No previous ear surgery.  No history of chemo or radiation therapy to the head and neck, and no major head trauma.  No frequent firearm use.  No previous history working in an industrial environment.      Past Medical History -   Patient Active Problem List   Diagnosis     History of colonic polyps, 2/2017     Family history of prostate cancer     Restless legs syndrome     S/P right rotator cuff repair     Acute pain of right shoulder     Status post subacromial decompression     Mild major depression (H)       Current Medications -   Current Outpatient Medications:      ALPRAZolam (XANAX) 0.5 MG tablet, Take 1 tablet (0.5 mg) by mouth 2 times daily as needed for anxiety, Disp: 15 tablet, Rfl: 0     fluticasone (FLONASE) 50 MCG/ACT spray, Spray 1-2 sprays into both nostrils daily (Patient taking differently: Spray 1-2 sprays into both nostrils daily as needed ), Disp: 3 Bottle, Rfl: 11     mometasone (ELOCON) 0.1 % ointment, Apply sparingly to affected area twice daily as needed.  Do not apply to face., Disp: 45 g, Rfl: 3     psyllium (METAMUCIL) 58.6 % POWD, Take one tablespoon daily, Disp: , Rfl:      sertraline (ZOLOFT) 100 MG tablet, Take 2 tablets (200 mg) by mouth daily, Disp: 60 tablet, Rfl: 1    Allergies -   Allergies   Allergen " Reactions     Wellbutrin [Bupropion]        Social History -   Social History     Socioeconomic History     Marital status:      Spouse name: Not on file     Number of children: Not on file     Years of education: Not on file     Highest education level: Not on file   Occupational History     Not on file   Social Needs     Financial resource strain: Not on file     Food insecurity     Worry: Not on file     Inability: Not on file     Transportation needs     Medical: Not on file     Non-medical: Not on file   Tobacco Use     Smoking status: Light Tobacco Smoker     Packs/day: 0.04     Types: Cigarettes     Smokeless tobacco: Never Used   Substance and Sexual Activity     Alcohol use: Yes     Alcohol/week: 0.0 standard drinks     Comment: 1-2 drinks per month      Drug use: No     Sexual activity: Yes     Partners: Female   Lifestyle     Physical activity     Days per week: Not on file     Minutes per session: Not on file     Stress: Not on file   Relationships     Social connections     Talks on phone: Not on file     Gets together: Not on file     Attends Adventism service: Not on file     Active member of club or organization: Not on file     Attends meetings of clubs or organizations: Not on file     Relationship status: Not on file     Intimate partner violence     Fear of current or ex partner: Not on file     Emotionally abused: Not on file     Physically abused: Not on file     Forced sexual activity: Not on file   Other Topics Concern     Parent/sibling w/ CABG, MI or angioplasty before 65F 55M? Not Asked   Social History Narrative     Not on file       Family History -   Family History   Problem Relation Age of Onset     Prostate Cancer Maternal Uncle         X2       Review of Systems - As per HPI and PMHx, otherwise 10+ system review of the head and neck, and general constitution is negative.    Physical Exam  /77   Pulse 70   Resp 14   SpO2 94%     General - The patient is well nourished  and well developed, and appears to have good nutritional status.  Alert and oriented to person and place, answers questions and cooperates with examination appropriately.   Head and Face - Normocephalic and atraumatic, with no gross asymmetry noted of the contour of the facial features.  The facial nerve is intact, with strong symmetric movements.  Voice and Breathing - The patient was breathing comfortably without the use of accessory muscles. There was no wheezing, stridor, or stertor.  The patients voice was clear and strong, and had appropriate pitch and quality.  Ears - The tympanic membranes are normal in appearance, bony landmarks are intact.  No retraction, perforation, or masses.  No fluid or purulence was seen in the external canal or the middle ear. No evidence of infection of the middle ear or external canal, cerumen was normal in appearance.  Eyes - Extraocular movements intact, and the pupils were reactive to light.  Sclera were not icteric or injected, conjunctiva were pink and moist.  Mouth - Examination of the oral cavity showed pink, healthy oral mucosa. No lesions or ulcerations noted.  The tongue was mobile and midline, and the dentition were in good condition.    Throat - The walls of the oropharynx were smooth, pink, moist, symmetric, and had no lesions or ulcerations.  The tonsillar pillars and soft palate were symmetric.  The uvula was midline on elevation.        Audiologic Studies - An audiogram and tympanogram were performed today as part of the evaluation and personally reviewed. The tympanogram shows a normal Type A curve, with normal canal volume and middle ear pressure.  There is no sign of eustachian tube dysfunction or middle ear effusion.  The audiogram shows a steep sensorineural hearing loss above 2000Hz, There is a 10-20dB threshold shift worse in the LEFT ear. NO conductive hearing loss.      A/P - Pop Eaton III is a 55 year old male  (H90.5) SNHL (sensory-neural  hearing loss), asymmetrical  (primary encounter diagnosis)  (H93.13) Tinnitus, bilateral    Although he has had ear disease as a child, based on today's exam and the lack of any conductive hearing loss on audiogram, I think it is more likely than not that his sensorineural hearing loss is due to noise exposure in the .    We spent the remainder of today's visit discussing the current leading theory on tinnitus, in that it originates from the central nervous system itself, similar to Phantom Limb Syndrome.  Also discussed were steps that can be taken to mask the noise, such as a low volume de-tuned radio, a fan in the background, and hearing aids.  Correlation with stress, anxiety, depression, and high blood pressure were also discussed.  The patient was also cautioned on the numerous expensive non-pharmaceutical options that are advertised, and have no proven benefit.    The patient will follow up as necessary for worsening symptoms, changes in symptoms, or signs of infection.  I have also recommended yearly audiograms, and consideration of a hearing aid evaluation.

## 2021-03-22 ENCOUNTER — OFFICE VISIT (OUTPATIENT)
Dept: OTOLARYNGOLOGY | Facility: CLINIC | Age: 56
End: 2021-03-22
Payer: COMMERCIAL

## 2021-03-22 ENCOUNTER — OFFICE VISIT (OUTPATIENT)
Dept: AUDIOLOGY | Facility: CLINIC | Age: 56
End: 2021-03-22
Payer: COMMERCIAL

## 2021-03-22 VITALS
HEART RATE: 70 BPM | RESPIRATION RATE: 14 BRPM | OXYGEN SATURATION: 94 % | DIASTOLIC BLOOD PRESSURE: 77 MMHG | SYSTOLIC BLOOD PRESSURE: 111 MMHG

## 2021-03-22 DIAGNOSIS — H93.13 TINNITUS, BILATERAL: ICD-10-CM

## 2021-03-22 DIAGNOSIS — H90.3 SENSORINEURAL HEARING LOSS, BILATERAL: Primary | ICD-10-CM

## 2021-03-22 DIAGNOSIS — H90.3 SNHL (SENSORY-NEURAL HEARING LOSS), ASYMMETRICAL: Primary | ICD-10-CM

## 2021-03-22 PROCEDURE — 92557 COMPREHENSIVE HEARING TEST: CPT | Performed by: AUDIOLOGIST

## 2021-03-22 PROCEDURE — 99203 OFFICE O/P NEW LOW 30 MIN: CPT | Performed by: OTOLARYNGOLOGY

## 2021-03-22 PROCEDURE — 92550 TYMPANOMETRY & REFLEX THRESH: CPT | Performed by: AUDIOLOGIST

## 2021-03-22 PROCEDURE — 99207 PR NO CHARGE LOS: CPT | Performed by: AUDIOLOGIST

## 2021-03-22 ASSESSMENT — PAIN SCALES - GENERAL: PAINLEVEL: NO PAIN (0)

## 2021-03-22 NOTE — LETTER
"    3/22/2021         RE: Pop Eaton III  [address] [facility] [address]        Dear Colleague,    Thank you for referring your patient, Pop Eaton III, to the [facility]. Please see a copy of my visit note below.    History of Present Illness - Pop Liz) Jairon Eaton III is a very pleasant 55 year old male here to see me for the first time due to tinnitus.    He tells me that he has noted tinnitus ever since childhood.  He tells me that even as a young child he was told he had hearing loss.  He had surgery with tubes, as well as \"repair the ear drums\" on both.  But no issues since he reached adulthood.    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  But at this point he does not have a noisy job. He was RIGHT shouldered shooter.    Otherwise no history of chronic ear disease.  No previous ear surgery.  No history of chemo or radiation therapy to the head and neck, and no major head trauma.  No frequent firearm use.  No previous history working in an industrial environment.      Past Medical History -   Patient Active Problem List   Diagnosis     History of colonic polyps, 2/2017     Family history of prostate cancer     Restless legs syndrome     S/P right rotator cuff repair     Acute pain of right shoulder     Status post subacromial decompression     Mild major depression (H)       Current Medications -   Current Outpatient Medications:      ALPRAZolam (XANAX) 0.5 MG tablet, Take 1 tablet (0.5 mg) by mouth 2 times daily as needed for anxiety, Disp: 15 tablet, Rfl: 0     fluticasone (FLONASE) 50 MCG/ACT spray, Spray 1-2 sprays into both nostrils daily (Patient taking differently: Spray 1-2 sprays into both nostrils daily as needed ), Disp: 3 Bottle, Rfl: 11     mometasone (ELOCON) 0.1 % ointment, Apply sparingly to affected area twice daily as " 154.94 needed.  Do not apply to face., Disp: 45 g, Rfl: 3     psyllium (METAMUCIL) 58.6 % POWD, Take one tablespoon daily, Disp: , Rfl:      sertraline (ZOLOFT) 100 MG tablet, Take 2 tablets (200 mg) by mouth daily, Disp: 60 tablet, Rfl: 1    Allergies -   Allergies   Allergen Reactions     Wellbutrin [Bupropion]        Social History -   Social History     Socioeconomic History     Marital status:      Spouse name: Not on file     Number of children: Not on file     Years of education: Not on file     Highest education level: Not on file   Occupational History     Not on file   Social Needs     Financial resource strain: Not on file     Food insecurity     Worry: Not on file     Inability: Not on file     Transportation needs     Medical: Not on file     Non-medical: Not on file   Tobacco Use     Smoking status: Light Tobacco Smoker     Packs/day: 0.04     Types: Cigarettes     Smokeless tobacco: Never Used   Substance and Sexual Activity     Alcohol use: Yes     Alcohol/week: 0.0 standard drinks     Comment: 1-2 drinks per month      Drug use: No     Sexual activity: Yes     Partners: Female   Lifestyle     Physical activity     Days per week: Not on file     Minutes per session: Not on file     Stress: Not on file   Relationships     Social connections     Talks on phone: Not on file     Gets together: Not on file     Attends Jainism service: Not on file     Active member of club or organization: Not on file     Attends meetings of clubs or organizations: Not on file     Relationship status: Not on file     Intimate partner violence     Fear of current or ex partner: Not on file     Emotionally abused: Not on file     Physically abused: Not on file     Forced sexual activity: Not on file   Other Topics Concern     Parent/sibling w/ CABG, MI or angioplasty before 65F 55M? Not Asked   Social History Narrative     Not on file       Family History -   Family History   Problem Relation Age of Onset     Prostate  Cancer Maternal Uncle         X2       Review of Systems - As per HPI and PMHx, otherwise 10+ system review of the head and neck, and general constitution is negative.    Physical Exam  /77   Pulse 70   Resp 14   SpO2 94%     General - The patient is well nourished and well developed, and appears to have good nutritional status.  Alert and oriented to person and place, answers questions and cooperates with examination appropriately.   Head and Face - Normocephalic and atraumatic, with no gross asymmetry noted of the contour of the facial features.  The facial nerve is intact, with strong symmetric movements.  Voice and Breathing - The patient was breathing comfortably without the use of accessory muscles. There was no wheezing, stridor, or stertor.  The patients voice was clear and strong, and had appropriate pitch and quality.  Ears - The tympanic membranes are normal in appearance, bony landmarks are intact.  No retraction, perforation, or masses.  No fluid or purulence was seen in the external canal or the middle ear. No evidence of infection of the middle ear or external canal, cerumen was normal in appearance.  Eyes - Extraocular movements intact, and the pupils were reactive to light.  Sclera were not icteric or injected, conjunctiva were pink and moist.  Mouth - Examination of the oral cavity showed pink, healthy oral mucosa. No lesions or ulcerations noted.  The tongue was mobile and midline, and the dentition were in good condition.    Throat - The walls of the oropharynx were smooth, pink, moist, symmetric, and had no lesions or ulcerations.  The tonsillar pillars and soft palate were symmetric.  The uvula was midline on elevation.        Audiologic Studies - An audiogram and tympanogram were performed today as part of the evaluation and personally reviewed. The tympanogram shows a normal Type A curve, with normal canal volume and middle ear pressure.  There is no sign of eustachian tube  dysfunction or middle ear effusion.  The audiogram shows a steep sensorineural hearing loss above 2000Hz, There is a 10-20dB threshold shift worse in the LEFT ear. NO conductive hearing loss.      A/P - Pop Eaton III is a 55 year old male  (H90.5) SNHL (sensory-neural hearing loss), asymmetrical  (primary encounter diagnosis)  (H93.13) Tinnitus, bilateral    Although he has had ear disease as a child, based on today's exam and the lack of any conductive hearing loss on audiogram, I think it is more likely than not that his sensorineural hearing loss is due to noise exposure in the .    We spent the remainder of today's visit discussing the current leading theory on tinnitus, in that it originates from the central nervous system itself, similar to Phantom Limb Syndrome.  Also discussed were steps that can be taken to mask the noise, such as a low volume de-tuned radio, a fan in the background, and hearing aids.  Correlation with stress, anxiety, depression, and high blood pressure were also discussed.  The patient was also cautioned on the numerous expensive non-pharmaceutical options that are advertised, and have no proven benefit.    The patient will follow up as necessary for worsening symptoms, changes in symptoms, or signs of infection.  I have also recommended yearly audiograms, and consideration of a hearing aid evaluation.          Again, thank you for allowing me to participate in the care of your patient.        Sincerely,        Lorne Boucher MD

## 2021-03-22 NOTE — PROGRESS NOTES
AUDIOLOGY REPORT:    Patient was referred from ENT by Cruzito Boucher MD for audiology evaluation. patient reports bilateral tinnitus  for several years and occasional otalgia on the left side only.  He denies noise exposure but feels there might be some family history of hearing loss.    Testing:    Otoscopy:   Otoscopic exam indicates ears are clear of cerumen bilaterally     Tympanograms:    RIGHT: normal eardrum mobility     LEFT:   normal eardrum mobility    Reflexes (reported by stimulus ear):  RIGHT: Ipsilateral is present at normal levels  RIGHT: Contralateral is present at normal levels  LEFT:   Ipsilateral is present at normal levels  LEFT:   Contralateral is present at normal levels    Thresholds:   Pure Tone Thresholds assessed using conventional audiometry with good reliability from 250-8000 Hz bilaterally using insert earphones and circumaural headphones      RIGHT:  normal from 250-2000 Hz sloping to mild-moderate sensorineural hearing loss from 0194-2269 Hz    LEFT:    normal to mild from 250-3000 Hz sloping to severe sensorineural hearing loss from 0735-2261 Hz    Speech Reception Threshold:    RIGHT: 25 dB HL    LEFT:   25 dB HL  Speech Reception Thresholds are in good agreement with pure tone thresholds.    Word Recognition Score:     RIGHT: 96% at 65 dB HL using NU-6 recorded word list.    LEFT:   92% at 65 dB HL using NU-6 recorded word list.    Discussed results with the patient. I recommended the followin) Use of hearing protection when around loud noise  2) Repeat hearing test in 2 years, or sooner if symptoms increase  3) Hearing aid consultation.  I gave him the [employer] informational packet on hearing aids.    Patient was returned to ENT for follow up.     Jerod Bo MA, CCC-A  Licensed Audiologist #[id number]  3/22/2021

## 2021-03-22 NOTE — PATIENT INSTRUCTIONS
Scheduling Information  To schedule your CT/MRI scan, please contact [facility] at [phone] OR [facility] at [phone]    To schedule your Surgery, please contact our Specialty Schedulers at [phone]      ENT Clinic Locations Clinic Hours Telephone Number     [facility]  [address] [place] [address]   Monday:           1:00pm -- 5:00pm    Friday:              8:00am - 12:00pm   To schedule/reschedule an appointment with   Dr. Boucher,   please contact our   Specialty Scheduling Department at:     [phone]       [facility]  [address] [place] [address] Tuesday:          8:00am -- 2:00pm         Urgent Care Locations Clinic Hours Telephone Numbers     [facility]  [address] [place] [address]     Monday-Friday:     11:00am - 9:00pm    Saturday-Sunday:  9:00am - 5:00pm   [phone]     [facility]  [address] [place] [address]     Monday-Friday:      5:00pm - 9:00pm     Saturday-Sunday:  9:00am - 5:00pm   [phone]

## 2021-03-29 ENCOUNTER — THERAPY VISIT (OUTPATIENT)
Dept: PHYSICAL THERAPY | Facility: CLINIC | Age: 56
End: 2021-03-29
Payer: COMMERCIAL

## 2021-03-29 DIAGNOSIS — G89.29 CHRONIC LEFT SHOULDER PAIN: ICD-10-CM

## 2021-03-29 DIAGNOSIS — M25.512 CHRONIC LEFT SHOULDER PAIN: ICD-10-CM

## 2021-03-29 PROBLEM — Z98.890 STATUS POST SUBACROMIAL DECOMPRESSION: Status: RESOLVED | Noted: 2019-12-20 | Resolved: 2021-03-29

## 2021-03-29 PROBLEM — M25.511 ACUTE PAIN OF RIGHT SHOULDER: Status: RESOLVED | Noted: 2019-12-20 | Resolved: 2021-03-29

## 2021-03-29 PROBLEM — Z98.890 S/P RIGHT ROTATOR CUFF REPAIR: Status: RESOLVED | Noted: 2019-12-20 | Resolved: 2021-03-29

## 2021-03-29 PROCEDURE — 97161 PT EVAL LOW COMPLEX 20 MIN: CPT | Mod: GP | Performed by: PHYSICAL THERAPIST

## 2021-03-29 PROCEDURE — 97110 THERAPEUTIC EXERCISES: CPT | Mod: GP | Performed by: PHYSICAL THERAPIST

## 2021-03-29 NOTE — LETTER
[facility]  [address]  [place] [address]  [phone]    2021    Re: Pop Eaton III   :   1965  MRN:  [id number]   REFERRING PHYSICIAN:   William MALIN [facility]    Date of Initial Evaluation:  3/29/21  Visits:  Rxs Used: 1  Reason for Referral:  Chronic left shoulder pain    [facility] Physical Therapy Initial Evaluation  3/29/2021     Precautions/Restrictions/MD instructions: Evaluate and Treat for L shoulder pain    Therapist Assessment: Pop Eaton III is a 55 year old male patient presenting to Physical Therapy with L shoulder pain. Patient demonstrates decreased L shoulder AROM especially flexion, abduction, and IR, as well as pain with palpation over L biceps tendon. Signs and symptoms are consistent with likely L biceps tendinitis and possible adhesive capsulitis. These impairments limit their ability to reach overhead and behind body to perform activities of daily living. Skilled PT services are necessary in order to reduce impairments and improve independent function.    Subjective History    Injury/Condition Details:  Presenting Complaint L shoulder pain   Onset Timing/Date Several months; MD referral from 3/9/2021   Mechanism No specific mechanism of onset.     Symptom Behavior Details    Primary Symptoms Constant symptoms; worsen with activity, pain (Location: L anterior shoulder / biceps tendon, Quality: Sharp and Aching/Throbbing), stiffness, catching/locking   Worst Pain 8/10   Symptom Provocators Overhead reaching, laying on L side, lifting/carrying heavy items   Best Pain 3/10    Symptom Relievers Avoiding provocative positions   Time of day dependent? No   Recent symptom change? 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[employer]    Usual physical activities  (within past year) Driving, lifting/carrying, prolonged standing, pushing/pulling   Orthopaedic History  Previous R shoulder RCR and biceps tenodesis; previous cervical fusions   Medication  Sleep medication   Notable medical history See Epic Chart; smoking, sleep disorder/apnea   Patient goals Decrease pain   Patient Reported Health good     Red Flags: (Bold when present) - reviewed the following and denies  Malaise, unexplained weight loss, night pain, fever    OBJECTIVE    Observation/Posture: rounded shoulders, forward head    Cervical Screen: no notable deficits    Shoulder: * if reproductive of patient's primary complaint   PROM L PROM R AROM L AROM R MMT L MMT R   Flex 135  105* 150 4+ 4+   Abd 110  102* 135 5 5   IR   45 30 5 5   ER   50* 85 4+ 4+   Ext/IR   Belt* T10     Lower trap         Middle trap           Scapulothoraic Rhythm: medial and inferior border protrusion of L scapula at rest    Special tests:   L R   Impingement     Neer's +    Hawkin's-Cody +    Cross-over test +    Painful Arc of Motion +    Internal impingement test     Pain with resisted ER -    Pull Test     Labral     Alpha's +    Crank     Instability     Apprehension (anterior)     Relocation (anterior)     Anterior Load & Shift     Posterior Load & Shift     Multi-Directional Instability      Sulcus     Biceps      Speed's +    Rotator Cuff Tear     Drop Arm -    Belly Press     Lift off        Palpation: Notably TTP of L biceps tendon     ASSESSMENT/PLAN  Patient is a 55 year old male with left sided shoulder complaints.    Patient has the following significant findings with corresponding treatment plan.                Diagnosis 1:  L shoulder pain; signs and symptoms consistent with likely L biceps tendinitis and possible adhesive capsulitis    Pain -  hot/cold therapy, US,  electric stimulation, manual therapy, splint/taping/bracing/orthotics, self management, education and home program  Decreased ROM/flexibility - manual therapy, therapeutic exercise and home program  Decreased strength - therapeutic exercise, therapeutic activities and home program  Decreased function - therapeutic activities and home program  Impaired posture - neuro re-education and home program    Therapy Evaluation Codes:   1) History comprised of:   Personal factors that impact the plan of care:      Past/current experiences and Profession.    Comorbidity factors that impact the plan of care are:      Sleep disorder/apnea and Smoking.     Medications impacting care: Sleep.  2) Examination of Body Systems comprised of:   Body structures and functions that impact the plan of care:      Shoulder.   Activity limitations that impact the plan of care are:      Bathing, Dressing, Lifting, Working, Sleeping, Laying down and reaching (overhead and behind back).  3) Clinical presentation characteristics are:   Evolving/Changing.  4) Decision-Making    Low complexity using standardized patient assessment instrument and/or measureable assessment of functional outcome.    Cumulative Therapy Evaluation is: Low complexity.  Previous and current functional limitations:  (See Goal Flow Sheet for this information)    Short term and Long term goals: (See Goal Flow Sheet for this information)   Communication ability:  Patient appears to be able to clearly communicate and understand verbal and written communication and follow directions correctly.  Treatment Explanation - The following has been discussed with the patient:   RX ordered/plan of care  Anticipated outcomes  Possible risks and side effects  This patient would benefit from PT intervention to resume normal activities.   Rehab potential is good.    Frequency:  1 X week, once daily  Duration:  for 4 weeks; tapering to 2x/month for 2 months  Discharge Plan:  Achieve all  LTG.  Independent in home treatment program.  Reach maximal therapeutic benefit.    Thank you for your referral.    INQUIRIES  Therapist: Be Vigil DPT  [facility] [place] [facility]  [address]  [place] [address]  Phone: [phone]  Fax: [phone]

## 2021-03-29 NOTE — PROGRESS NOTES
[facility] Physical Therapy Initial Evaluation  3/29/2021     Precautions/Restrictions/MD instructions: Evaluate and Treat for L shoulder pain    Therapist Assessment: Pop Eaton III is a 55 year old male patient presenting to Physical Therapy with L shoulder pain. Patient demonstrates decreased L shoulder AROM especially flexion, abduction, and IR, as well as pain with palpation over L biceps tendon. Signs and symptoms are consistent with likely L biceps tendinitis and possible adhesive capsulitis. These impairments limit their ability to reach overhead and behind body to perform activities of daily living. Skilled PT services are necessary in order to reduce impairments and improve independent function.    Subjective History    Injury/Condition Details:  Presenting Complaint L shoulder pain   Onset Timing/Date Several months; MD referral from 3/9/2021   Mechanism No specific mechanism of onset.     Symptom Behavior Details    Primary Symptoms Constant symptoms; worsen with activity, pain (Location: L anterior shoulder / biceps tendon, Quality: Sharp and Aching/Throbbing), stiffness, catching/locking   Worst Pain 8/10   Symptom Provocators Overhead reaching, laying on L side, lifting/carrying heavy items   Best Pain 3/10    Symptom Relievers Avoiding provocative positions   Time of day dependent? No   Recent symptom change? 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[employer]    Usual physical activities  (within past year) Driving, lifting/carrying, prolonged standing, pushing/pulling   Orthopaedic History  Previous R shoulder RCR and biceps tenodesis; previous cervical fusions   Medication  Sleep medication   Notable medical history See Epic Chart; smoking,  sleep disorder/apnea   Patient goals Decrease pain   Patient Reported Health good     Red Flags: (Bold when present) - reviewed the following and denies  Malaise, unexplained weight loss, night pain, fever    OBJECTIVE    Observation/Posture: rounded shoulders, forward head    Cervical Screen: no notable deficits    Shoulder: * if reproductive of patient's primary complaint   PROM L PROM R AROM L AROM R MMT L MMT R   Flex 135  105* 150 4+ 4+   Abd 110  102* 135 5 5   IR   45 30 5 5   ER   50* 85 4+ 4+   Ext/IR   Belt* T10     Lower trap         Middle trap           Scapulothoraic Rhythm: medial and inferior border protrusion of L scapula at rest    Special tests:   L R   Impingement     Neer's +    Hawkin's-Cody +    Cross-over test +    Painful Arc of Motion +    Internal impingement test     Pain with resisted ER -    Pull Test     Labral     Saco's +    Crank     Instability     Apprehension (anterior)     Relocation (anterior)     Anterior Load & Shift     Posterior Load & Shift     Multi-Directional Instability      Sulcus     Biceps      Speed's +    Rotator Cuff Tear     Drop Arm -    Belly Press     Lift off        Palpation: Notably TTP of L biceps tendon     ASSESSMENT/PLAN  Patient is a 55 year old male with left sided shoulder complaints.    Patient has the following significant findings with corresponding treatment plan.                Diagnosis 1:  L shoulder pain; signs and symptoms consistent with likely L biceps tendinitis and possible adhesive capsulitis    Pain -  hot/cold therapy, US, electric stimulation, manual therapy, splint/taping/bracing/orthotics, self management, education and home program  Decreased ROM/flexibility - manual therapy, therapeutic exercise and home program  Decreased strength - therapeutic exercise, therapeutic activities and home program  Decreased function - therapeutic activities and home program  Impaired posture - neuro re-education and home program    Therapy  Evaluation Codes:   1) History comprised of:   Personal factors that impact the plan of care:      Past/current experiences and Profession.    Comorbidity factors that impact the plan of care are:      Sleep disorder/apnea and Smoking.     Medications impacting care: Sleep.  2) Examination of Body Systems comprised of:   Body structures and functions that impact the plan of care:      Shoulder.   Activity limitations that impact the plan of care are:      Bathing, Dressing, Lifting, Working, Sleeping, Laying down and reaching (overhead and behind back).  3) Clinical presentation characteristics are:   Evolving/Changing.  4) Decision-Making    Low complexity using standardized patient assessment instrument and/or measureable assessment of functional outcome.  Cumulative Therapy Evaluation is: Low complexity.    Previous and current functional limitations:  (See Goal Flow Sheet for this information)    Short term and Long term goals: (See Goal Flow Sheet for this information)     Communication ability:  Patient appears to be able to clearly communicate and understand verbal and written communication and follow directions correctly.  Treatment Explanation - The following has been discussed with the patient:   RX ordered/plan of care  Anticipated outcomes  Possible risks and side effects  This patient would benefit from PT intervention to resume normal activities.   Rehab potential is good.    Frequency:  1 X week, once daily  Duration:  for 4 weeks; tapering to 2x/month for 2 months  Discharge Plan:  Achieve all LTG.  Independent in home treatment program.  Reach maximal therapeutic benefit.    Please refer to the daily flowsheet for treatment today, total treatment time and time spent performing 1:1 timed codes.

## 2021-04-05 ENCOUNTER — THERAPY VISIT (OUTPATIENT)
Dept: PHYSICAL THERAPY | Facility: CLINIC | Age: 56
End: 2021-04-05
Payer: COMMERCIAL

## 2021-04-05 DIAGNOSIS — G89.29 CHRONIC LEFT SHOULDER PAIN: ICD-10-CM

## 2021-04-05 DIAGNOSIS — M25.512 CHRONIC LEFT SHOULDER PAIN: ICD-10-CM

## 2021-04-05 PROCEDURE — 97112 NEUROMUSCULAR REEDUCATION: CPT | Mod: GP | Performed by: PHYSICAL THERAPIST

## 2021-04-05 PROCEDURE — 97110 THERAPEUTIC EXERCISES: CPT | Mod: GP | Performed by: PHYSICAL THERAPIST

## 2021-04-19 ENCOUNTER — THERAPY VISIT (OUTPATIENT)
Dept: PHYSICAL THERAPY | Facility: CLINIC | Age: 56
End: 2021-04-19
Payer: COMMERCIAL

## 2021-04-19 DIAGNOSIS — M25.512 CHRONIC LEFT SHOULDER PAIN: ICD-10-CM

## 2021-04-19 DIAGNOSIS — G89.29 CHRONIC LEFT SHOULDER PAIN: ICD-10-CM

## 2021-04-19 PROCEDURE — 97010 HOT OR COLD PACKS THERAPY: CPT | Mod: GP | Performed by: PHYSICAL THERAPIST

## 2021-04-19 PROCEDURE — 97110 THERAPEUTIC EXERCISES: CPT | Mod: GP | Performed by: PHYSICAL THERAPIST

## 2021-04-19 PROCEDURE — 97112 NEUROMUSCULAR REEDUCATION: CPT | Mod: GP | Performed by: PHYSICAL THERAPIST

## 2021-06-11 PROBLEM — G89.29 CHRONIC LEFT SHOULDER PAIN: Status: RESOLVED | Noted: 2021-03-29 | Resolved: 2021-06-11

## 2021-06-11 PROBLEM — M25.512 CHRONIC LEFT SHOULDER PAIN: Status: RESOLVED | Noted: 2021-03-29 | Resolved: 2021-06-11

## 2021-06-11 NOTE — PROGRESS NOTES
Discharge Note    Progress reporting period is from initial evaluation date (please see noted date below) to Apr 19, 2021.  Linked Episodes   Type: Episode: Status: Noted: Resolved: Last update: Updated by:   PHYSICAL THERAPY L shoulder pain - 3/29/2021 Active 3/29/2021  4/19/2021 12:50 PM Grey Vigil PT      Comments:       Pop failed to follow up and current status is unknown.  Please see information below for last relevant information on current status.  Patient seen for 3 visits.    SUBJECTIVE  Subjective changes noted by patient:  Pt reports that his L shoulder is doing okay overall. He has not been as consistent with his HEP due to having to take care of his Father in Law. When he has completed his exercises, he has no complaints.  Current pain level is 3/10.      Changes in function:  Yes (See Goal flowsheet attached for changes in current functional level)  Adverse reaction to treatment or activity: None    OBJECTIVE  Changes noted in objective findings: Pt able to achieve ~90 degress of active L shoulder flexion prior to large increase in pain; in sidelying, pt able to achieve 90 deg of active shoulder flexion without issue.     ASSESSMENT/PLAN  Diagnosis: L shoulder pain   Updated problem list and treatment plan:   Pain - HEP  Decreased ROM/flexibility - HEP  Decreased function - HEP  Decreased strength - HEP    STG/LTGs have been met or progress has been made towards goals:  Yes, please see goal flowsheet for most current information  Assessment of Progress: current status is unknown.    Last current status: Pt is progressing as expected   Self Management Plans:  HEP  I have re-evaluated this patient and find that the nature, scope, duration and intensity of the therapy is appropriate for the medical condition of the patient.  Pop continues to require the following intervention to meet STG and LTG's:  HEP.    Recommendations:  Discharge with current home program.  Patient to follow up with MD as  needed.    Please refer to the daily flowsheet for treatment today, total treatment time and time spent performing 1:1 timed codes.

## 2021-06-21 ENCOUNTER — OFFICE VISIT (OUTPATIENT)
Dept: FAMILY MEDICINE | Facility: CLINIC | Age: 56
End: 2021-06-21
Payer: COMMERCIAL

## 2021-06-21 VITALS
SYSTOLIC BLOOD PRESSURE: 112 MMHG | HEART RATE: 84 BPM | BODY MASS INDEX: 28.43 KG/M2 | WEIGHT: 187 LBS | DIASTOLIC BLOOD PRESSURE: 70 MMHG | OXYGEN SATURATION: 98 % | TEMPERATURE: 98.1 F

## 2021-06-21 DIAGNOSIS — F33.1 MAJOR DEPRESSIVE DISORDER, RECURRENT EPISODE, MODERATE (H): ICD-10-CM

## 2021-06-21 DIAGNOSIS — Z01.818 PREOPERATIVE EXAMINATION: Primary | ICD-10-CM

## 2021-06-21 DIAGNOSIS — F17.210 LIGHT CIGARETTE SMOKER: ICD-10-CM

## 2021-06-21 DIAGNOSIS — M25.512 CHRONIC LEFT SHOULDER PAIN: ICD-10-CM

## 2021-06-21 DIAGNOSIS — G89.29 CHRONIC LEFT SHOULDER PAIN: ICD-10-CM

## 2021-06-21 LAB
ANION GAP SERPL CALCULATED.3IONS-SCNC: 5 MMOL/L (ref 3–14)
BUN SERPL-MCNC: 11 MG/DL (ref 7–30)
CALCIUM SERPL-MCNC: 8.3 MG/DL (ref 8.5–10.1)
CHLORIDE SERPL-SCNC: 109 MMOL/L (ref 94–109)
CO2 SERPL-SCNC: 26 MMOL/L (ref 20–32)
CREAT SERPL-MCNC: 1.1 MG/DL (ref 0.66–1.25)
GFR SERPL CREATININE-BSD FRML MDRD: 75 ML/MIN/{1.73_M2}
GLUCOSE SERPL-MCNC: 74 MG/DL (ref 70–99)
HGB BLD-MCNC: 15.9 G/DL (ref 13.3–17.7)
POTASSIUM SERPL-SCNC: 3.8 MMOL/L (ref 3.4–5.3)
SODIUM SERPL-SCNC: 140 MMOL/L (ref 133–144)

## 2021-06-21 PROCEDURE — 99214 OFFICE O/P EST MOD 30 MIN: CPT | Performed by: FAMILY MEDICINE

## 2021-06-21 PROCEDURE — 85018 HEMOGLOBIN: CPT | Performed by: FAMILY MEDICINE

## 2021-06-21 PROCEDURE — 80048 BASIC METABOLIC PNL TOTAL CA: CPT | Performed by: FAMILY MEDICINE

## 2021-06-21 PROCEDURE — 36415 COLL VENOUS BLD VENIPUNCTURE: CPT | Performed by: FAMILY MEDICINE

## 2021-06-21 RX ORDER — DIVALPROEX SODIUM 500 MG/1
500 TABLET, DELAYED RELEASE ORAL 3 TIMES DAILY
COMMUNITY
End: 2024-03-27

## 2021-06-21 RX ORDER — CITALOPRAM HYDROBROMIDE 20 MG/1
20 TABLET ORAL DAILY
COMMUNITY
End: 2024-03-27

## 2021-06-21 RX ORDER — ARIPIPRAZOLE 30 MG/1
30 TABLET ORAL DAILY
COMMUNITY
End: 2024-03-27

## 2021-06-21 NOTE — PROGRESS NOTES
[facility]  [address] [place] [address]  [place] [address]  Phone: [phone]  Primary Provider: Evan Ríos  Pre-op Performing Provider: EVAN RÍOS    PREOPERATIVE EVALUATION:  Today's date: 6/21/2021    Pop Eaton III is a 55 year old male who presents for a preoperative evaluation.    Surgical Information:  Surgery/Procedure: Left shoulder decompression and biceps  Surgery Location: [facility]  Surgeon: Drew  Surgery Date: 6/24/21  Time of Surgery: tbd  Where patient plans to recover: At home with family     Fax number for surgical facility: Fax [phone]    Type of Anesthesia Anticipated: General    Assessment & Plan     The proposed surgical procedure is considered LOW risk.    Preoperative examination    Shoulder surgery  - Basic metabolic panel  - Hemoglobin    Chronic left shoulder pain    Due for shoulder decompression     Light cigarette smoker   working on quitting    Major depressive disorder, recurrent episode, moderate (H)   -  Following with mental health; stable at this time.    Risks and Recommendations:  The patient has the following additional risks and recommendations for perioperative complications:   - No identified additional risk factors other than previously addressed    Medication Instructions:  Patient is to take all scheduled medications on the day of surgery    RECOMMENDATION:  APPROVAL GIVEN to proceed with proposed procedure, without further diagnostic evaluation.  956}  Subjective     HPI related to upcoming procedure: Chronic Left shoulder pain       Preop Questions 6/21/2021   1. Have you ever had a heart attack or stroke? No   2. Have you ever had surgery on your heart or blood vessels, such as a stent placement, a coronary artery bypass, or surgery on an artery in your head, neck, heart, or legs? No   3. Do you have chest pain with activity? No   4. Do you have a history of  heart failure? No   5. Do you  currently have a cold, bronchitis or symptoms of other infection? No   6. Do you have a cough, shortness of breath, or wheezing? No   7. Do you or anyone in your family have previous history of blood clots? No   8. Do you or does anyone in your family have a serious bleeding problem such as prolonged bleeding following surgeries or cuts? No   9. Have you ever had problems with anemia or been told to take iron pills? No   10. Have you had any abnormal blood loss such as black, tarry or bloody stools? No   11. Have you ever had a blood transfusion? No   12. Are you willing to have a blood transfusion if it is medically needed before, during, or after your surgery? Yes   13. Have you or any of your relatives ever had problems with anesthesia? No   14. Do you have sleep apnea, excessive snoring or daytime drowsiness? No   15. Do you have any artifical heart valves or other implanted medical devices like a pacemaker, defibrillator, or continuous glucose monitor? No   16. Do you have artificial joints? No   17. Are you allergic to latex? No     Health Care Directive:  Patient does not have a Health Care Directive or Living Will: Discussed advance care planning with patient; information given to patient to review.    Preoperative Review of :   reviewed - no record of controlled substances prescribed.    Status of Chronic Conditions:  See problem list for active medical problems. See ROS for pertinent symptoms related to these conditions.    Review of Systems  Constitutional, neuro, ENT, endocrine, pulmonary, cardiac, gastrointestinal, genitourinary, integument and psychiatric systems are negative, except as otherwise noted.    Patient Active Problem List    Diagnosis Date Noted     SNHL (sensory-neural hearing loss), asymmetrical 03/22/2021     Priority: Medium     Tinnitus, bilateral 03/22/2021     Priority: Medium     Mild major depression (H) 06/08/2020     Priority: Medium     Family history of prostate cancer  08/07/2018     Priority: Medium     Restless legs syndrome 08/07/2018     Priority: Medium     History of colonic polyps, 2/2017 02/15/2017     Priority: Medium      No past medical history on file.  Past Surgical History:   Procedure Laterality Date     ARTHROSCOPY KNEE RT/LT Left 2013    meniscal tear      ARTHROSCOPY KNEE RT/LT Right 10/2016    medial meniscal tear      COLONOSCOPY  02/2017    Q 3 years for polyps     COLONOSCOPY WITH CO2 INSUFFLATION N/A 2/15/2017    Procedure: COLONOSCOPY WITH CO2 INSUFFLATION;  Surgeon: Galileo Lezama DO;  Location: [facility] OR     LAPAROSCOPIC APPENDECTOMY  2010     LAPAROSCOPIC CHOLECYSTECTOMY  2010     SURGICAL HISTORY OF -  Left 1995    corneal transplant X 2 on left eye     Current Outpatient Medications   Medication Sig Dispense Refill     ALPRAZolam (XANAX) 0.5 MG tablet Take 1 tablet (0.5 mg) by mouth 2 times daily as needed for anxiety 15 tablet 0     ARIPiprazole (ABILIFY) 30 MG tablet Take 30 mg by mouth daily       cariprazine (VRAYLAR) 1.5 MG CAPS capsule Take by mouth daily       citalopram (CELEXA) 20 MG tablet Take 20 mg by mouth daily       divalproex sodium delayed-release (DEPAKOTE) 500 MG DR tablet Take 500 mg by mouth 3 times daily       fluticasone (FLONASE) 50 MCG/ACT spray Spray 1-2 sprays into both nostrils daily (Patient taking differently: Spray 1-2 sprays into both nostrils daily as needed ) 3 Bottle 11     mometasone (ELOCON) 0.1 % ointment Apply sparingly to affected area twice daily as needed.  Do not apply to face. 45 g 3     psyllium (METAMUCIL) 58.6 % POWD Take one tablespoon daily         Allergies   Allergen Reactions     Wellbutrin [Bupropion]         Social History     Tobacco Use     Smoking status: Light Tobacco Smoker     Packs/day: 0.04     Types: Cigarettes     Smokeless tobacco: Never Used   Substance Use Topics     Alcohol use: Yes     Alcohol/week: 0.0 standard drinks     Comment: 1-2 drinks per month      Family History   Problem  Relation Age of Onset     Prostate Cancer Maternal Uncle         X2     History   Drug Use No         Objective     /70   Pulse 84   Temp 98.1  F (36.7  C) (Oral)   Wt 84.8 kg (187 lb)   SpO2 98%   BMI 28.43 kg/m      Physical Exam    GENERAL APPEARANCE: healthy, alert and no distress     HENT: ear canals and TM's normal and nose and mouth without ulcers or lesions     NECK: no adenopathy and thyroid normal to palpation     RESP: lungs clear to auscultation - no rales, rhonchi or wheezes     CV: regular rates and rhythm, normal S1 S2, no S3 or S4 and no murmur, click or rub     ABDOMEN:  soft, nontender, no masses and bowel sounds normal     SKIN: no suspicious lesions or rashes     NEURO: Normal strength and tone, mentation intact and speech normal     PSYCH: mentation appears normal. and affect normal/bright    Recent Labs   Lab Test 01/18/21  0915 11/14/19  1530   HGB  --  17.5   A1C 5.8*  --       Results for orders placed or performed in visit on 06/21/21   Basic metabolic panel     Status: Abnormal   Result Value Ref Range    Sodium 140 133 - 144 mmol/L    Potassium 3.8 3.4 - 5.3 mmol/L    Chloride 109 94 - 109 mmol/L    Carbon Dioxide 26 20 - 32 mmol/L    Anion Gap 5 3 - 14 mmol/L    Glucose 74 70 - 99 mg/dL    Urea Nitrogen 11 7 - 30 mg/dL    Creatinine 1.10 0.66 - 1.25 mg/dL    GFR Estimate 75 >60 mL/min/[1.73_m2]    GFR Estimate If Black 87 >60 mL/min/[1.73_m2]    Calcium 8.3 (L) 8.5 - 10.1 mg/dL   Hemoglobin     Status: None   Result Value Ref Range    Hemoglobin 15.9 13.3 - 17.7 g/dL     Diagnostics:  Labs pending at this time.  Results will be reviewed when available.   No EKG required, no history of coronary heart disease, significant arrhythmia, peripheral arterial disease or other structural heart disease.    Revised Cardiac Risk Index (RCRI):  The patient has the following serious cardiovascular risks for perioperative complications:   - No serious cardiac risks = 0 points     RCRI  Interpretation: 0 points: Class I (very low risk - 0.4% complication rate)      Signed Electronically by: Ayush Santos MD  Copy of this evaluation report is provided to requesting physician.

## 2021-08-02 ENCOUNTER — THERAPY VISIT (OUTPATIENT)
Dept: PHYSICAL THERAPY | Facility: CLINIC | Age: 56
End: 2021-08-02
Payer: COMMERCIAL

## 2021-08-02 DIAGNOSIS — G89.18 ACUTE POSTOPERATIVE PAIN OF RIGHT SHOULDER: ICD-10-CM

## 2021-08-02 DIAGNOSIS — M25.511 ACUTE POSTOPERATIVE PAIN OF RIGHT SHOULDER: ICD-10-CM

## 2021-08-02 DIAGNOSIS — M25.511 ACUTE PAIN OF RIGHT SHOULDER: Primary | ICD-10-CM

## 2021-08-02 PROCEDURE — 97110 THERAPEUTIC EXERCISES: CPT | Mod: GP | Performed by: PHYSICAL THERAPIST

## 2021-08-02 PROCEDURE — 97161 PT EVAL LOW COMPLEX 20 MIN: CPT | Mod: GP | Performed by: PHYSICAL THERAPIST

## 2021-08-09 ENCOUNTER — THERAPY VISIT (OUTPATIENT)
Dept: PHYSICAL THERAPY | Facility: CLINIC | Age: 56
End: 2021-08-09
Payer: COMMERCIAL

## 2021-08-09 DIAGNOSIS — M25.512 ACUTE PAIN OF LEFT SHOULDER: Primary | ICD-10-CM

## 2021-08-09 PROCEDURE — 97112 NEUROMUSCULAR REEDUCATION: CPT | Mod: GP | Performed by: PHYSICAL THERAPIST

## 2021-08-09 PROCEDURE — 97110 THERAPEUTIC EXERCISES: CPT | Mod: GP | Performed by: PHYSICAL THERAPIST

## 2021-08-09 NOTE — PROGRESS NOTES
"Physical Therapy Initial Evaluation  Subjective:  The history is provided by the patient. No  was used.   Therapist Generated HPI Evaluation  Problem details: \"Chip\" is a pleasant  54 yo male referred to physical therapy under the direction of Dr. Russell (s/p Left shoulder arthroscopic extensive debridement, SAD, and subpectoral biceps tendon transfer by Dr. Russell, DOS 6/24/21).     R hand dominant    Employed as a [employer] : currently not working, will require ability to drive, lift/carry, prolong standing, repetitive tasks and pushing/pulling as part of his job demands. .         Type of problem:  Right shoulder.    This is a new condition.  Condition occurred with:  Repetition/overuse, while carrying an object and lifting.  Where condition occurred: at work and for unknown reasons.  Patient reports pain:  Anterior and in the joint (subpec area).  Pain is described as aching and is intermittent (4/10 ).  Pain radiates to:  Upper arm and shoulder. Pain is the same all the time and worse during the night.  Since onset symptoms are gradually improving.  Associated symptoms:  Loss of motion/stiffness, loss of strength and painful arc. Symptoms are exacerbated by carrying, lifting, lying on extremity, using arm at shoulder level, using arm behind back and using arm overhead  and relieved by rest and ice.  Special tests included:  X-ray and MRI.  Previous treatment includes physical therapy. There was mild improvement following previous treatment.  Restrictions due to condition include:  Currently not working due to present treatment.  Barriers include:  None as reported by patient.                        Objective:  Standing Alignment:    Cervical/Thoracic:  Forward head  Shoulder/UE:  Rounded shoulders  Lumbar:  Lordosis decr            Gait:    Gait Type:  Normal   Weight Bearing Status:  WBAT   Assistive Devices:  None      Flexibility/Screens:   Negative screens: Cervical "           Neurological: He is alert. He has normal strength and normal reflexes. No cranial nerve deficit or sensory deficit.                      Shoulder Evaluation:  ROM:    PROM:    Flexion:  Left:  90    Right: 165      Abduction:  Left:  80    Right:  165    Internal Rotation:  Left:  60    Right:  90  External Rotation:  Left:  10    Right:  70                    Strength:  not assessed                        Special Tests:  not assessed      Palpation:  Palpation assessed shoulder: instructed use of vitamin E for incision care.   Left shoulder tenderness present at:  Incisional                                       General     ROS    Assessment/Plan:    Patient is a 55 year old male with left side shoulder complaints.    Patient has the following significant findings with corresponding treatment plan.                 Diagnosis 1:  (s/p Left shoulder arthroscopic extensive debridement, SAD, and subpectoral biceps tendon transfer by Dr. Russell, DOS 6/24/21)        Therapy Evaluation Codes:   1) History comprised of:   Personal factors that impact the plan of care:      None.    Comorbidity factors that impact the plan of care are:      Depression and Smoking.     Medications impacting care: Anti-depressant.  2) Examination of Body Systems comprised of:   Body structures and functions that impact the plan of care:      Shoulder.   Activity limitations that impact the plan of care are:      Bathing, Driving, Dressing, Lifting, Working, Sleeping and Laying down.  3) Clinical presentation characteristics are:   Stable/Uncomplicated.  4) Decision-Making    Low complexity using standardized patient assessment instrument and/or measureable assessment of functional outcome.  Cumulative Therapy Evaluation is: Low complexity.    Previous and current functional limitations:  (See Goal Flow Sheet for this information)    Short term and Long term goals: (See Goal Flow Sheet for this information)     Communication ability:   Patient appears to be able to clearly communicate and understand verbal and written communication and follow directions correctly.  Treatment Explanation - The following has been discussed with the patient:   RX ordered/plan of care  Anticipated outcomes  Possible risks and side effects  This patient would benefit from PT intervention to resume normal activities.   Rehab potential is good.    Frequency:  1 X week, once daily  Duration:  for 6 weeks tapering to 1 X a week, every other week  over 16 weeks  Discharge Plan:  Independent in home treatment program.  Reach maximal therapeutic benefit.    [facility] with SAD     Please refer to the daily flowsheet for treatment today, total treatment time and time spent performing 1:1 timed codes.

## 2021-08-25 ENCOUNTER — THERAPY VISIT (OUTPATIENT)
Dept: PHYSICAL THERAPY | Facility: CLINIC | Age: 56
End: 2021-08-25
Payer: COMMERCIAL

## 2021-08-25 DIAGNOSIS — M25.511 ACUTE PAIN OF RIGHT SHOULDER: Primary | ICD-10-CM

## 2021-08-25 PROCEDURE — 97110 THERAPEUTIC EXERCISES: CPT | Mod: GP | Performed by: PHYSICAL THERAPIST

## 2021-08-25 PROCEDURE — 97112 NEUROMUSCULAR REEDUCATION: CPT | Mod: GP | Performed by: PHYSICAL THERAPIST

## 2021-08-30 ENCOUNTER — THERAPY VISIT (OUTPATIENT)
Dept: PHYSICAL THERAPY | Facility: CLINIC | Age: 56
End: 2021-08-30
Payer: COMMERCIAL

## 2021-08-30 DIAGNOSIS — M25.512 ACUTE PAIN OF LEFT SHOULDER: Primary | ICD-10-CM

## 2021-08-30 PROCEDURE — 97112 NEUROMUSCULAR REEDUCATION: CPT | Mod: GP | Performed by: PHYSICAL THERAPIST

## 2021-08-30 PROCEDURE — 97110 THERAPEUTIC EXERCISES: CPT | Mod: GP | Performed by: PHYSICAL THERAPIST

## 2021-09-11 ENCOUNTER — HEALTH MAINTENANCE LETTER (OUTPATIENT)
Age: 56
End: 2021-09-11

## 2021-09-13 ENCOUNTER — MEDICAL CORRESPONDENCE (OUTPATIENT)
Dept: HEALTH INFORMATION MANAGEMENT | Facility: CLINIC | Age: 56
End: 2021-09-13

## 2021-09-16 ENCOUNTER — THERAPY VISIT (OUTPATIENT)
Dept: PHYSICAL THERAPY | Facility: CLINIC | Age: 56
End: 2021-09-16
Payer: COMMERCIAL

## 2021-09-16 DIAGNOSIS — G89.18 ACUTE POSTOPERATIVE PAIN OF RIGHT SHOULDER: ICD-10-CM

## 2021-09-16 DIAGNOSIS — M25.511 ACUTE POSTOPERATIVE PAIN OF RIGHT SHOULDER: ICD-10-CM

## 2021-09-16 DIAGNOSIS — M25.512 ACUTE PAIN OF LEFT SHOULDER: Primary | ICD-10-CM

## 2021-09-16 DIAGNOSIS — M25.511 ACUTE PAIN OF RIGHT SHOULDER: ICD-10-CM

## 2021-09-16 PROCEDURE — 97110 THERAPEUTIC EXERCISES: CPT | Mod: GP | Performed by: PHYSICAL THERAPIST

## 2021-09-16 PROCEDURE — 97112 NEUROMUSCULAR REEDUCATION: CPT | Mod: GP | Performed by: PHYSICAL THERAPIST

## 2021-09-21 ENCOUNTER — THERAPY VISIT (OUTPATIENT)
Dept: PHYSICAL THERAPY | Facility: CLINIC | Age: 56
End: 2021-09-21
Payer: COMMERCIAL

## 2021-09-21 DIAGNOSIS — G89.18 ACUTE POSTOPERATIVE PAIN OF RIGHT SHOULDER: Primary | ICD-10-CM

## 2021-09-21 DIAGNOSIS — M25.511 ACUTE POSTOPERATIVE PAIN OF RIGHT SHOULDER: Primary | ICD-10-CM

## 2021-09-21 DIAGNOSIS — M25.512 ACUTE PAIN OF LEFT SHOULDER: ICD-10-CM

## 2021-09-21 PROCEDURE — 97110 THERAPEUTIC EXERCISES: CPT | Mod: GP | Performed by: PHYSICAL THERAPIST

## 2021-09-21 PROCEDURE — 97112 NEUROMUSCULAR REEDUCATION: CPT | Mod: GP | Performed by: PHYSICAL THERAPIST

## 2021-09-27 ENCOUNTER — LAB (OUTPATIENT)
Dept: LAB | Facility: CLINIC | Age: 56
End: 2021-09-27
Payer: COMMERCIAL

## 2021-09-27 DIAGNOSIS — F33.2 MAJOR DEPRESSIVE DISORDER, RECURRENT EPISODE, SEVERE (H): Primary | ICD-10-CM

## 2021-09-27 DIAGNOSIS — F40.11 GENERALIZED SOCIAL PHOBIA: ICD-10-CM

## 2021-09-27 DIAGNOSIS — F41.1 GENERALIZED ANXIETY DISORDER: ICD-10-CM

## 2021-09-27 LAB
ALBUMIN SERPL-MCNC: 4.2 G/DL (ref 3.4–5)
ALP SERPL-CCNC: 89 U/L (ref 40–150)
ALT SERPL W P-5'-P-CCNC: 52 U/L (ref 0–70)
AST SERPL W P-5'-P-CCNC: 31 U/L (ref 0–45)
BILIRUB DIRECT SERPL-MCNC: <0.1 MG/DL (ref 0–0.2)
BILIRUB SERPL-MCNC: 0.3 MG/DL (ref 0.2–1.3)
CHOLEST SERPL-MCNC: 132 MG/DL
FASTING STATUS PATIENT QL REPORTED: YES
HBA1C MFR BLD: 5.5 % (ref 0–5.6)
HDLC SERPL-MCNC: 34 MG/DL
LDLC SERPL CALC-MCNC: 71 MG/DL
NONHDLC SERPL-MCNC: 98 MG/DL
PROT SERPL-MCNC: 7.3 G/DL (ref 6.8–8.8)
TRIGL SERPL-MCNC: 133 MG/DL

## 2021-09-27 PROCEDURE — 80061 LIPID PANEL: CPT

## 2021-09-27 PROCEDURE — 80164 ASSAY DIPROPYLACETIC ACD TOT: CPT

## 2021-09-27 PROCEDURE — 80076 HEPATIC FUNCTION PANEL: CPT

## 2021-09-27 PROCEDURE — 83036 HEMOGLOBIN GLYCOSYLATED A1C: CPT

## 2021-09-27 PROCEDURE — 36415 COLL VENOUS BLD VENIPUNCTURE: CPT

## 2021-09-28 ENCOUNTER — THERAPY VISIT (OUTPATIENT)
Dept: PHYSICAL THERAPY | Facility: CLINIC | Age: 56
End: 2021-09-28
Payer: COMMERCIAL

## 2021-09-28 DIAGNOSIS — G89.18 ACUTE POSTOPERATIVE PAIN OF RIGHT SHOULDER: Primary | ICD-10-CM

## 2021-09-28 DIAGNOSIS — M25.511 ACUTE PAIN OF RIGHT SHOULDER: ICD-10-CM

## 2021-09-28 DIAGNOSIS — M25.511 ACUTE POSTOPERATIVE PAIN OF RIGHT SHOULDER: Primary | ICD-10-CM

## 2021-09-28 LAB — VALPROATE SERPL-MCNC: <3 MG/L

## 2021-09-28 PROCEDURE — 97110 THERAPEUTIC EXERCISES: CPT | Mod: GP | Performed by: PHYSICAL THERAPIST

## 2021-09-28 PROCEDURE — 97112 NEUROMUSCULAR REEDUCATION: CPT | Mod: GP | Performed by: PHYSICAL THERAPIST

## 2021-09-28 NOTE — LETTER
[facility]   [address]  [place] [id number] [phone]    2021    Re: Pop Eaton III   :   1965  MRN:  [id number]   REFERRING PHYSICIAN:   William MALIN [facility]    Date of Initial Evaluation:  21  Visits:  Rxs Used: 7  Reason for Referral:     Acute postoperative pain of right shoulder  Acute pain of right shoulder    PROGRESS  REPORT  Progress reporting period is from 2021 to 2021. 7 visits     SUBJECTIVE  10-11 weeks post op, SAD, Biceps tenodesis   Feeling well.   Gradually improving function and pain reduction.   SPADI improved from 67/100 to 37/100.  Reaching overhead, behind the back and carrying greaterthan 10# or most functional limits    Current Pain level: 1/10   Initial Pain level: 8/10   Changes in function: Yes, see goal flow sheet for change in function   Adverse reactions: None      OBJECTIVE  40 ER , IR 85-90 , active Flexion 130 AROM.   Slight scap hike.     PT intervention: gentle flexion ROM, sleeper stretch. Advanced to scap strength without weight.   Goal of full AROM reach within the month.     ASSESSMENT/PLAN  Updated problem list and treatment plan: Diagnosis 1:  L shoulder post op Biceps Tendodesis    STG/LTGs have been met or progress has been made towards goals:  Yes (See Goal flow sheet completed today.)  Assessment of Progress: The patient's condition is improving.  The patient's condition has potential to improve.  Self Management Plans:  Patient has been instructed in a home treatment program.    Recommendations:  This patient would benefit from continued therapy.     Frequency:  1 X week, to every other week once daily  Duration:  for 8 weeks    Thank you for your referral.    INQUIRIES  [facility]  Therapist:Bal Castillo, PT, ATC, Cert. MDT  [address]  [place] [address]  Phone:  [phone]  Fax: [phone]

## 2021-10-03 NOTE — PROGRESS NOTES
Physical Therapy Initial Evaluation  Subjective:  HPI                    Objective:  System    Physical Exam    General     ROS    Assessment/Plan:    PROGRESS  REPORT    Progress reporting period is from 8/2/2021 to 9/28/2021. 7 visits     SUBJECTIVE   10-11 weeks post op, SAD, Biceps tenodesis      Feeling well.     Gradually improving function and pain reduction.     SPADI improved from 67/100 to 37/100.     Reaching overhead, behind the back and carrying greaterthan 10# or most functional limits      Current Pain level: 1/10   Initial Pain level: 8/10   Changes in function: Yes, see goal flow sheet for change in function   Adverse reactions: None;   ,         OBJECTIVE   40 ER , IR 85-90 , active Flexion 130 AROM.     Slight scap hike.       PT intervention: gentle flexion ROM, sleeper stretch. Advanced to scap strength without weight.     Goal of full AROM reach within the month.     ASSESSMENT/PLAN  Updated problem list and treatment plan: Diagnosis 1:  L shoulder post op Biceps Tendodesis      STG/LTGs have been met or progress has been made towards goals:  Yes (See Goal flow sheet completed today.)  Assessment of Progress: The patient's condition is improving.  The patient's condition has potential to improve.  Self Management Plans:  Patient has been instructed in a home treatment program.        Recommendations:  This patient would benefit from continued therapy.     Frequency:  1 X week, to every other week once daily  Duration:  for 8 weeks        Please refer to the daily flowsheet for treatment today, total treatment time and time spent performing 1:1 timed codes.

## 2021-10-18 ENCOUNTER — THERAPY VISIT (OUTPATIENT)
Dept: PHYSICAL THERAPY | Facility: CLINIC | Age: 56
End: 2021-10-18
Payer: COMMERCIAL

## 2021-10-18 DIAGNOSIS — M25.512 ACUTE PAIN OF LEFT SHOULDER: Primary | ICD-10-CM

## 2021-10-18 PROCEDURE — 97112 NEUROMUSCULAR REEDUCATION: CPT | Mod: GP | Performed by: PHYSICAL THERAPIST

## 2021-10-18 PROCEDURE — 97110 THERAPEUTIC EXERCISES: CPT | Mod: GP | Performed by: PHYSICAL THERAPIST

## 2021-10-19 PROBLEM — F32.9 MAJOR DEPRESSION: Status: ACTIVE | Noted: 2020-06-08

## 2021-10-25 ENCOUNTER — THERAPY VISIT (OUTPATIENT)
Dept: PHYSICAL THERAPY | Facility: CLINIC | Age: 56
End: 2021-10-25
Payer: COMMERCIAL

## 2021-10-25 DIAGNOSIS — M25.511 ACUTE POSTOPERATIVE PAIN OF RIGHT SHOULDER: ICD-10-CM

## 2021-10-25 DIAGNOSIS — G89.18 ACUTE POSTOPERATIVE PAIN OF RIGHT SHOULDER: ICD-10-CM

## 2021-10-25 DIAGNOSIS — M25.512 ACUTE PAIN OF LEFT SHOULDER: Primary | ICD-10-CM

## 2021-10-25 PROCEDURE — 97110 THERAPEUTIC EXERCISES: CPT | Mod: GP | Performed by: PHYSICAL THERAPIST

## 2021-11-01 ENCOUNTER — THERAPY VISIT (OUTPATIENT)
Dept: PHYSICAL THERAPY | Facility: CLINIC | Age: 56
End: 2021-11-01
Payer: COMMERCIAL

## 2021-11-01 DIAGNOSIS — M25.512 ACUTE PAIN OF LEFT SHOULDER: Primary | ICD-10-CM

## 2021-11-01 DIAGNOSIS — G89.18 ACUTE POSTOPERATIVE PAIN OF RIGHT SHOULDER: ICD-10-CM

## 2021-11-01 DIAGNOSIS — M25.511 ACUTE POSTOPERATIVE PAIN OF RIGHT SHOULDER: ICD-10-CM

## 2021-11-01 PROCEDURE — 97110 THERAPEUTIC EXERCISES: CPT | Mod: GP | Performed by: PHYSICAL THERAPIST

## 2021-11-01 PROCEDURE — 97112 NEUROMUSCULAR REEDUCATION: CPT | Mod: GP | Performed by: PHYSICAL THERAPIST

## 2021-11-11 ENCOUNTER — THERAPY VISIT (OUTPATIENT)
Dept: PHYSICAL THERAPY | Facility: CLINIC | Age: 56
End: 2021-11-11
Payer: COMMERCIAL

## 2021-11-11 DIAGNOSIS — M25.512 ACUTE PAIN OF LEFT SHOULDER: Primary | ICD-10-CM

## 2021-11-11 DIAGNOSIS — M25.512 ACUTE POSTOPERATIVE PAIN OF LEFT SHOULDER: ICD-10-CM

## 2021-11-11 DIAGNOSIS — G89.18 ACUTE POSTOPERATIVE PAIN OF RIGHT SHOULDER: ICD-10-CM

## 2021-11-11 DIAGNOSIS — M25.511 ACUTE POSTOPERATIVE PAIN OF RIGHT SHOULDER: ICD-10-CM

## 2021-11-11 DIAGNOSIS — G89.18 ACUTE POSTOPERATIVE PAIN OF LEFT SHOULDER: ICD-10-CM

## 2021-11-11 PROCEDURE — 97110 THERAPEUTIC EXERCISES: CPT | Mod: GP | Performed by: PHYSICAL THERAPIST

## 2021-11-11 PROCEDURE — 97112 NEUROMUSCULAR REEDUCATION: CPT | Mod: GP | Performed by: PHYSICAL THERAPIST

## 2021-11-11 NOTE — LETTER
[facility]  [address]  [place] [address]  [phone]    2021    Re: Pop Eaton III   :   1965  MRN:  [id number]   REFERRING PHYSICIAN:   William MALIN [facility]    Date of Initial Evaluation:  21  Visits:  Rxs Used: 11  Reason for Referral:     Acute pain of left shoulder  Acute postoperative pain of right shoulder  Acute postoperative pain of left shoulder    PROGRESS  REPORT  Progress reporting period is from 2021 to 2021. 11 visits PT.     SUBJECTIVE  Chip is  4 1/2 months post op, L shoulder extensive debridement, SAD, biceps tenodesis under the direction of Dr. Russell.   Follow up with MD in Dec 2021.  Functionally improving at a rate of  roughly 10-15%/month .   SPADI improved from 38/100 to 30/100 over the month and  Had been 68/100 initial visit 12 weeks ago.   He is roughly 60-70% recovered motion and day to day activity.   Unable to return to meet the demands of a  for [employer] without significant restrictions at this point.   He is limited to 10# floor to waist, 5# waist to shoulder, no use overhead or away from the body greater than 6 inches.   Current Pain level: 1/10   Initial Pain level: 8/10   Changes in function: Yes, see goal flow sheet for change in function   Adverse reactions: None     OBJECTIVE  POst op shoulder  AROM 130-135 vs 160-170 opposite side.   [facility] has advanced rehab phase from post op protective and ROM phase to strength.   Plan is towards  Advacned RTC and scap strengthening with light weight every other day and gentle ROM daily.   Work simulation task progression at 5 months with goal of potential RTW at 6-9 months post op without restrictions.   For now, stay the course with post op care directed by Dr. Russell.     Follow up with Dr. Russell, Dec 14, 2021.     ASSESSMENT/PLAN  Updated problem list and treatment plan:  Diagnosis 1:   L shoulder extensive debridement, SAD, biceps tenodesis     STG/LTGs have been met or progress has been made towards goals:  Yes (See Goal flow sheet completed today.)  Assessment of Progress: The patient's condition is improving.  Self Management Plans:  Patient has been instructed in a home treatment program.    Recommendations:  This patient would benefit from continued therapy.     Frequency:  1 X week, to every other week  once daily  Duration:  for 6 weeks, until consult with MD and go from there .    Thank you for your referral.    INQUIRIES  Therapist:Bal Castillo, PT, ATC, Cert. MDT  [facility] [place] [facility]  [address]  [place] [address]  Phone: [phone]  Fax: [phone]

## 2021-11-29 NOTE — PROGRESS NOTES
Subjective:  HPI  Physical Exam                    Objective:  System    Physical Exam    General     ROS    Assessment/Plan:    PROGRESS  REPORT    Progress reporting period is from 8/02/2021 to 11/11/2021. 11 visits PT.     SUBJECTIVE  Chip is  4 1/2 months post op, L shoulder extensive debridement, SAD, biceps tenodesis under the direction of Dr. Russell.      Follow up with MD in Dec 2021.    Functionally improving at a rate of  roughly 10-15%/month .     SPADI improved from 38/100 to 30/100 over the month and  Had been 68/100 initial visit 12 weeks ago.      He is roughly 60-70% recovered motion and day to day activity.     Unable to return to meet the demands of a  for [employer] without significant restrictions at this point.     He is limited to 10# floor to waist, 5# waist to shoulder, no use overhead or away from the body greater than 6 inches.       Current Pain level: 1/10   Initial Pain level: 8/10   Changes in function: Yes, see goal flow sheet for change in function   Adverse reactions: None;   ,         OBJECTIVE  POst op shoulder  AROM 130-135 vs 160-170 opposite side.     Chip has advanced rehab phase from post op protective and ROM phase to strength.     Plan is towards  Advacned RTC and scap strengthening with light weight every other day and gentle ROM daily.     Work simulation task progression at 5 months with goal of potential RTW at 6-9 months post op without restrictions.     For now, stay the course with post op care directed by Dr. Russell.       Follow up with Dr. Russell, Dec 14, 2021.     ASSESSMENT/PLAN  Updated problem list and treatment plan: Diagnosis 1:   L shoulder extensive debridement, SAD, biceps tenodesis     STG/LTGs have been met or progress has been made towards goals:  Yes (See Goal flow sheet completed today.)  Assessment of Progress: The patient's condition is improving.  Self Management Plans:  Patient has been instructed in a home treatment  program.      Recommendations:  This patient would benefit from continued therapy.     Frequency:  1 X week, to every other week  once daily  Duration:  for 6 weeks, until consult with MD and go from there .        Please refer to the daily flowsheet for treatment today, total treatment time and time spent performing 1:1 timed codes.

## 2021-11-30 ENCOUNTER — THERAPY VISIT (OUTPATIENT)
Dept: PHYSICAL THERAPY | Facility: CLINIC | Age: 56
End: 2021-11-30
Payer: COMMERCIAL

## 2021-11-30 DIAGNOSIS — G89.18 ACUTE POSTOPERATIVE PAIN OF RIGHT SHOULDER: ICD-10-CM

## 2021-11-30 DIAGNOSIS — M25.511 ACUTE POSTOPERATIVE PAIN OF RIGHT SHOULDER: ICD-10-CM

## 2021-11-30 DIAGNOSIS — M25.512 ACUTE PAIN OF LEFT SHOULDER: Primary | ICD-10-CM

## 2021-11-30 PROCEDURE — 97110 THERAPEUTIC EXERCISES: CPT | Mod: GP | Performed by: PHYSICAL THERAPIST

## 2021-11-30 PROCEDURE — 97112 NEUROMUSCULAR REEDUCATION: CPT | Mod: GP | Performed by: PHYSICAL THERAPIST

## 2021-11-30 NOTE — PROGRESS NOTES
Subjective:  HPI  Physical Exam                    Objective:  System    Physical Exam    General     ROS    Assessment/Plan:    PROGRESS  REPORT    Progress reporting period is from 8/02/2021 to 11/30/2021. 12 visits     SUBJECTIVE  5 months post op L Biceps Tenodesis, SAD.     Patient reports of roughly 70-75% returned function compared to the opposite side.     He has not RTW as a  for [employer] at this point.     Primary functional restrictions are reachng behind the body and lifting greater than 25#.     Chip is sleeping decent at night.     No longer resting pain.     Scheduled to see his MD on Dec 14, 2021.        Current Pain level: 1/10   Initial Pain level: 8/10   Changes in function: Yes, see goal flow sheet for change in function   Adverse reactions: None;   ,         OBJECTIVE  L [place]     Reaching : AROM 140, AAROM 155    Posterior reach: T12 vs T8  IR: 90  ER: 40.     Recommend to stay the course with day to day ROM, strength 3x/week     Functional Assessment  Floor to waist : 1 RM 25#  Waist to shoulder: 1RM 28#  Outward reach : 1 RM  17#.     Patient is gradually improving ROM, strength and function with emphasis to HEP and follow up clinically 1x/week , every other week.     MD post op consult appointment scheduled for Dec 14, 2021.     Patient is most likely 8 weeks out before RTW unless directed by his MD different .     Emphasis clinically is to engage work simulation tasks as well as advance strength responsibility at home until RTW directed by MD.       ASSESSMENT/PLAN  Updated problem list and treatment plan: Diagnosis 1:  L shoulder post op Biceps Tenodesis, SAD.       STG/LTGs have been met or progress has been made towards goals:  Yes (See Goal flow sheet completed today.)  Assessment of Progress: The patient's condition is improving.  The patient's condition has potential to improve.  Self Management Plans:  Patient has been instructed in a home treatment  program.    Recommendations:  This patient would benefit from continued therapy.     Frequency:  1 X week, to every other week  once daily  Duration:  for 8 weeks        Please refer to the daily flowsheet for treatment today, total treatment time and time spent performing 1:1 timed codes.

## 2021-12-10 ENCOUNTER — THERAPY VISIT (OUTPATIENT)
Dept: PHYSICAL THERAPY | Facility: CLINIC | Age: 56
End: 2021-12-10
Payer: COMMERCIAL

## 2021-12-10 DIAGNOSIS — M25.512 ACUTE PAIN OF LEFT SHOULDER: Primary | ICD-10-CM

## 2021-12-10 DIAGNOSIS — M25.511 ACUTE POSTOPERATIVE PAIN OF RIGHT SHOULDER: ICD-10-CM

## 2021-12-10 DIAGNOSIS — G89.18 ACUTE POSTOPERATIVE PAIN OF RIGHT SHOULDER: ICD-10-CM

## 2021-12-10 PROCEDURE — 97112 NEUROMUSCULAR REEDUCATION: CPT | Mod: GP | Performed by: PHYSICAL THERAPIST

## 2021-12-10 PROCEDURE — 97110 THERAPEUTIC EXERCISES: CPT | Mod: GP | Performed by: PHYSICAL THERAPIST

## 2021-12-11 NOTE — PROGRESS NOTES
Subjective:  HPI  Physical Exam                    Objective:  System    Physical Exam    General     ROS    Assessment/Plan:    PROGRESS  REPORT    SUBJECTIVE  Chip is 5 1/2 months post op L Biceps Tenodesis, SAD, extensive debridement.      Patient is comfortable and happy with his outcome to this point.      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     He has responsibility of lifting 75# , repetitive reaching , and overhead work in a laboring job.    Follow up with MD is Dec 14, 2021.     Recommend RTW as an   type position, but not ready yet for unrestricted work conditions as a [employer]  Position.     Chip is roughly 85% recovered day to day function outside limitation are heavy lifting of greater than 10-15#, reaching behind the back and quick motion type activity.     SPADI 16/100      Current Pain level: 2/10   Initial Pain level: 8/10   Changes in function: Yes, see goal flow sheet for change in function   Adverse reactions: None;   ,         OBJECTIVE  AROM L 150 vs 160 opposite side. posterior reach to T12 vs T8, ER 70 vs 80, IR 90 each side.     Scap control is good when elevating arm to shoulder height and above.     1 RM floor to waist lift tolerance is 30#, waist to shoudler height 20#, outward reach lift is 15#, no test overhead.     Recommend to stay the course with high reps strengthening, end range of motion restoration, global body strengthening, aerobic exercise and initiate work simulation tasks for 6-8 weeks.     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       ASSESSMENT/PLAN  Updated problem list and treatment plan: Diagnosis 1:   L shoulder post op biceps tenodesis       STG/LTGs have been met or progress has been made towards goals:  Yes (See Goal flow sheet completed today.)  Assessment of Progress: The patient's condition is improving.  The  patient's condition has potential to improve.  Self Management Plans:  Patient has been instructed in a home treatment program.      Recommendations:  This patient would benefit from continued therapy.     Frequency:  Up to 1-3 X week, once daily  Duration:  for 8  Weeks as directed by MD with plan discontinue PT by the end of January 2022        Please refer to the daily flowsheet for treatment today, total treatment time and time spent performing 1:1 timed codes.

## 2021-12-23 ENCOUNTER — THERAPY VISIT (OUTPATIENT)
Dept: PHYSICAL THERAPY | Facility: CLINIC | Age: 56
End: 2021-12-23
Payer: COMMERCIAL

## 2021-12-23 DIAGNOSIS — M25.512 ACUTE PAIN OF LEFT SHOULDER: Primary | ICD-10-CM

## 2021-12-23 DIAGNOSIS — M25.511 ACUTE POSTOPERATIVE PAIN OF RIGHT SHOULDER: ICD-10-CM

## 2021-12-23 DIAGNOSIS — G89.18 ACUTE POSTOPERATIVE PAIN OF RIGHT SHOULDER: ICD-10-CM

## 2021-12-23 PROCEDURE — 97112 NEUROMUSCULAR REEDUCATION: CPT | Mod: GP | Performed by: PHYSICAL THERAPIST

## 2021-12-23 PROCEDURE — 97110 THERAPEUTIC EXERCISES: CPT | Mod: GP | Performed by: PHYSICAL THERAPIST

## 2022-01-01 ENCOUNTER — HEALTH MAINTENANCE LETTER (OUTPATIENT)
Age: 57
End: 2022-01-01

## 2022-01-03 ENCOUNTER — THERAPY VISIT (OUTPATIENT)
Dept: PHYSICAL THERAPY | Facility: CLINIC | Age: 57
End: 2022-01-03
Payer: COMMERCIAL

## 2022-01-03 DIAGNOSIS — M25.512 ACUTE POSTOPERATIVE PAIN OF LEFT SHOULDER: ICD-10-CM

## 2022-01-03 DIAGNOSIS — G89.18 ACUTE POSTOPERATIVE PAIN OF LEFT SHOULDER: ICD-10-CM

## 2022-01-03 DIAGNOSIS — M25.512 ACUTE PAIN OF LEFT SHOULDER: Primary | ICD-10-CM

## 2022-01-03 PROCEDURE — 97110 THERAPEUTIC EXERCISES: CPT | Mod: GP | Performed by: PHYSICAL THERAPIST

## 2022-01-03 PROCEDURE — 97112 NEUROMUSCULAR REEDUCATION: CPT | Mod: GP | Performed by: PHYSICAL THERAPIST

## 2022-01-10 ENCOUNTER — THERAPY VISIT (OUTPATIENT)
Dept: PHYSICAL THERAPY | Facility: CLINIC | Age: 57
End: 2022-01-10
Payer: COMMERCIAL

## 2022-01-10 DIAGNOSIS — M25.512 ACUTE POSTOPERATIVE PAIN OF LEFT SHOULDER: ICD-10-CM

## 2022-01-10 DIAGNOSIS — M25.512 ACUTE PAIN OF LEFT SHOULDER: Primary | ICD-10-CM

## 2022-01-10 DIAGNOSIS — G89.18 ACUTE POSTOPERATIVE PAIN OF LEFT SHOULDER: ICD-10-CM

## 2022-01-10 PROCEDURE — 97110 THERAPEUTIC EXERCISES: CPT | Mod: GP | Performed by: PHYSICAL THERAPIST

## 2022-01-10 PROCEDURE — 97112 NEUROMUSCULAR REEDUCATION: CPT | Mod: GP | Performed by: PHYSICAL THERAPIST

## 2022-01-10 NOTE — PROGRESS NOTES
Subjective:  HPI  Physical Exam                    Objective:  System    Physical Exam    General     ROS    Assessment/Plan:    PROGRESS  REPORT    Progress reporting period is from 6/24/21 to 1/09/2022. .     SUBJECTIVE   6 -7 months post op L RCR .     The last 1-2 weeks, patient reports of slight in crease of general L soreness that may be related to increase rehab load or day to day activity.     He has not RTW at this point      He has been gradually increasing volume as well as resistance to strength exercises every other day.     ROM has continue to move forward.     SPADI 16/100.        Current Pain level: 1/10   Initial Pain level: 8/10   Changes in function: No changes noted in function since last SOAP note   Adverse reactions: None         OBJECTIVE   L shoulder ROM ER 70, IR 90, flexion 165.     RTC Testing endurance is equal bilaterally with arm tucked to his side.     Functional Strength floor to waist max is 40#    Waist to shoulder 25#.     MD follow up next week.     Plan to consult with MD about return to work efforts that require # lifting according to the patient.       ASSESSMENT/PLAN  Updated problem list and treatment plan: Diagnosis 1:  Post op L RCR     STG/LTGs have been met or progress has been made towards goals:  Yes (See Goal flow sheet completed today.)  Assessment of Progress: The patient's condition is improving.  The patient's condition has potential to improve.  Self Management Plans:  Patient has been instructed in a home treatment program.      Recommendations:  This patient would benefit from further evaluation.    Please refer to the daily flowsheet for treatment today, total treatment time and time spent performing 1:1 timed codes.

## 2022-03-18 ENCOUNTER — LAB (OUTPATIENT)
Dept: LAB | Facility: CLINIC | Age: 57
End: 2022-03-18
Payer: COMMERCIAL

## 2022-03-18 DIAGNOSIS — F41.9 ANXIETY DISORDER: ICD-10-CM

## 2022-03-18 DIAGNOSIS — F33.2 MAJOR DEPRESSIVE DISORDER, RECURRENT EPISODE, SEVERE (H): Primary | ICD-10-CM

## 2022-03-18 LAB
ALBUMIN SERPL-MCNC: 3.8 G/DL (ref 3.4–5)
ALP SERPL-CCNC: 83 U/L (ref 40–150)
ALT SERPL W P-5'-P-CCNC: 91 U/L (ref 0–70)
AST SERPL W P-5'-P-CCNC: 54 U/L (ref 0–45)
BILIRUB DIRECT SERPL-MCNC: <0.1 MG/DL (ref 0–0.2)
BILIRUB SERPL-MCNC: 0.2 MG/DL (ref 0.2–1.3)
CHOLEST SERPL-MCNC: 110 MG/DL
FASTING STATUS PATIENT QL REPORTED: YES
HBA1C MFR BLD: 5.7 % (ref 0–5.6)
HDLC SERPL-MCNC: 36 MG/DL
LDLC SERPL CALC-MCNC: 46 MG/DL
NONHDLC SERPL-MCNC: 74 MG/DL
PROT SERPL-MCNC: 6.9 G/DL (ref 6.8–8.8)
TRIGL SERPL-MCNC: 142 MG/DL
VALPROATE SERPL-MCNC: 84 MG/L

## 2022-03-18 PROCEDURE — 80076 HEPATIC FUNCTION PANEL: CPT

## 2022-03-18 PROCEDURE — 80164 ASSAY DIPROPYLACETIC ACD TOT: CPT

## 2022-03-18 PROCEDURE — 36415 COLL VENOUS BLD VENIPUNCTURE: CPT

## 2022-03-18 PROCEDURE — 83036 HEMOGLOBIN GLYCOSYLATED A1C: CPT

## 2022-03-18 PROCEDURE — 80061 LIPID PANEL: CPT

## 2022-05-11 ENCOUNTER — TELEPHONE (OUTPATIENT)
Dept: FAMILY MEDICINE | Facility: CLINIC | Age: 57
End: 2022-05-11
Payer: COMMERCIAL

## 2022-05-11 DIAGNOSIS — F41.1 GAD (GENERALIZED ANXIETY DISORDER): ICD-10-CM

## 2022-05-11 RX ORDER — ALPRAZOLAM 0.5 MG
0.5 TABLET ORAL 2 TIMES DAILY PRN
Qty: 4 TABLET | Refills: 0 | Status: SHIPPED | OUTPATIENT
Start: 2022-05-11 | End: 2023-05-22

## 2022-05-11 NOTE — TELEPHONE ENCOUNTER
Patient called the clinic requesting refills for the ALPRAZolam (XANAX) 0.5 MG tablet . 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  He will be flying on 5/17/22 to [place] for 4 days.    Patient is requesting refills for this medication before his upcoming flight.    Requested Prescriptions   Pending Prescriptions Disp Refills     ALPRAZolam (XANAX) 0.5 MG tablet 15 tablet 0     Sig: Take 1 tablet (0.5 mg) by mouth 2 times daily as needed for anxiety       There is no refill protocol information for this order        Routing refill request to provider for review/approval because:  Drug not on the [facility] refill protocol

## 2022-05-23 ENCOUNTER — OFFICE VISIT (OUTPATIENT)
Dept: FAMILY MEDICINE | Facility: CLINIC | Age: 57
End: 2022-05-23
Payer: COMMERCIAL

## 2022-05-23 VITALS
BODY MASS INDEX: 29.98 KG/M2 | SYSTOLIC BLOOD PRESSURE: 132 MMHG | OXYGEN SATURATION: 97 % | DIASTOLIC BLOOD PRESSURE: 68 MMHG | RESPIRATION RATE: 17 BRPM | WEIGHT: 202.4 LBS | HEIGHT: 69 IN | TEMPERATURE: 97.9 F | HEART RATE: 85 BPM

## 2022-05-23 DIAGNOSIS — M70.22 OLECRANON BURSITIS, LEFT ELBOW: Primary | ICD-10-CM

## 2022-05-23 DIAGNOSIS — R20.0 FINGER NUMBNESS: ICD-10-CM

## 2022-05-23 PROCEDURE — 20605 DRAIN/INJ JOINT/BURSA W/O US: CPT | Mod: LT | Performed by: FAMILY MEDICINE

## 2022-05-23 ASSESSMENT — PATIENT HEALTH QUESTIONNAIRE - PHQ9
SUM OF ALL RESPONSES TO PHQ QUESTIONS 1-9: 0
10. IF YOU CHECKED OFF ANY PROBLEMS, HOW DIFFICULT HAVE THESE PROBLEMS MADE IT FOR YOU TO DO YOUR WORK, TAKE CARE OF THINGS AT HOME, OR GET ALONG WITH OTHER PEOPLE: NOT DIFFICULT AT ALL
SUM OF ALL RESPONSES TO PHQ QUESTIONS 1-9: 0

## 2022-05-23 ASSESSMENT — PAIN SCALES - GENERAL: PAINLEVEL: MILD PAIN (2)

## 2022-05-23 NOTE — PROGRESS NOTES
"  Assessment & Plan     Olecranon bursitis, left elbow  Procedure:  After consent was obtained, using sterile technique the elbow was prepped. The joint was entered and 6 cc's of brown fluid was withdrawn. Steroid 40 mg and 1 cc plain Lidocaine was then injected and the needle withdrawn.  The procedure was well tolerated.  The patient is asked to continue to rest the joint for a few more days before resuming regular activities.  It may be more painful for the first 1-2 days.  Watch for fever, or increased swelling or persistent pain in the joint. Call or return to clinic prn if such symptoms occur or there is failure to improve as anticipated.      Triamcinolone 40 mg   Lot: [id number] 12/2023z  [facility] [address]    Finger numbness    - possibly bursitis related, but could be CTS or radiculopathy  Return in about 1 week (around 5/30/2022) for call with update.    Ayush Santos MD  [facility] DEBBIE Gentile is a 56 year old who presents for the following health issues   HPI     Patient noted swelling of left elbow  Slightly tender  No injury or elbow overuse    Answers for HPI/ROS submitted by the patient on 5/23/2022  If you checked off any problems, how difficult have these problems made it for you to do your work, take care of things at home, or get along with other people?: Not difficult at all  PHQ9 TOTAL SCORE: 0    Review of Systems   Constitutional, HEENT, cardiovascular, pulmonary, gi and gu systems are negative, except as otherwise noted.      Objective    /68 (BP Location: Right arm, Cuff Size: Adult Regular)   Pulse 85   Temp 97.9  F (36.6  C) (Oral)   Resp 17   Ht 1.745 m (5' 8.7\")   Wt 91.8 kg (202 lb 6.4 oz)   SpO2 97%   BMI 30.15 kg/m    Body mass index is 30.15 kg/m .  Physical Exam   GENERAL: healthy, alert and no distress  RESP: lungs clear to auscultation - no rales, rhonchi or wheezes  CV: regular rate and rhythm, no murmur, click or rub, no " peripheral edema   MS: Lt elbow posteriorly with large fluctuant non there swelling

## 2022-06-01 RX ORDER — TRIAMCINOLONE ACETONIDE 40 MG/ML
40 INJECTION, SUSPENSION INTRA-ARTICULAR; INTRAMUSCULAR ONCE
Status: DISCONTINUED | OUTPATIENT
Start: 2022-06-01 | End: 2024-08-28

## 2022-07-19 ENCOUNTER — LAB (OUTPATIENT)
Dept: LAB | Facility: CLINIC | Age: 57
End: 2022-07-19
Payer: COMMERCIAL

## 2022-07-19 DIAGNOSIS — F33.2 MAJOR DEPRESSIVE DISORDER, RECURRENT EPISODE, SEVERE (H): Primary | ICD-10-CM

## 2022-07-19 DIAGNOSIS — F41.1 GENERALIZED ANXIETY DISORDER: ICD-10-CM

## 2022-07-19 LAB
ALBUMIN SERPL-MCNC: 3.9 G/DL (ref 3.4–5)
ALP SERPL-CCNC: 79 U/L (ref 40–150)
ALT SERPL W P-5'-P-CCNC: 90 U/L (ref 0–70)
AST SERPL W P-5'-P-CCNC: 58 U/L (ref 0–45)
BILIRUB DIRECT SERPL-MCNC: <0.1 MG/DL (ref 0–0.2)
BILIRUB SERPL-MCNC: 0.5 MG/DL (ref 0.2–1.3)
PROT SERPL-MCNC: 7 G/DL (ref 6.8–8.8)

## 2022-07-19 PROCEDURE — 80076 HEPATIC FUNCTION PANEL: CPT

## 2022-07-19 PROCEDURE — 80164 ASSAY DIPROPYLACETIC ACD TOT: CPT

## 2022-07-19 PROCEDURE — 36415 COLL VENOUS BLD VENIPUNCTURE: CPT

## 2022-07-20 LAB — VALPROATE SERPL-MCNC: 76.3 UG/ML

## 2023-03-15 ENCOUNTER — LAB (OUTPATIENT)
Dept: LAB | Facility: CLINIC | Age: 58
End: 2023-03-15
Payer: COMMERCIAL

## 2023-03-15 DIAGNOSIS — F33.2 MAJOR DEPRESSIVE DISORDER, RECURRENT EPISODE, SEVERE (H): Primary | ICD-10-CM

## 2023-03-15 DIAGNOSIS — F41.1 GENERALIZED ANXIETY DISORDER: ICD-10-CM

## 2023-03-15 LAB
ALBUMIN SERPL-MCNC: 3.8 G/DL (ref 3.4–5)
ALP SERPL-CCNC: 94 U/L (ref 40–150)
ALT SERPL W P-5'-P-CCNC: 53 U/L (ref 0–70)
AST SERPL W P-5'-P-CCNC: 23 U/L (ref 0–45)
BILIRUB DIRECT SERPL-MCNC: <0.1 MG/DL (ref 0–0.2)
BILIRUB SERPL-MCNC: 0.3 MG/DL (ref 0.2–1.3)
CHOLEST SERPL-MCNC: 105 MG/DL
FASTING STATUS PATIENT QL REPORTED: ABNORMAL
HBA1C MFR BLD: 6 % (ref 0–5.6)
HDLC SERPL-MCNC: 28 MG/DL
LDLC SERPL CALC-MCNC: 27 MG/DL
NONHDLC SERPL-MCNC: 77 MG/DL
PROT SERPL-MCNC: 6.8 G/DL (ref 6.8–8.8)
TRIGL SERPL-MCNC: 251 MG/DL
VALPROATE SERPL-MCNC: 74.1 UG/ML

## 2023-03-15 PROCEDURE — 36415 COLL VENOUS BLD VENIPUNCTURE: CPT

## 2023-03-15 PROCEDURE — 80061 LIPID PANEL: CPT

## 2023-03-15 PROCEDURE — 80076 HEPATIC FUNCTION PANEL: CPT

## 2023-03-15 PROCEDURE — 80164 ASSAY DIPROPYLACETIC ACD TOT: CPT

## 2023-03-15 PROCEDURE — 83036 HEMOGLOBIN GLYCOSYLATED A1C: CPT

## 2023-05-22 ENCOUNTER — OFFICE VISIT (OUTPATIENT)
Dept: FAMILY MEDICINE | Facility: CLINIC | Age: 58
End: 2023-05-22
Payer: COMMERCIAL

## 2023-05-22 VITALS
HEART RATE: 92 BPM | HEIGHT: 68 IN | TEMPERATURE: 98.3 F | SYSTOLIC BLOOD PRESSURE: 124 MMHG | BODY MASS INDEX: 33.86 KG/M2 | DIASTOLIC BLOOD PRESSURE: 75 MMHG | OXYGEN SATURATION: 94 % | WEIGHT: 223.4 LBS | RESPIRATION RATE: 16 BRPM

## 2023-05-22 DIAGNOSIS — G25.81 RESTLESS LEGS SYNDROME (RLS): ICD-10-CM

## 2023-05-22 DIAGNOSIS — R68.82 LOW LIBIDO: ICD-10-CM

## 2023-05-22 DIAGNOSIS — Z12.11 SCREEN FOR COLON CANCER: ICD-10-CM

## 2023-05-22 DIAGNOSIS — F33.1 MAJOR DEPRESSIVE DISORDER, RECURRENT EPISODE, MODERATE (H): Primary | ICD-10-CM

## 2023-05-22 DIAGNOSIS — C43.9 MELANOMA OF SKIN (H): ICD-10-CM

## 2023-05-22 DIAGNOSIS — R63.5 WEIGHT GAIN: ICD-10-CM

## 2023-05-22 DIAGNOSIS — Z00.00 HEALTH CARE MAINTENANCE: ICD-10-CM

## 2023-05-22 PROCEDURE — 99214 OFFICE O/P EST MOD 30 MIN: CPT | Performed by: FAMILY MEDICINE

## 2023-05-22 RX ORDER — PRAMIPEXOLE DIHYDROCHLORIDE 0.25 MG/1
0.25 TABLET ORAL AT BEDTIME
Qty: 30 TABLET | Refills: 1 | Status: SHIPPED | OUTPATIENT
Start: 2023-05-22 | End: 2023-07-19

## 2023-05-22 RX ORDER — GABAPENTIN 300 MG/1
600 CAPSULE ORAL 2 TIMES DAILY
Qty: 120 CAPSULE | Refills: 0 | COMMUNITY
Start: 2023-05-22 | End: 2024-03-27

## 2023-05-22 RX ORDER — QUETIAPINE FUMARATE 300 MG/1
300 TABLET, FILM COATED ORAL AT BEDTIME
Qty: 30 TABLET | Refills: 0 | COMMUNITY
Start: 2023-05-22 | End: 2024-02-12

## 2023-05-22 ASSESSMENT — PATIENT HEALTH QUESTIONNAIRE - PHQ9
SUM OF ALL RESPONSES TO PHQ QUESTIONS 1-9: 13
10. IF YOU CHECKED OFF ANY PROBLEMS, HOW DIFFICULT HAVE THESE PROBLEMS MADE IT FOR YOU TO DO YOUR WORK, TAKE CARE OF THINGS AT HOME, OR GET ALONG WITH OTHER PEOPLE: VERY DIFFICULT
SUM OF ALL RESPONSES TO PHQ QUESTIONS 1-9: 13

## 2023-05-22 NOTE — LETTER
May 22, 2023      [facility] Jairon Eaton III  [address] [address]        Dear ,    We are writing to inform you of your test results.  .this  {results letter list:[id number]}     No results found from the In Basket message.    If you have any questions or concerns, please call the clinic at the number listed above.       Sincerely,

## 2023-05-22 NOTE — PROGRESS NOTES
"  Assessment & Plan   Seferino is a 56 yo M with hx polyps, fhx colon canceer, rls, depression, tinnitus with hearing loss. tobacco use here for routine follow-up and medication check.    Major depressive disorder, recurrent episode, moderate (H)  Generally doing well on current medications except for some side effects, following with mental health    Weight gain  Discussed tips about weight loss, including regular exercise and watching diet.  Medications also a big part, will discuss with this mental health provider    Restless legs syndrome (RLS)  We will try Mirapex  - pramipexole (MIRAPEX) 0.25 MG tablet; Take 1 tablet (0.25 mg) by mouth At Bedtime    Low libido   - Likely medication related, might check testosterone down the road    Melanoma of skin (H)    -Had excision, in remission    Screen for colon cancer  - Colonoscopy Screening  Referral; Future    BMI 33.0-33.9,adult    -We will work on weight loss, also discussed medication with mental health    Health care maintenance  - PSA, screen; Future     Nicotine/Tobacco Cessation:  He reports that he has been smoking cigarettes. He has been smoking an average of .04 packs per day. He has never used smokeless tobacco.  Nicotine/Tobacco Cessation Plan:   Self help information given to patient      BMI:   Estimated body mass index is 33.97 kg/m  as calculated from the following:    Height as of this encounter: 1.727 m (5' 8\").    Weight as of this encounter: 101.3 kg (223 lb 6.4 oz).   Weight management plan: Discussed healthy diet and exercise guidelines    See Patient Instructions    Ayush Santos MD  [facility] [place]    Subjective   Seferino is a 57 year old, presenting for the following health issues:  Bloated (Erectile dysfunction, restless leg syndrome, and colonoscopy)      meds: depakote, celexa, abilify    Depression: States medications are helping, following with mental health as well    Bloated: Weight gain; 21 lb in last year; " "thinks bloating is because of the weight gain.  Likely from antipsychotic medications he is taking    ED: Side effects of medication    RLS: bad when laying in bed, then start to move involuntarily.    Colonoscopy:  Due referral    Wt Readings from Last 4 Encounters:   05/22/23 101.3 kg (223 lb 6.4 oz)   05/23/22 91.8 kg (202 lb 6.4 oz)   06/21/21 84.8 kg (187 lb)   07/13/20 79.4 kg (175 lb)            View : No data to display.              History of Present Illness       Reason for visit:  Restless leg, stomach  Bloating,  Erectile dysfunction  Symptom onset:  More than a month  Symptom intensity:  Severe  Symptom progression:  Worsening  Had these symptoms before:  Yes  Has tried/received treatment for these symptoms:  Yes  Previous treatment was successful:  Yes  Prior treatment description:  Restless leg meds.    He eats 0-1 servings of fruits and vegetables daily.He consumes 2 sweetened beverage(s) daily.He exercises with enough effort to increase his heart rate 20 to 29 minutes per day.  He exercises with enough effort to increase his heart rate 5 days per week.   He is taking medications regularly.    Today's PHQ-9         PHQ-9 Total Score: 13    PHQ-9 Q9 Thoughts of better off dead/self-harm past 2 weeks :   Not at all    How difficult have these problems made it for you to do your work, take care of things at home, or get along with other people: Very difficult       Review of Systems   Constitutional, HEENT, cardiovascular, pulmonary, gi and gu systems are negative, except as otherwise noted.      Objective    /75 (BP Location: Left arm, Patient Position: Sitting, Cuff Size: Adult Regular)   Pulse 92   Temp 98.3  F (36.8  C) (Oral)   Resp 16   Ht 1.727 m (5' 8\")   Wt 101.3 kg (223 lb 6.4 oz)   SpO2 94%   BMI 33.97 kg/m    Body mass index is 33.97 kg/m .  Physical Exam   GENERAL: healthy, alert and no distress  RESP: lungs clear to auscultation - no rales, rhonchi or wheezes  CV: regular " rate and rhythm, normal S1 S2, no S3 or S4, no murmur, click or rub  MS: no gross musculoskeletal defects noted, no edema  PSYCH: mentation appears normal, affect normal/bright

## 2023-05-30 ENCOUNTER — TELEPHONE (OUTPATIENT)
Dept: GASTROENTEROLOGY | Facility: CLINIC | Age: 58
End: 2023-05-30
Payer: COMMERCIAL

## 2023-05-30 ENCOUNTER — HOSPITAL ENCOUNTER (OUTPATIENT)
Facility: AMBULATORY SURGERY CENTER | Age: 58
End: 2023-05-30
Attending: INTERNAL MEDICINE
Payer: COMMERCIAL

## 2023-05-30 NOTE — TELEPHONE ENCOUNTER
Screening Questions  BLUE  KIND OF PREP RED  LOCATION [review exclusion criteria] GREEN  SEDATION TYPE        Y Are you active on mychart?       Ayush Santos Ordering/Referring Provider?        BCBS What type of coverage do you have?      N Have you had a positive covid test in the last 14 days?     33.97 1. BMI  [BMI 40+ - review exclusion criteria& smart-phrase document]    Y  2. Are you able to give consent for your medical care? [IF NO,RN REVIEW]          N  3. Are you taking any prescription pain medications on a routine schedule   (ex narcotics: oxycodone, roxicodone, oxycontin,  and percocet)? [RN Review]        N  3a. EXTENDED PREP What kind of prescription?     N 4. Do you have any chemical dependencies such as alcohol, street drugs, or methadone?        **If yes 3- 5 , please schedule with MAC sedation.**          IF YES TO ANY 6 - 10 - HOSPITAL SETTING ONLY.     N 6.   Do you need assistance transferring?     N 7.   Have you had a heart or lung transplant?    N 8.   Are you currently on dialysis?   N 9.   Do you use daily home oxygen?   N 10. Do you take nitroglycerin?   10a. N If yes, how often?     N 11. Are you currently pregnant?    11a. N If yes, how many weeks? [ Greater than 12 weeks, OR NEEDED]    N 12. Do you have Pulmonary Hypertension? *NEED PAC APPT AT [facility] w/ MAC*     N 13. [review exclusion criteria]  Do you have any implantable devices in your body (pacemaker, defib, LVAD)?    N 14. In the past 6 months, have you had any heart related issues including cardiomyopathy or heart attack?     14a. N If yes, did it require cardiac stenting if so when?     N 15. Have you had a stroke or Transient ischemic attack (TIA - aka  mini stroke ) within 6 months?      N 16. Do you have mod to severe Obstructive Sleep Apnea?  [Hospital only]    N 17. Do you have SEVERE AND UNCONTROLLED asthma? *NEED PAC APPT AT [facility] w/MAC*     18.Do you take blood thinners?  No     19. Do you take any of the  "following medications?    Phentermine    Ozempic    Wegovy (Semaglutide)      19a. If yes, \"Hold for 7 days before procedure.  Please consult your prescribing provider if you have questions about holding this medication.\"     N  20. Do you have chronic kidney disease?      N  21. Do you have a diagnosis of diabetes?     N  22. On a regular basis do you go 3-5 days between bowel movements?      23. Preferred LOCAL Pharmacy for Pre Prescription      [id number] [address]  AT [facility]        - CLOSING REMINDERS -    You will receive a call from a Nurse to review instructions and health history.  This assessment must be completed prior to your procedure.  Failure to complete the Nurse assessment may result in the procedure being cancelled.      On the day of your procedure, please designatean adult(s) who can drive you home stay with you for the next 24 hours. The medicines used in the exam will make you sleepy. You will not be able to drive.      You cannot take public transportation, ride share services, or non-medical taxi service without a responsible caregiver.  Medical transport services are allowed with the requirement that a responsible caregiver will receive you at your destination.  We require that drivers and caregivers are confirmed prior to your procedure.      - SCHEDULING DETAILS -  N & N Hospital Setting Required & If yes, what is the exclusion?   GALE  Surgeon    8/10  Date of Procedure  Lower Endoscopy [Colonoscopy]  [facility]ALAX GATORADE WITHOUT MAGNEISUM CITRATE Which Colonoscopy Prep was Sent?     MAC Sedation Type     N PAC / Pre-op Required                 "

## 2023-07-19 DIAGNOSIS — G25.81 RESTLESS LEGS SYNDROME (RLS): ICD-10-CM

## 2023-07-19 RX ORDER — PRAMIPEXOLE DIHYDROCHLORIDE 0.25 MG/1
TABLET ORAL
Qty: 30 TABLET | Refills: 0 | Status: SHIPPED | OUTPATIENT
Start: 2023-07-19 | End: 2023-08-14

## 2023-07-26 ENCOUNTER — TELEPHONE (OUTPATIENT)
Dept: GASTROENTEROLOGY | Facility: CLINIC | Age: 58
End: 2023-07-26
Payer: COMMERCIAL

## 2023-07-26 NOTE — TELEPHONE ENCOUNTER
Caller:Paddy  Reason for Reschedule/Cancellation (please be detailed, any staff messages or encounters to note?): md out of office      Prior to reschedule please review:  Ordering Provider: Ayush Santos  Sedation per order: mac  Does patient have any ASC Exclusions, please identify?: n      Notes on Cancelled Procedure:  Procedure: Lower Endoscopy [Colonoscopy]   Date: 8/10  Location: [facility] [place]; [address], [place] [address]  Surgeon: Farnaz      Rescheduled: No  Left him message to call back and reschedule and also sent SeamBLiSShart  message/case in depot

## 2023-07-26 NOTE — TELEPHONE ENCOUNTER
Caller: Pop Eaton III    Reason for Reschedule/Cancellation (please be detailed, any staff messages or encounters to note?): Patient returning call to reschedule, provider out      Prior to reschedule please review:  Ordering Provider: Ayush Santos MD  Sedation per order: MAC  Does patient have any ASC Exclusions, please identify?: PER ORDER      Notes on Cancelled Procedure:  Procedure: Lower Endoscopy [Colonoscopy]   Date: 8/10  Location: [facility]; [address], [place] [address]  Surgeon: GALE      Rescheduled: Yes  Procedure: Lower Endoscopy [Colonoscopy]   Date: 11/22  Location: [facility]; [address], [place] [address]  Surgeon: GALE  Sedation Level Scheduled  MAC,  Reason for Sedation Level PER ORDER  Prep/Instructions updated and sent: LETTER     Send In - basket message to Panc - Babatunde Pool if EUS  procedure is canceled or rescheduled: [ N/A, YES or NO] N/A

## 2023-08-02 ENCOUNTER — LAB (OUTPATIENT)
Dept: LAB | Facility: CLINIC | Age: 58
End: 2023-08-02
Payer: COMMERCIAL

## 2023-08-02 DIAGNOSIS — Z00.00 HEALTH CARE MAINTENANCE: ICD-10-CM

## 2023-08-02 DIAGNOSIS — F33.2 SEVERE RECURRENT MAJOR DEPRESSION WITHOUT PSYCHOTIC FEATURES (H): Primary | ICD-10-CM

## 2023-08-02 LAB
CHOLEST SERPL-MCNC: 129 MG/DL
HBA1C MFR BLD: 6.1 % (ref 0–5.6)
HDLC SERPL-MCNC: 27 MG/DL
LDLC SERPL CALC-MCNC: 60 MG/DL
NONHDLC SERPL-MCNC: 102 MG/DL
PSA SERPL DL<=0.01 NG/ML-MCNC: 2.64 NG/ML (ref 0–3.5)
TRIGL SERPL-MCNC: 208 MG/DL

## 2023-08-02 PROCEDURE — G0103 PSA SCREENING: HCPCS

## 2023-08-02 PROCEDURE — 36415 COLL VENOUS BLD VENIPUNCTURE: CPT

## 2023-08-02 PROCEDURE — 80076 HEPATIC FUNCTION PANEL: CPT

## 2023-08-02 PROCEDURE — 80061 LIPID PANEL: CPT

## 2023-08-02 PROCEDURE — 83036 HEMOGLOBIN GLYCOSYLATED A1C: CPT

## 2023-08-03 LAB
ALBUMIN SERPL BCG-MCNC: 4.7 G/DL (ref 3.5–5.2)
ALP SERPL-CCNC: 72 U/L (ref 40–129)
ALT SERPL W P-5'-P-CCNC: 46 U/L (ref 0–70)
AST SERPL W P-5'-P-CCNC: 41 U/L (ref 0–45)
BILIRUB DIRECT SERPL-MCNC: <0.2 MG/DL (ref 0–0.3)
BILIRUB SERPL-MCNC: 0.3 MG/DL
PROT SERPL-MCNC: 7.1 G/DL (ref 6.4–8.3)

## 2023-08-14 DIAGNOSIS — G25.81 RESTLESS LEGS SYNDROME (RLS): ICD-10-CM

## 2023-08-14 RX ORDER — PRAMIPEXOLE DIHYDROCHLORIDE 0.25 MG/1
TABLET ORAL
Qty: 30 TABLET | Refills: 1 | Status: SHIPPED | OUTPATIENT
Start: 2023-08-14 | End: 2023-10-16

## 2023-08-24 ENCOUNTER — TELEPHONE (OUTPATIENT)
Dept: GASTROENTEROLOGY | Facility: CLINIC | Age: 58
End: 2023-08-24
Payer: COMMERCIAL

## 2023-08-24 NOTE — TELEPHONE ENCOUNTER
Caller: Pop Eaton III    Reason for Reschedule/Cancellation (please be detailed, any staff messages or encounters to note?): provider unavailable 1st Attempt. Left a voicemail and sent a SendRRt Message/letter sent. Case in depot. SW      Prior to reschedule please review:  Ordering Provider: Ayush Santos MD  Sedation per order: MAC  Does patient have any ASC Exclusions, please identify?: NO        Notes on Cancelled Procedure:  Procedure: Lower Endoscopy [Colonoscopy]   Date: 11/22/23  Location: [facility] [place]; [address], [place] [address]  Surgeon: GALE      Rescheduled: No

## 2023-08-25 NOTE — TELEPHONE ENCOUNTER
Caller: Pop Eaton III    Reason for Reschedule/Cancellation (please be detailed, any staff messages or encounters to note?): DR MIRANDA, 3RD CLINIC RESCHEDULE      Rescheduled: Yes  Procedure: Lower Endoscopy [Colonoscopy]   Date: 12/15  Location: [facility] [place]; [address], [place] [address]  Surgeon: CASPER  Sedation Level Scheduled  MAC,  Reason for Sedation Level PER ORDER  Prep/Instructions updated and sent: YES       Send In - basket message to Panc - Babatunde Pool if EUS  procedure is canceled or rescheduled: [ N/A, YES or NO] N/A

## 2023-10-15 DIAGNOSIS — G25.81 RESTLESS LEGS SYNDROME (RLS): ICD-10-CM

## 2023-10-16 RX ORDER — PRAMIPEXOLE DIHYDROCHLORIDE 0.25 MG/1
TABLET ORAL
Qty: 30 TABLET | Refills: 0 | Status: SHIPPED | OUTPATIENT
Start: 2023-10-16 | End: 2023-10-16

## 2023-10-16 RX ORDER — PRAMIPEXOLE DIHYDROCHLORIDE 0.25 MG/1
TABLET ORAL
Qty: 30 TABLET | Refills: 0 | Status: SHIPPED | OUTPATIENT
Start: 2023-10-16 | End: 2023-11-17

## 2023-10-16 NOTE — TELEPHONE ENCOUNTER
"Transmission to pharmacy failed for pramipexole.     \"E-Prescribing Status: Transmission to pharmacy failed (10/16/2023 12:32 PM CDT)\"    Rx re-sent. E-Prescribing Status: Receipt confirmed by pharmacy (10/16/2023 12:36 PM CDT)     Bailey Tobias RN   [facility]  "

## 2023-11-08 ENCOUNTER — TELEPHONE (OUTPATIENT)
Dept: GASTROENTEROLOGY | Facility: CLINIC | Age: 58
End: 2023-11-08
Payer: COMMERCIAL

## 2023-11-08 NOTE — TELEPHONE ENCOUNTER
Caller: Paddy  Reason for Reschedule/Cancellation (please be detailed, any staff messages or encounters to note?): MD RODRIGUEZ/  This is the 4th time patient is being reschedule due to providers out of office      Prior to reschedule please review:  Ordering Provider: Ayush Santos  Sedation per order: mac  Does patient have any ASC Exclusions, please identify?: n      Notes on Cancelled Procedure:  Procedure: Lower Endoscopy [Colonoscopy]   Date: 12/15  Location: [facility]; [address], [place] [address]  Surgeon: Isabelle      Rescheduled: Yes  Procedure: Lower Endoscopy [Colonoscopy]   Date: 12/19  Location: [facility]; [address] [facility] , [place] [address]  Surgeon: Asiya  Sedation Level Scheduled  mac,  Reason for Sedation Level ordered on block  Prep/Instructions updated and sent: y       Send In - basket message to Panc - Babatunde Pool if EUS  procedure is canceled or rescheduled: [ N/A, YES or NO] n/a

## 2023-11-16 DIAGNOSIS — G25.81 RESTLESS LEGS SYNDROME (RLS): ICD-10-CM

## 2023-11-17 ENCOUNTER — OFFICE VISIT (OUTPATIENT)
Dept: FAMILY MEDICINE | Facility: CLINIC | Age: 58
End: 2023-11-17
Payer: COMMERCIAL

## 2023-11-17 VITALS
HEIGHT: 69 IN | SYSTOLIC BLOOD PRESSURE: 136 MMHG | BODY MASS INDEX: 33.36 KG/M2 | RESPIRATION RATE: 19 BRPM | DIASTOLIC BLOOD PRESSURE: 85 MMHG | WEIGHT: 225.2 LBS

## 2023-11-17 DIAGNOSIS — Z72.0 TOBACCO USE: ICD-10-CM

## 2023-11-17 DIAGNOSIS — Z12.11 SCREEN FOR COLON CANCER: ICD-10-CM

## 2023-11-17 DIAGNOSIS — Z00.00 ROUTINE HISTORY AND PHYSICAL EXAMINATION OF ADULT: Primary | ICD-10-CM

## 2023-11-17 DIAGNOSIS — C43.9 MELANOMA OF SKIN (H): ICD-10-CM

## 2023-11-17 DIAGNOSIS — G25.81 RESTLESS LEGS SYNDROME (RLS): ICD-10-CM

## 2023-11-17 DIAGNOSIS — N52.9 ERECTILE DYSFUNCTION, UNSPECIFIED ERECTILE DYSFUNCTION TYPE: ICD-10-CM

## 2023-11-17 DIAGNOSIS — Z87.891 PERSONAL HISTORY OF TOBACCO USE: ICD-10-CM

## 2023-11-17 DIAGNOSIS — Z11.59 NEED FOR HEPATITIS C SCREENING TEST: ICD-10-CM

## 2023-11-17 PROCEDURE — G0296 VISIT TO DETERM LDCT ELIG: HCPCS | Performed by: FAMILY MEDICINE

## 2023-11-17 PROCEDURE — 90471 IMMUNIZATION ADMIN: CPT | Performed by: FAMILY MEDICINE

## 2023-11-17 PROCEDURE — 90715 TDAP VACCINE 7 YRS/> IM: CPT | Performed by: FAMILY MEDICINE

## 2023-11-17 PROCEDURE — 99396 PREV VISIT EST AGE 40-64: CPT | Mod: 25 | Performed by: FAMILY MEDICINE

## 2023-11-17 RX ORDER — PRAMIPEXOLE DIHYDROCHLORIDE 0.25 MG/1
TABLET ORAL
Qty: 30 TABLET | Refills: 1 | Status: SHIPPED | OUTPATIENT
Start: 2023-11-17 | End: 2023-11-17 | Stop reason: DRUGHIGH

## 2023-11-17 RX ORDER — TADALAFIL 10 MG/1
TABLET ORAL
Qty: 30 TABLET | Refills: 1 | Status: SHIPPED | OUTPATIENT
Start: 2023-11-17 | End: 2024-03-27

## 2023-11-17 RX ORDER — PRAMIPEXOLE DIHYDROCHLORIDE 0.5 MG/1
0.5 TABLET ORAL 3 TIMES DAILY
Qty: 30 TABLET | Refills: 0 | Status: SHIPPED | OUTPATIENT
Start: 2023-11-17 | End: 2023-11-27

## 2023-11-17 ASSESSMENT — ENCOUNTER SYMPTOMS
DIARRHEA: 0
ABDOMINAL PAIN: 0
FEVER: 0
ARTHRALGIAS: 0
NERVOUS/ANXIOUS: 0
NAUSEA: 0
HEMATURIA: 0
JOINT SWELLING: 0
CHILLS: 0
CONSTIPATION: 0
DIZZINESS: 0
EYE PAIN: 0
PALPITATIONS: 0
FREQUENCY: 0
DYSURIA: 0
SHORTNESS OF BREATH: 0
WEAKNESS: 0
HEMATOCHEZIA: 0
HEADACHES: 0
HEARTBURN: 0
MYALGIAS: 0
COUGH: 0
PARESTHESIAS: 0
SORE THROAT: 0

## 2023-11-17 ASSESSMENT — PATIENT HEALTH QUESTIONNAIRE - PHQ9
SUM OF ALL RESPONSES TO PHQ QUESTIONS 1-9: 7
10. IF YOU CHECKED OFF ANY PROBLEMS, HOW DIFFICULT HAVE THESE PROBLEMS MADE IT FOR YOU TO DO YOUR WORK, TAKE CARE OF THINGS AT HOME, OR GET ALONG WITH OTHER PEOPLE: SOMEWHAT DIFFICULT
SUM OF ALL RESPONSES TO PHQ QUESTIONS 1-9: 7

## 2023-11-17 ASSESSMENT — PAIN SCALES - GENERAL: PAINLEVEL: NO PAIN (0)

## 2023-11-17 NOTE — PROGRESS NOTES
SUBJECTIVE:   Seferino is a 58 year old, presenting for the following:  Physical        11/17/2023     1:45 PM   Additional Questions   Roomed by Jinny COY   Accompanied by self     Healthy Habits:     Getting at least 3 servings of Calcium per day:  Yes    Bi-annual eye exam:  NO    Dental care twice a year:  Yes    Sleep apnea or symptoms of sleep apnea:  Excessive snoring    Diet:  Regular (no restrictions)    Frequency of exercise:  1 day/week    Duration of exercise:  15-30 minutes    Taking medications regularly:  Yes    Medication side effects:  None    Additional concerns today:  No    ED; for over 1 yr, cannot hold erection.    Colonoscopy: schedule in Dec 2023    Smoking:  Smokes about 10 cig a week; used to do 1 PPD    Weight Gain:    On antipsychotic medication; makes him want to eat at night  Wt Readings from Last 4 Encounters:   11/17/23 102.2 kg (225 lb 3.2 oz)   05/22/23 101.3 kg (223 lb 6.4 oz)   05/23/22 91.8 kg (202 lb 6.4 oz)   06/21/21 84.8 kg (187 lb)      Today's PHQ-9 Score:       11/17/2023     1:44 PM   PHQ-9 SCORE   PHQ-9 Total Score MyChart 7 (Mild depression)   PHQ-9 Total Score 7       Have you ever done Advance Care Planning? (For example, a Health Directive, POLST, or a discussion with a medical provider or your loved ones about your wishes): No, advance care planning information given to patient to review.  Patient declined advance care planning discussion at this time.    Social History     Tobacco Use    Smoking status: Light Smoker     Packs/day: .04     Types: Cigarettes    Smokeless tobacco: Never   Substance Use Topics    Alcohol use: Yes     Alcohol/week: 0.0 standard drinks of alcohol     Comment: 1-2 drinks per month              11/17/2023     1:51 PM   Alcohol Use   Prescreen: >3 drinks/day or >7 drinks/week? No          No data to display                Last PSA:   PSA   Date Value Ref Range Status   08/07/2018 3.35 0 - 4 ug/L Final     Comment:     Assay Method:   Chemiluminescence using Siemens Vista analyzer     Prostate Specific Antigen Screen   Date Value Ref Range Status   08/02/2023 2.64 0.00 - 3.50 ng/mL Final       Reviewed orders with patient. Reviewed health maintenance and updated orders accordingly - Yes  Patient Active Problem List   Diagnosis    History of colonic polyps, 2/2017    Family history of prostate cancer    Restless legs syndrome    Mild major depression (H)    SNHL (sensory-neural hearing loss), asymmetrical    Tinnitus, bilateral    Major depressive disorder, recurrent episode, moderate (H)    Melanoma of skin (H)     Past Surgical History:   Procedure Laterality Date    ARTHROSCOPY KNEE RT/LT Left 2013    meniscal tear     ARTHROSCOPY KNEE RT/LT Right 10/2016    medial meniscal tear     COLONOSCOPY  02/2017    Q 3 years for polyps    COLONOSCOPY WITH CO2 INSUFFLATION N/A 2/15/2017    Procedure: COLONOSCOPY WITH CO2 INSUFFLATION;  Surgeon: Galileo Lezama DO;  Location: [facility] OR    LAPAROSCOPIC APPENDECTOMY  2010    LAPAROSCOPIC CHOLECYSTECTOMY  2010    SURGICAL HISTORY OF -  Left 1995    corneal transplant X 2 on left eye       Social History     Tobacco Use    Smoking status: Light Smoker     Packs/day: .04     Types: Cigarettes    Smokeless tobacco: Never   Substance Use Topics    Alcohol use: Yes     Alcohol/week: 0.0 standard drinks of alcohol     Comment: 1-2 drinks per month      Family History   Problem Relation Age of Onset    Prostate Cancer Maternal Uncle         X2           Reviewed and updated as needed this visit by clinical staff   Tobacco  Allergies  Meds              Reviewed and updated as needed this visit by Provider                     Review of Systems   Constitutional:  Negative for chills and fever.   HENT:  Negative for congestion, ear pain, hearing loss and sore throat.    Eyes:  Negative for pain and visual disturbance.   Respiratory:  Negative for cough and shortness of breath.    Cardiovascular:  Negative for chest  pain, palpitations and peripheral edema.   Gastrointestinal:  Negative for abdominal pain, constipation, diarrhea, heartburn, hematochezia and nausea.   Genitourinary:  Positive for impotence. Negative for dysuria, frequency, genital sores, hematuria, penile discharge and urgency.   Musculoskeletal:  Negative for arthralgias, joint swelling and myalgias.   Skin:  Negative for rash.   Neurological:  Negative for dizziness, weakness, headaches and paresthesias.   Psychiatric/Behavioral:  Negative for mood changes. The patient is not nervous/anxious.        OBJECTIVE:   There were no vitals taken for this visit.    Physical Exam  GENERAL: healthy, alert and no distress  EYES: Eyes grossly normal to inspection, PERRL and conjunctivae and sclerae normal  HENT: ear canals and TM's normal, nose and mouth without ulcers or lesions  NECK: no adenopathy, no asymmetry, masses, or scars and thyroid normal to palpation  RESP: lungs clear to auscultation - no rales, rhonchi or wheezes  CV: regular rate and rhythm, normal S1 S2, no S3 or S4, no murmur, click or rub, no peripheral edema and peripheral pulses strong  ABDOMEN: soft, nontender, no hepatosplenomegaly, no masses and bowel sounds normal  MS: no gross musculoskeletal defects noted, no edema  SKIN: no suspicious lesions or rashes  NEURO: Normal strength and tone, mentation intact and speech normal  PSYCH: mentation appears normal, affect normal/bright    ASSESSMENT/PLAN:   Seferino was seen today for physical.    Diagnoses and all orders for this visit:    Routine history and physical examination of adult      -  Reviewed labs from 2 months ago    Melanoma of skin (H)      -   Follows with dermatology  Tobacco use  -     Prof fee: Shared Decision Making for Lung Cancer Screening  -     CT Chest Lung Cancer Scrn Low Dose wo; Future    Need for hepatitis C screening test  -     Hepatitis C Screen Reflex to HCV RNA Quant and Genotype; Future    Screen for colon cancer    Erectile  "dysfunction, unspecified erectile dysfunction type  -     tadalafil (CIALIS) 10 MG tablet; 5-10 mg (0.5 -1 tab)as a single dose 30 - 60 minutes prior to anticipated sexual activity; do not take more than once daily.    Restless legs syndrome (RLS)  -     pramipexole (MIRAPEX) 0.5 MG tablet; Take 1 tablet (0.5 mg) by mouth 3 times daily    Personal history of tobacco use    Other orders  -     TDAP 7+ (ADACEL,BOOSTRIX)        Patient has been advised of split billing requirements and indicates understanding: Yes      COUNSELING:   Reviewed preventive health counseling, as reflected in patient instructions       Regular exercise       Healthy diet/nutrition       Hearing screening       Immunizations  Vaccinated for: TDAP ; declines flu and Covid         Consider lung cancer screening for ages 55-80 years (77 for Medicare) and 20 pack-year smoking history       BMI:   Estimated body mass index is 33.97 kg/m  as calculated from the following:    Height as of 5/22/23: 1.727 m (5' 8\").    Weight as of 5/22/23: 101.3 kg (223 lb 6.4 oz).   Weight management plan: Discussed healthy diet and exercise guidelines      He reports that he has been smoking cigarettes. He has been smoking an average of .04 packs per day. He has never used smokeless tobacco.  Nicotine/Tobacco Cessation Plan:   Self help information given to patient      {Counseling Resources  [place] Preventive Services Task Force  Cholesterol Screening  Health diet/nutrition  Pooled Cohorts Equation Calculator  [employer]'s MyPlate  ASA Prophylaxis  Lung CA Screening  Osteoporosis prevention/bone health  {Prostate Cancer Screening  Consider for men 55-69 per guidance from USPSTF    Ayush Santos MD  [facility] [place]  Answers submitted by the patient for this visit:  Patient Health Questionnaire (Submitted on 11/17/2023)  If you checked off any problems, how difficult have these problems made it for you to do your work, take care of things at home, or " get along with other people?: Somewhat difficult  PHQ9 TOTAL SCORE: 7    Lung Cancer Screening Shared Decision Making Visit     Pop Eaton III, a 58 year old male, is eligible for lung cancer screening    History   Smoking Status    Light Smoker    Packs/day: 0.04    Types: Cigarettes   Smokeless Tobacco    Never       I have discussed with patient the risks and benefits of screening for lung cancer with low-dose CT.     The risks include:    radiation exposure: one low dose chest CT has as much ionizing radiation as about 15 chest x-rays, or 6 months of background radiation living in [place]      false positives: most findings/nodules are NOT cancer, but some might still require additional diagnostic evaluation, including biopsy    over-diagnosis: some slow growing cancers that might never have been clinically significant will be detected and treated unnecessarily     The benefit of early detection of lung cancer is contingent upon adherence to annual screening or more frequent follow up if indicated.     Furthermore, to benefit from screening, Pop must be willing and able to undergo diagnostic procedures, if indicated. Although no specific guide is available for determining severity of comorbidities, it is reasonable to withhold screening in patients who have greater mortality risk from other diseases.     We did discuss that the best way to prevent lung cancer is to not smoke.    Some patients may value a numeric estimation of lung cancer risk when evaluating if lung cancer screening is right for them, here is one calculator:    ShouldIScreen

## 2023-11-17 NOTE — PATIENT INSTRUCTIONS
Lung Cancer Screening   Frequently Asked Questions  If you are at high-risk for lung cancer, getting screened with low-dose computed tomography (LDCT) every year can help save your life. This handout offers answers to some of the most common questions about lung cancer screening. If you have other questions, please call [phone] ([phone]).     What is it?  Lung cancer screening uses special X-ray technology to create an image of your lung tissue. The exam is quick and easy and takes less than 10 seconds. We don t give you any medicine or use any needles. You can eat before and after the exam. You don t need to change your clothes as long as the clothing on your chest doesn t contain metal. But, you do need to be able to hold your breath for at least 6 seconds during the exam.    What is the goal of lung cancer screening?  The goal of lung cancer screening is to save lives. Many times, lung cancer is not found until a person starts having physical symptoms. Lung cancer screening can help detect lung cancer in the earliest stages when it may be easier to treat.    Who should be screened for lung cancer?  We suggest lung cancer screening for anyone who is at high-risk for lung cancer. You are in the high-risk group if you:      are between the ages of 55 and 79, and    have smoked at least 1 pack of cigarettes a day for 20 or more years, and    still smoke or have quit within the past 15 years.    However, if you have a new cough or shortness of breath, you should talk to your doctor before being screened.    Why does it matter if I have symptoms?  Certain symptoms can be a sign that you have a condition in your lungs that should be checked and treated by your doctor. These symptoms include fever, chest pain, a new or changing cough, shortness of breath that you have never felt before, coughing up blood or unexplained weight loss. Having any of these symptoms can greatly affect the results of lung  cancer screening.       Should all smokers get an LDCT lung cancer screening exam?  It depends. Lung cancer screening is for a very specific group of men and women who have a history of heavy smoking over a long period of time (see  Who should be screened for lung cancer  above).  I am in the high-risk group, but have been diagnosed with cancer in the past. Is LDCT lung cancer screening right for me?  In some cases, you should not have LDCT lung screening, such as when your doctor is already following your cancer with CT scan studies. Your doctor will help you decide if LDCT lung screening is right for you.  Do I need to have a screening exam every year?  Yes. If you are in the high-risk group described earlier, you should get an LDCT lung cancer screening exam every year until you are 79, or are no longer willing or able to undergo screening and possible procedures to diagnose and treat lung cancer.  How effective is LDCT at preventing death from lung cancer?  Studies have shown that LDCT lung cancer screening can lower the risk of death from lung cancer by 20 percent in people who are at high-risk.  What are the risks?  There are some risks and limitations of LDCT lung cancer screening. We want to make sure you understand the risks and benefits, so please let us know if you have any questions. Your doctor may want to talk with you more about these risks.    Radiation exposure: As with any exam that uses radiation, there is a very small increased risk of cancer. The amount of radiation in LDCT is small--about the same amount a person would get from a mammogram. Your doctor orders the exam when he or she feels the potential benefits outweigh the risks.    False negatives: No test is perfect, including LDCT. It is possible that you may have a medical condition, including lung cancer, that is not found during your exam. This is called a false negative result.    False positives and more testing: LDCT very often finds  something in the lung that could be cancer, but in fact is not. This is called a false positive result. False positive tests often cause anxiety. To make sure these findings are not cancer, you may need to have more tests. These tests will be done only if you give us permission. Sometimes patients need a treatment that can have side effects, such as a biopsy. For more information on false positives, see  What can I expect from the results?     Findings not related to lung cancer: Your LDCT exam also takes pictures of areas of your body next to your lungs. In a very small number of cases, the CT scan will show an abnormal finding in one of these areas, such as your kidneys, adrenal glands, liver or thyroid. This finding may not be serious, but you may need more tests. Your doctor can help you decide what other tests you may need, if any.  What can I expect from the results?  About 1 out of 4 LDCT exams will find something that may need more tests. Most of the time, these findings are lung nodules. Lung nodules are very small collections of tissue in the lung. These nodules are very common, and the vast majority--more than 97 percent--are not cancer (benign). Most are normal lymph nodes or small areas of scarring from past infections.  But, if a small lung nodule is found to be cancer, the cancer can be cured more than 90 percent of the time. To know if the nodule is cancer, we may need to get more images before your next yearly screening exam. If the nodule has suspicious features (for example, it is large, has an odd shape or grows over time), we will refer you to a specialist for further testing.  Will my doctor also get the results?  Yes. Your doctor will get a copy of your results.  Is it okay to keep smoking now that there s a cancer screening exam?  No. Tobacco is one of the strongest cancer-causing agents. It causes not only lung cancer, but other cancers and cardiovascular (heart) diseases as well. The damage  caused by smoking builds over time. This means that the longer you smoke, the higher your risk of disease. While it is never too late to quit, the sooner you quit, the better.  Where can I find help to quit smoking?  The best way to prevent lung cancer is to stop smoking. If you have already quit smoking, congratulations and keep it up! For help on quitting smoking, please call [employer] at 1-401-QUITNOW ([phone]) or the [employer] at 1-651.873.1644 to find local resources near you.  One-on-one health coaching:  If you d prefer to work individually with a health care provider on tobacco cessation, we offer:      Medication Therapy Management:  Our specially trained pharmacists work closely with you and your doctor to help you quit smoking.  Call [phone] or [phone] (toll free).

## 2023-11-20 ENCOUNTER — PATIENT OUTREACH (OUTPATIENT)
Dept: GASTROENTEROLOGY | Facility: CLINIC | Age: 58
End: 2023-11-20
Payer: COMMERCIAL

## 2023-11-26 DIAGNOSIS — G25.81 RESTLESS LEGS SYNDROME (RLS): ICD-10-CM

## 2023-11-27 RX ORDER — PRAMIPEXOLE DIHYDROCHLORIDE 0.5 MG/1
TABLET ORAL
Qty: 30 TABLET | Refills: 2 | Status: SHIPPED | OUTPATIENT
Start: 2023-11-27 | End: 2023-12-27

## 2023-11-30 ENCOUNTER — TRANSFERRED RECORDS (OUTPATIENT)
Dept: HEALTH INFORMATION MANAGEMENT | Facility: CLINIC | Age: 58
End: 2023-11-30
Payer: COMMERCIAL

## 2023-12-05 ENCOUNTER — ANCILLARY PROCEDURE (OUTPATIENT)
Dept: CT IMAGING | Facility: CLINIC | Age: 58
End: 2023-12-05
Attending: FAMILY MEDICINE
Payer: COMMERCIAL

## 2023-12-05 DIAGNOSIS — Z72.0 TOBACCO USE: ICD-10-CM

## 2023-12-05 PROCEDURE — 71271 CT THORAX LUNG CANCER SCR C-: CPT | Mod: TC | Performed by: RADIOLOGY

## 2023-12-12 RX ORDER — LUMATEPERONE 10.5 MG/1
CAPSULE ORAL
COMMUNITY
Start: 2023-11-24 | End: 2024-03-27

## 2023-12-12 RX ORDER — TRAMADOL HYDROCHLORIDE 50 MG/1
TABLET ORAL
COMMUNITY
End: 2024-03-27

## 2023-12-12 RX ORDER — CYCLOBENZAPRINE HCL 10 MG
TABLET ORAL
COMMUNITY
End: 2024-03-27

## 2023-12-18 ENCOUNTER — ANESTHESIA EVENT (OUTPATIENT)
Dept: GASTROENTEROLOGY | Facility: CLINIC | Age: 58
End: 2023-12-18
Payer: COMMERCIAL

## 2023-12-18 ASSESSMENT — LIFESTYLE VARIABLES: TOBACCO_USE: 1

## 2023-12-18 NOTE — H&P
[facility] History and Physical    Popgladis Eaton III MRN# [id number]   Age: 58 year old YOB: 1965     Date of Admission:  (Not on file)    Home clinic: [facility]  Primary care provider: Ayush Santos          Impression and Plan:   Impression:   Colon cancer screening [Z12.11]  Last colonoscopy 2017-multiple polyps      Plan:   Proceed to Colonoscopy as planned.  The procedure, risks(bleeding, perforation), benefits and alternatives were discussed and the patient agrees to proceed. Cleared for Anesthesia             Chief Complaint:   Colon cancer screening [Z12.11]    History is obtained from the patient          History of Present Illness:   This 58 year old male is being seen at this time for evaluation for colonoscopy..  Maternal uncles with colon.  No complaints.           Past Medical History:   No past medical history on file.         Past Surgical History:     Past Surgical History:   Procedure Laterality Date    ARTHROSCOPY KNEE RT/LT Left 2013    meniscal tear     ARTHROSCOPY KNEE RT/LT Right 10/2016    medial meniscal tear     COLONOSCOPY  02/2017    Q 3 years for polyps    COLONOSCOPY WITH CO2 INSUFFLATION N/A 2/15/2017    Procedure: COLONOSCOPY WITH CO2 INSUFFLATION;  Surgeon: Galileo Lezama DO;  Location: [facility] OR    LAPAROSCOPIC APPENDECTOMY  2010    LAPAROSCOPIC CHOLECYSTECTOMY  2010    SURGICAL HISTORY OF -  Left 1995    corneal transplant X 2 on left eye            Social History:     Social History     Tobacco Use    Smoking status: Light Smoker     Packs/day: .04     Types: Cigarettes    Smokeless tobacco: Never   Substance Use Topics    Alcohol use: Yes     Alcohol/week: 0.0 standard drinks of alcohol     Comment: 1-2 drinks per month             Family History:     Family History   Problem Relation Age of Onset    Prostate Cancer Maternal Uncle         X2            Immunizations:     VACCINE/DOSE   Diptheria   DPT   DTAP   HBIG    Hepatitis A   Hepatitis B   HIB   Influenza   Measles   Meningococcal   MMR   Mumps   Pneumococcal   Polio   Rubella   Small Pox   TDAP   Varicella   Zoster            Allergies:     Allergies   Allergen Reactions    Wellbutrin [Bupropion]             Medications:     Current Facility-Administered Medications   Medication    triamcinolone (KENALOG-40) injection 40 mg     Current Outpatient Medications   Medication Sig    ARIPiprazole (ABILIFY) 30 MG tablet Take 30 mg by mouth daily    CAPLYTA 10.5 MG CAPS     citalopram (CELEXA) 20 MG tablet Take 20 mg by mouth daily    cyclobenzaprine (FLEXERIL) 10 MG tablet 1 tablet at bedtime as needed Orally Once a day    divalproex sodium delayed-release (DEPAKOTE) 500 MG DR tablet Take 500 mg by mouth 3 times daily    fluticasone (FLONASE) 50 MCG/ACT spray Spray 1-2 sprays into both nostrils daily (Patient taking differently: Spray 1-2 sprays into both nostrils daily as needed)    gabapentin (NEURONTIN) 300 MG capsule Take 2 capsules (600 mg) by mouth 2 times daily    mometasone (ELOCON) 0.1 % ointment Apply sparingly to affected area twice daily as needed.  Do not apply to face.    pramipexole (MIRAPEX) 0.5 MG tablet TAKE 1 TABLET(0.5 MG) BY MOUTH THREE TIMES DAILY    psyllium (METAMUCIL) 58.6 % POWD Take one tablespoon daily    QUEtiapine (SEROQUEL) 300 MG tablet Take 1 tablet (300 mg) by mouth At Bedtime    tadalafil (CIALIS) 10 MG tablet 5-10 mg (0.5 -1 tab)as a single dose 30 - 60 minutes prior to anticipated sexual activity; do not take more than once daily.    traMADol (ULTRAM) 50 MG tablet TAKE 1 TABLET BY MOUTH TWICE A DAY 30 DAYS             Review of Systems:   The review of systems was positive for the following findings.  None.  The remainder of the review of systems was unremarkable.          Physical Exam:   All vitals have been reviewed  There were no vitals taken for this visit.  No intake or output data in the 24 hours ending 12/18/23 1033  SHEENT  examination revealed NC/AT, EOMI.  Examination of the chest revealed CTA.  Examination of the heart revealed RRR.  Examination of the abdomen revealed Soft, non tender.  The neuromuscular examination was NL.          Data:   All laboratory data reviewed  No results found for any visits on 12/19/23.  -     Gulshan Braden MD, FACS

## 2023-12-19 ENCOUNTER — ANESTHESIA (OUTPATIENT)
Dept: GASTROENTEROLOGY | Facility: CLINIC | Age: 58
End: 2023-12-19
Payer: COMMERCIAL

## 2023-12-19 ENCOUNTER — HOSPITAL ENCOUNTER (OUTPATIENT)
Facility: CLINIC | Age: 58
Discharge: HOME | End: 2023-12-19
Attending: SPECIALIST | Admitting: SPECIALIST
Payer: COMMERCIAL

## 2023-12-19 VITALS
DIASTOLIC BLOOD PRESSURE: 84 MMHG | OXYGEN SATURATION: 97 % | HEART RATE: 86 BPM | SYSTOLIC BLOOD PRESSURE: 111 MMHG | RESPIRATION RATE: 20 BRPM

## 2023-12-19 LAB — COLONOSCOPY: NORMAL

## 2023-12-19 PROCEDURE — 45385 COLONOSCOPY W/LESION REMOVAL: CPT | Mod: PT | Performed by: SPECIALIST

## 2023-12-19 PROCEDURE — 88305 TISSUE EXAM BY PATHOLOGIST: CPT | Mod: 26 | Performed by: PATHOLOGY

## 2023-12-19 PROCEDURE — 45380 COLONOSCOPY AND BIOPSY: CPT | Performed by: SPECIALIST

## 2023-12-19 PROCEDURE — 370N000017 HC ANESTHESIA TECHNICAL FEE, PER MIN: Performed by: SPECIALIST

## 2023-12-19 PROCEDURE — 88305 TISSUE EXAM BY PATHOLOGIST: CPT | Mod: TC | Performed by: SPECIALIST

## 2023-12-19 PROCEDURE — 250N000009 HC RX 250: Performed by: NURSE ANESTHETIST, CERTIFIED REGISTERED

## 2023-12-19 PROCEDURE — 258N000003 HC RX IP 258 OP 636: Performed by: NURSE ANESTHETIST, CERTIFIED REGISTERED

## 2023-12-19 PROCEDURE — 250N000011 HC RX IP 250 OP 636: Performed by: NURSE ANESTHETIST, CERTIFIED REGISTERED

## 2023-12-19 RX ORDER — PROPOFOL 10 MG/ML
INJECTION, EMULSION INTRAVENOUS PRN
Status: DISCONTINUED | OUTPATIENT
Start: 2023-12-19 | End: 2023-12-19

## 2023-12-19 RX ORDER — LIDOCAINE HYDROCHLORIDE 20 MG/ML
INJECTION, SOLUTION INFILTRATION; PERINEURAL PRN
Status: DISCONTINUED | OUTPATIENT
Start: 2023-12-19 | End: 2023-12-19

## 2023-12-19 RX ORDER — PROPOFOL 10 MG/ML
INJECTION, EMULSION INTRAVENOUS CONTINUOUS PRN
Status: DISCONTINUED | OUTPATIENT
Start: 2023-12-19 | End: 2023-12-19

## 2023-12-19 RX ORDER — LIDOCAINE 40 MG/G
CREAM TOPICAL
Status: DISCONTINUED | OUTPATIENT
Start: 2023-12-19 | End: 2023-12-19 | Stop reason: HOSPADM

## 2023-12-19 RX ORDER — SODIUM CHLORIDE, SODIUM LACTATE, POTASSIUM CHLORIDE, CALCIUM CHLORIDE 600; 310; 30; 20 MG/100ML; MG/100ML; MG/100ML; MG/100ML
INJECTION, SOLUTION INTRAVENOUS CONTINUOUS
Status: DISCONTINUED | OUTPATIENT
Start: 2023-12-19 | End: 2023-12-19 | Stop reason: HOSPADM

## 2023-12-19 RX ADMIN — PROPOFOL 100 MG: 10 INJECTION, EMULSION INTRAVENOUS at 14:48

## 2023-12-19 RX ADMIN — LIDOCAINE HYDROCHLORIDE 50 MG: 20 INJECTION, SOLUTION INFILTRATION; PERINEURAL at 14:48

## 2023-12-19 RX ADMIN — SODIUM CHLORIDE, POTASSIUM CHLORIDE, SODIUM LACTATE AND CALCIUM CHLORIDE: 600; 310; 30; 20 INJECTION, SOLUTION INTRAVENOUS at 14:44

## 2023-12-19 RX ADMIN — PROPOFOL 175 MCG/KG/MIN: 10 INJECTION, EMULSION INTRAVENOUS at 14:50

## 2023-12-19 ASSESSMENT — ACTIVITIES OF DAILY LIVING (ADL): ADLS_ACUITY_SCORE: 35

## 2023-12-19 NOTE — ANESTHESIA CARE TRANSFER NOTE
Patient: Pop Eaton III    Procedure: Procedure(s):  COLONOSCOPY, WITH POLYPECTOMY       Diagnosis: Colon cancer screening [Z12.11]  Diagnosis Additional Information: No value filed.    Anesthesia Type:   MAC     Note:    Oropharynx: oropharynx clear of all foreign objects and spontaneously breathing  Level of Consciousness: drowsy  Oxygen Supplementation: face mask    Independent Airway: airway patency satisfactory and stable  Dentition: dentition unchanged  Vital Signs Stable: post-procedure vital signs reviewed and stable  Report to RN Given: handoff report given  Patient transferred to: PACU    Handoff Report: Identifed the Patient, Identified the Reponsible Provider, Reviewed the pertinent medical history, Discussed the surgical course, Reviewed Intra-OP anesthesia mangement and issues during anesthesia, Set expectations for post-procedure period and Allowed opportunity for questions and acknowledgement of understanding  Vitals:  Vitals Value Taken Time   /81 12/19/23 1513   Temp     Pulse 90 12/19/23 1513   Resp     SpO2 98 % 12/19/23 1514   Vitals shown include unfiled device data.    Electronically Signed By: ADRIEN Berger CRNA  December 19, 2023  3:15 PM

## 2023-12-19 NOTE — DISCHARGE INSTRUCTIONS
[facility]    Home Care Following Endoscopy          Activity:  You have just undergone an endoscopic procedure under sedation.  Do not work or operate machinery (including a car) for at least 12 hours.      Diet:  Return to the diet you were on before your procedure but eat lightly for the first 12-24 hours.  Drink plenty of water.  Resume any regular medications unless otherwise advised by your physician.  Please begin any new medication prescribed as a result of your procedure as directed by your physician.   If you had any biopsy or polyp removed please refrain from aspirin or aspirin products for 2 days.  If on Coumadin please restart as instructed by your physician.   Pain:  You may take Tylenol as needed for pain.  Expected Recovery:  You can expect some mild abdominal fullness and/or discomfort due to the air used to inflate your intestinal tract. I encourage you to walk and attempt to pass air as soon as possible.        Call Your Physician if You Have:  After Colonoscopy:  Worsening persisting abdominal pain which is worse with activity.  Fevers (>101 degrees F), chills or shakes.  Passage of continued blood with bowel movements.   Any questions or concerns about your recovery, please call [phone] or after hours [phone] Nurse Advice Line.    Follow-up Care:  If you had polyps/biopsy tissue sample(s) removed, the polyps/biopsy tissue sample(s) will be sent to pathology.  You should receive a letter in your My Chart with your results, within 1-2 weeks. If you do not participate in My Chart a physical letter will come in the mail in 2-3 weeks.  Please call if you have not received a notification of your results.

## 2023-12-19 NOTE — ANESTHESIA PREPROCEDURE EVALUATION
Anesthesia Pre-Procedure Evaluation    Patient: Pop Eaton III   MRN: [id number] : 1965        Procedure : Procedure(s):  Colonoscopy          History reviewed. No pertinent past medical history.   Past Surgical History:   Procedure Laterality Date     ARTHROSCOPY KNEE RT/LT Left     meniscal tear      ARTHROSCOPY KNEE RT/LT Right 10/2016    medial meniscal tear      COLONOSCOPY  2017    Q 3 years for polyps     COLONOSCOPY WITH CO2 INSUFFLATION N/A 2/15/2017    Procedure: COLONOSCOPY WITH CO2 INSUFFLATION;  Surgeon: Galileo Lezama DO;  Location: [facility] OR     LAPAROSCOPIC APPENDECTOMY       LAPAROSCOPIC CHOLECYSTECTOMY       SURGICAL HISTORY OF -  Left     corneal transplant X 2 on left eye      Allergies   Allergen Reactions     Wellbutrin [Bupropion]       Social History     Tobacco Use     Smoking status: Light Smoker     Packs/day: .04     Types: Cigarettes     Smokeless tobacco: Never   Substance Use Topics     Alcohol use: Yes     Alcohol/week: 0.0 standard drinks of alcohol     Comment: 1-2 drinks per month       Wt Readings from Last 1 Encounters:   23 102.2 kg (225 lb 3.2 oz)        Anesthesia Evaluation   Pt has had prior anesthetic. Type: MAC and General.    No history of anesthetic complications       ROS/MED HX  ENT/Pulmonary: Comment: Hearing loss   Tinnitis    (+) tobacco use, Current use,     Neurologic:  - neg neurologic ROS     Cardiovascular:  - neg cardiovascular ROS   (+) -----Previous cardiac testing   Echo: Date: Results:    Stress Test: Date: Results:    ECG Reviewed: Date:  Results:  SR  Cath: Date: Results:      METS/Exercise Tolerance:     Hematologic:  - neg hematologic  ROS     Musculoskeletal:  - neg musculoskeletal ROS     GI/Hepatic:  - neg GI/hepatic ROS     Renal/Genitourinary:  - neg Renal ROS     Endo:  - neg endo ROS     Psychiatric/Substance Use:     (+) psychiatric history depression     Infectious Disease:  - neg infectious disease  "ROS     Malignancy:       Other:  - neg other ROS          Physical Exam    Airway  airway exam normal      Mallampati: II   TM distance: > 3 FB   Neck ROM: full   Mouth opening: > 3 cm    Respiratory Devices and Support         Dental       (+) Minor Abnormalities - some fillings, tiny chips      Cardiovascular   cardiovascular exam normal       Rhythm and rate: regular and normal     Pulmonary   pulmonary exam normal        breath sounds clear to auscultation           OUTSIDE LABS:  CBC:   Lab Results   Component Value Date    WBC 13.7 (H) 08/07/2018    WBC 16.3 (H) 07/30/2018    HGB 15.9 06/21/2021    HGB 17.5 11/14/2019    HCT 47.9 08/07/2018    HCT 49.4 07/30/2018     08/07/2018     07/30/2018     BMP:   Lab Results   Component Value Date     06/21/2021     02/20/2019    POTASSIUM 3.8 06/21/2021    POTASSIUM 4.4 02/20/2019    CHLORIDE 109 06/21/2021    CHLORIDE 107 02/20/2019    CO2 26 06/21/2021    CO2 29 02/20/2019    BUN 11 06/21/2021    BUN 17 02/20/2019    CR 1.10 06/21/2021    CR 1.07 02/20/2019    GLC 74 06/21/2021     (H) 02/20/2019     COAGS:   Lab Results   Component Value Date    INR 0.84 (L) 02/20/2019     POC: No results found for: \"BGM\", \"HCG\", \"HCGS\"  HEPATIC:   Lab Results   Component Value Date    ALBUMIN 4.7 08/02/2023    PROTTOTAL 7.1 08/02/2023    ALT 46 08/02/2023    AST 41 08/02/2023    ALKPHOS 72 08/02/2023    BILITOTAL 0.3 08/02/2023     OTHER:   Lab Results   Component Value Date    A1C 6.1 (H) 08/02/2023    JANE 8.3 (L) 06/21/2021    MAG 2.5 (H) 07/30/2018    TSH 1.67 07/30/2018    CRP 3.1 05/07/2019       Anesthesia Plan    ASA Status:  2   NPO Status:  NPO Appropriate    Anesthesia Type: MAC.     - Reason for MAC: straight local not clinically adequate      Maintenance: TIVA.        Consents    Anesthesia Plan(s) and associated risks, benefits, and realistic alternatives discussed. Questions answered and patient/representative(s) expressed " understanding.    - Discussed:     - Discussed with:  Patient    Use of blood products discussed: No .     Postoperative Care            Comments:    Other Comments: The risks and benefits of anesthesia, and the alternatives where applicable, have been discussed with the patient, and they wish to proceed.           ADRIEN Berger CRNA    I have reviewed the pertinent notes and labs in the chart from the past 30 days and (re)examined the patient.  Any updates or changes from those notes are reflected in this note.

## 2023-12-20 NOTE — ANESTHESIA POSTPROCEDURE EVALUATION
Patient: Pop Eaton III    Procedure: Procedure(s):  COLONOSCOPY, WITH POLYPECTOMY       Anesthesia Type:  MAC    Note:  Disposition: Outpatient   Postop Pain Control: Uneventful            Sign Out: Well controlled pain   PONV: No   Neuro/Psych: Uneventful            Sign Out: Acceptable/Baseline neuro status   Airway/Respiratory: Uneventful            Sign Out: Acceptable/Baseline resp. status   CV/Hemodynamics: Uneventful            Sign Out: Acceptable CV status   Other NRE: NONE   DID A NON-ROUTINE EVENT OCCUR? No    Event details/Postop Comments:  Pt was happy with anesthesia care.  No complications.  I will follow up with the pt if needed.           Last vitals:  Vitals Value Taken Time   /84 12/19/23 1530   Temp     Pulse 86 12/19/23 1530   Resp 20 12/19/23 1530   SpO2 97 % 12/19/23 1530       Electronically Signed By: ADRIEN Machado CRNA  December 19, 2023  6:40 PM

## 2023-12-21 LAB
PATH REPORT.COMMENTS IMP SPEC: NORMAL
PATH REPORT.COMMENTS IMP SPEC: NORMAL
PATH REPORT.FINAL DX SPEC: NORMAL
PATH REPORT.GROSS SPEC: NORMAL
PATH REPORT.MICROSCOPIC SPEC OTHER STN: NORMAL
PATH REPORT.RELEVANT HX SPEC: NORMAL
PHOTO IMAGE: NORMAL

## 2023-12-27 DIAGNOSIS — G25.81 RESTLESS LEGS SYNDROME (RLS): ICD-10-CM

## 2023-12-27 RX ORDER — PRAMIPEXOLE DIHYDROCHLORIDE 0.5 MG/1
TABLET ORAL
Qty: 30 TABLET | Refills: 2 | Status: SHIPPED | OUTPATIENT
Start: 2023-12-27 | End: 2024-01-24

## 2023-12-28 ENCOUNTER — TRANSFERRED RECORDS (OUTPATIENT)
Dept: HEALTH INFORMATION MANAGEMENT | Facility: CLINIC | Age: 58
End: 2023-12-28
Payer: COMMERCIAL

## 2024-01-24 DIAGNOSIS — G25.81 RESTLESS LEGS SYNDROME (RLS): ICD-10-CM

## 2024-01-24 RX ORDER — PRAMIPEXOLE DIHYDROCHLORIDE 0.5 MG/1
TABLET ORAL
Qty: 270 TABLET | Refills: 1 | Status: SHIPPED | OUTPATIENT
Start: 2024-01-24 | End: 2024-03-27

## 2024-02-01 ENCOUNTER — TRANSFERRED RECORDS (OUTPATIENT)
Dept: HEALTH INFORMATION MANAGEMENT | Facility: CLINIC | Age: 59
End: 2024-02-01
Payer: COMMERCIAL

## 2024-02-12 ENCOUNTER — DOCUMENTATION ONLY (OUTPATIENT)
Dept: LAB | Facility: CLINIC | Age: 59
End: 2024-02-12

## 2024-02-12 ENCOUNTER — ANCILLARY PROCEDURE (OUTPATIENT)
Dept: GENERAL RADIOLOGY | Facility: CLINIC | Age: 59
End: 2024-02-12
Payer: COMMERCIAL

## 2024-02-12 ENCOUNTER — OFFICE VISIT (OUTPATIENT)
Dept: INTERNAL MEDICINE | Facility: CLINIC | Age: 59
End: 2024-02-12
Payer: COMMERCIAL

## 2024-02-12 VITALS
DIASTOLIC BLOOD PRESSURE: 88 MMHG | BODY MASS INDEX: 30.77 KG/M2 | SYSTOLIC BLOOD PRESSURE: 129 MMHG | HEART RATE: 70 BPM | RESPIRATION RATE: 18 BRPM | TEMPERATURE: 97.4 F | WEIGHT: 203 LBS | HEIGHT: 68 IN | OXYGEN SATURATION: 97 %

## 2024-02-12 DIAGNOSIS — R11.2 NAUSEA AND VOMITING, UNSPECIFIED VOMITING TYPE: ICD-10-CM

## 2024-02-12 DIAGNOSIS — Z11.4 SCREENING FOR HIV (HUMAN IMMUNODEFICIENCY VIRUS): ICD-10-CM

## 2024-02-12 DIAGNOSIS — Z11.1 SCREENING EXAMINATION FOR PULMONARY TUBERCULOSIS: ICD-10-CM

## 2024-02-12 DIAGNOSIS — Z11.1 SCREENING FOR TUBERCULOSIS: ICD-10-CM

## 2024-02-12 DIAGNOSIS — K58.0 IRRITABLE BOWEL SYNDROME WITH DIARRHEA: ICD-10-CM

## 2024-02-12 DIAGNOSIS — Z13.1 SCREENING FOR DIABETES MELLITUS: ICD-10-CM

## 2024-02-12 DIAGNOSIS — Z11.59 NEED FOR HEPATITIS C SCREENING TEST: ICD-10-CM

## 2024-02-12 DIAGNOSIS — R11.2 NAUSEA AND VOMITING, UNSPECIFIED VOMITING TYPE: Primary | ICD-10-CM

## 2024-02-12 LAB — HBA1C MFR BLD: 5.8 % (ref 0–5.6)

## 2024-02-12 PROCEDURE — 36415 COLL VENOUS BLD VENIPUNCTURE: CPT

## 2024-02-12 PROCEDURE — 86481 TB AG RESPONSE T-CELL SUSP: CPT

## 2024-02-12 PROCEDURE — 83690 ASSAY OF LIPASE: CPT

## 2024-02-12 PROCEDURE — 74019 RADEX ABDOMEN 2 VIEWS: CPT | Mod: TC | Performed by: RADIOLOGY

## 2024-02-12 PROCEDURE — 86803 HEPATITIS C AB TEST: CPT

## 2024-02-12 PROCEDURE — 99214 OFFICE O/P EST MOD 30 MIN: CPT

## 2024-02-12 PROCEDURE — 83036 HEMOGLOBIN GLYCOSYLATED A1C: CPT

## 2024-02-12 PROCEDURE — 80053 COMPREHEN METABOLIC PANEL: CPT

## 2024-02-12 RX ORDER — ONDANSETRON 4 MG/1
4 TABLET, ORALLY DISINTEGRATING ORAL EVERY 6 HOURS PRN
Qty: 30 TABLET | Refills: 1 | Status: SHIPPED | OUTPATIENT
Start: 2024-02-12 | End: 2024-03-27

## 2024-02-12 ASSESSMENT — ENCOUNTER SYMPTOMS: VOMITING: 1

## 2024-02-12 NOTE — PROGRESS NOTES
Pop Eaton III has an upcoming lab appointment:    Future Appointments   Date Time Provider Department Center   2/12/2024  2:30 PM GUS RHODES/LPN [facility]     Patient is scheduled for the following lab(s): Patient needs TB test I draw him can you put orders in there please.    There is no order available. Please review and place either future orders or HMPO (Review of Health Maintenance Protocol Orders), as appropriate.    Health Maintenance Due   Topic    HIV SCREENING     HEPATITIS C SCREENING      June Suarez

## 2024-02-12 NOTE — PROGRESS NOTES
Assessment & Plan     (R11.2) Nausea and vomiting, unspecified vomiting type (primary encounter diagnosis)  Comment: Sean was seen in clinic today for vomiting. Patient denies dizziness, feeling off balance, or pain in ears. Did have COVID several weeks ago and did have some GI symptoms. Symptoms have gotten worse and patient has been having vomiting every morning for over a month. Patient did have pain in both lower quadrants of abdomen and in the upper right quadrant with palpation. Discussed using Zofran to help with symptom management. Discussed X ray to rule out bowel obstruction and abdominal ultrasound to rule out diverticulitis or fatty liver.   Plan: ondansetron (ZOFRAN ODT) 4 MG ODT tab,         Comprehensive metabolic panel (BMP + Alb, Alk         Phos, ALT, AST, Total. Bili, TP),   Pending     US Abdomen Complete, Future   XR Abdomen 2 Views, Normal no signs of obstruction     (R11.2) Irritable bowel syndrome with diarrhea  Comment: Chronic has multiple stools in a day. Not well controlled.     (Z13.1) Screening for diabetes mellitus  Comment: Discussed screening for diabetes   Plan: Hemoglobin A1c         Result 5.8     (Z11.59) Need for hepatitis C screening test  Comment: Discussed screening  Plan: Pending    (Z11.1) Screening for tuberculosis  Comment: Patient needed to be screened for work  Plan: Quantiferon TB Gold Plus        Pending         Subjective   Chip is a 58 year old, presenting for the following health issues:  Vomiting        2/12/2024    11:09 AM   Additional Questions   Roomed by Emely GUERRERO     Vomiting              Vomiting -2-3 hours after waking up and throughout the day but can control that       Review of Systems  Constitutional, HEENT, cardiovascular, pulmonary, gi and gu systems are negative, except as otherwise noted.      Objective    /88 (BP Location: Right arm, Patient Position: Sitting, Cuff Size: Adult Regular)   Pulse 70   Temp 97.4  F (36.3  C) (Temporal)    "Resp 18   Ht 1.727 m (5' 8\")   Wt 92.1 kg (203 lb)   SpO2 97%   BMI 30.87 kg/m    Body mass index is 30.87 kg/m .  Physical Exam  HENT:      Nose: Nose normal.      Mouth/Throat:      Mouth: Mucous membranes are moist.   Eyes:      Pupils: Pupils are equal, round, and reactive to light.   Cardiovascular:      Rate and Rhythm: Normal rate and regular rhythm.      Pulses: Normal pulses.      Heart sounds: Normal heart sounds. No murmur heard.     No friction rub.   Pulmonary:      Effort: Pulmonary effort is normal. No respiratory distress.      Breath sounds: Normal breath sounds. No stridor. No wheezing or rhonchi.   Abdominal:      General: Bowel sounds are normal. There is distension.      Tenderness: There is abdominal tenderness.   Musculoskeletal:         General: No swelling, tenderness, deformity or signs of injury. Normal range of motion.   Skin:     General: Skin is warm.      Coloration: Skin is not jaundiced or pale.      Findings: No bruising or erythema.   Neurological:      General: No focal deficit present.      Mental Status: He is alert and oriented to person, place, and time.   Psychiatric:         Mood and Affect: Mood normal.         Behavior: Behavior normal.         Thought Content: Thought content normal.         Judgment: Judgment normal.                    Signed Electronically by: ADRIEN Pineda CNP    "

## 2024-02-13 LAB
ALBUMIN SERPL BCG-MCNC: 4.6 G/DL (ref 3.5–5.2)
ALP SERPL-CCNC: 76 U/L (ref 40–150)
ALT SERPL W P-5'-P-CCNC: 86 U/L (ref 0–70)
ANION GAP SERPL CALCULATED.3IONS-SCNC: 11 MMOL/L (ref 7–15)
AST SERPL W P-5'-P-CCNC: 44 U/L (ref 0–45)
BILIRUB SERPL-MCNC: 0.5 MG/DL
BUN SERPL-MCNC: 7.9 MG/DL (ref 6–20)
CALCIUM SERPL-MCNC: 9.3 MG/DL (ref 8.6–10)
CHLORIDE SERPL-SCNC: 103 MMOL/L (ref 98–107)
CREAT SERPL-MCNC: 1.07 MG/DL (ref 0.67–1.17)
DEPRECATED HCO3 PLAS-SCNC: 25 MMOL/L (ref 22–29)
EGFRCR SERPLBLD CKD-EPI 2021: 81 ML/MIN/1.73M2
GLUCOSE SERPL-MCNC: 96 MG/DL (ref 70–99)
HCV AB SERPL QL IA: NONREACTIVE
POTASSIUM SERPL-SCNC: 4.4 MMOL/L (ref 3.4–5.3)
PROT SERPL-MCNC: 6.9 G/DL (ref 6.4–8.3)
SODIUM SERPL-SCNC: 139 MMOL/L (ref 135–145)

## 2024-02-14 ENCOUNTER — ANCILLARY PROCEDURE (OUTPATIENT)
Dept: ULTRASOUND IMAGING | Facility: CLINIC | Age: 59
End: 2024-02-14
Payer: COMMERCIAL

## 2024-02-14 DIAGNOSIS — K85.90 ACUTE PANCREATITIS, UNSPECIFIED COMPLICATION STATUS, UNSPECIFIED PANCREATITIS TYPE: Primary | ICD-10-CM

## 2024-02-14 DIAGNOSIS — K76.0 FATTY LIVER DISEASE, NONALCOHOLIC: ICD-10-CM

## 2024-02-14 LAB
LIPASE SERPL-CCNC: 33 U/L (ref 13–60)
QUANTIFERON MITOGEN: 10 IU/ML
QUANTIFERON NIL TUBE: 0.03 IU/ML
QUANTIFERON TB1 TUBE: 0.02 IU/ML
QUANTIFERON TB2 TUBE: 0.02

## 2024-02-14 PROCEDURE — 76700 US EXAM ABDOM COMPLETE: CPT | Mod: TC | Performed by: RADIOLOGY

## 2024-02-14 NOTE — PROGRESS NOTES
Patient has been dealing with vomiting in the am for over a month. Ultrasound showed concerns for pancreatitis and fatty liver disease. Referring  to GI for further assessment and treatment plan.

## 2024-02-15 LAB
GAMMA INTERFERON BACKGROUND BLD IA-ACNC: 0.03 IU/ML
M TB IFN-G BLD-IMP: NEGATIVE
M TB IFN-G CD4+ BCKGRND COR BLD-ACNC: 9.97 IU/ML
MITOGEN IGNF BCKGRD COR BLD-ACNC: -0.01 IU/ML
MITOGEN IGNF BCKGRD COR BLD-ACNC: -0.01 IU/ML

## 2024-02-16 ENCOUNTER — TELEPHONE (OUTPATIENT)
Dept: GASTROENTEROLOGY | Facility: CLINIC | Age: 59
End: 2024-02-16
Payer: COMMERCIAL

## 2024-02-16 NOTE — TELEPHONE ENCOUNTER
[facility] Call Center    Phone Message    May a detailed message be left on voicemail: yes     Reason for Call: Other: New Gen GI patient // no triage notes entered //Patient was scheduled for Hep // writer called back line unsuccessfully // please triage and call patient to schedule for DX Pancreatitis     Action Taken: Message routed to:  [facility] GI    Travel Screening: Not Applicable

## 2024-02-19 NOTE — TELEPHONE ENCOUNTER
Writer called and left voice message for Pt. Called Pt to help get them scheduled for a GI appointment. Writer left call back number.    Writer will send Pt a Brammot message.

## 2024-02-29 NOTE — CONFIDENTIAL NOTE
DIAGNOSIS:     Acute pancreatitis, unspecified complication status, unspecified pancreatitis type   Fatty liver disease, nonalcoholic      Appt Date:   04.17.2024    NOTES STATUS DETAILS   OFFICE NOTE from referring provider Internal 02.14.2024 Fela Babin APRN CNP    OFFICE NOTES from other specialists     DISCHARGE SUMMARY from hospital     MEDICATION LIST Internal    LIVER BIOSPY (IF APPLICABLE)      PATHOLOGY REPORTS      IMAGING     ENDOSCOPY (IF AVAILABLE)     COLONOSCOPY (IF AVAILABLE) Internal 12.19.2023   ULTRASOUND LIVER Internal 02.14.2024 US Abd Complete   CT OF ABDOMEN     MRI OF LIVER     FIBROSCAN, US ELASTOGRAPHY, FIBROSIS SCAN, MR ELASTOGRAPHY     LABS     HEPATIC PANEL (LIVER PANEL) Internal 08.02.2023   BASIC METABOLIC PANEL Internal 06.21.2021   COMPLETE METABOLIC PANEL     COMPLETE BLOOD COUNT (CBC)     INTERNATIONAL NORMALIZED RATIO (INR)     HEPATITIS C ANTIBODY Internal 02.12.2024   HEPATITIS C VIRAL LOAD/PCR     HEPATITIS C GENOTYPE     HEPATITIS B SURFACE ANTIGEN     HEPATITIS B SURFACE ANTIBODY     HEPATITIS B DNA QUANT LEVEL     HEPATITIS B CORE ANTIBODY

## 2024-03-06 NOTE — TELEPHONE ENCOUNTER
REFERRAL INFORMATION:  Referring Provider:  Fela Babin APRN CNP   Referring Clinic:  [facility]   Reason for Visit/Diagnosis:    Acute pancreatitis, unspecified complication status, unspecified pancreatitis type   Fatty liver disease, nonalcoholic        FUTURE VISIT INFORMATION:  Appointment Date: 4/4/2024  Appointment Time:      NOTES STATUS DETAILS   OFFICE NOTE from Referring Provider Internal 2/12/2024 OV with JANET Babin   OFFICE NOTE from Other Specialist N/A    HOSPITAL DISCHARGE SUMMARY/  ED VISITS N/A    OPERATIVE REPORT N/A    MEDICATION LIST Internal         ENDOSCOPY  N/A    COLONOSCOPY N/A 12/19/2023   IMAGING (CT, MRI, EGD, MRCP, Small Bowel Follow Through/SBT, MR/CT Enterography) Internal 2/14/2023 US ABD   2/14/2024 XR ABD

## 2024-03-19 ENCOUNTER — TRANSFERRED RECORDS (OUTPATIENT)
Dept: HEALTH INFORMATION MANAGEMENT | Facility: CLINIC | Age: 59
End: 2024-03-19
Payer: COMMERCIAL

## 2024-03-27 ENCOUNTER — OFFICE VISIT (OUTPATIENT)
Dept: INTERNAL MEDICINE | Facility: CLINIC | Age: 59
End: 2024-03-27
Payer: COMMERCIAL

## 2024-03-27 VITALS
SYSTOLIC BLOOD PRESSURE: 124 MMHG | TEMPERATURE: 98.2 F | WEIGHT: 199 LBS | HEART RATE: 86 BPM | DIASTOLIC BLOOD PRESSURE: 82 MMHG | HEIGHT: 68 IN | OXYGEN SATURATION: 98 % | BODY MASS INDEX: 30.16 KG/M2 | RESPIRATION RATE: 16 BRPM

## 2024-03-27 DIAGNOSIS — Z02.89 PAIN MANAGEMENT CONTRACT AGREEMENT: ICD-10-CM

## 2024-03-27 DIAGNOSIS — M47.24 OSTEOARTHRITIS OF SPINE WITH RADICULOPATHY, THORACIC REGION: ICD-10-CM

## 2024-03-27 DIAGNOSIS — K76.0 FATTY LIVER DISEASE, NONALCOHOLIC: ICD-10-CM

## 2024-03-27 DIAGNOSIS — Z11.4 SCREENING FOR HIV (HUMAN IMMUNODEFICIENCY VIRUS): ICD-10-CM

## 2024-03-27 DIAGNOSIS — K85.90 ACUTE PANCREATITIS, UNSPECIFIED COMPLICATION STATUS, UNSPECIFIED PANCREATITIS TYPE: ICD-10-CM

## 2024-03-27 DIAGNOSIS — A69.20 LYME DISEASE: ICD-10-CM

## 2024-03-27 DIAGNOSIS — D72.9 ABNORMAL WHITE BLOOD CELL (WBC) COUNT: ICD-10-CM

## 2024-03-27 DIAGNOSIS — E55.9 VITAMIN D DEFICIENCY: ICD-10-CM

## 2024-03-27 DIAGNOSIS — R11.14 BILIOUS VOMITING WITH NAUSEA: Primary | ICD-10-CM

## 2024-03-27 LAB
ALBUMIN SERPL BCG-MCNC: 4.6 G/DL (ref 3.5–5.2)
ALP SERPL-CCNC: 102 U/L (ref 40–150)
ALT SERPL W P-5'-P-CCNC: 72 U/L (ref 0–70)
AMYLASE SERPL-CCNC: 51 U/L (ref 28–100)
AST SERPL W P-5'-P-CCNC: 50 U/L (ref 0–45)
BASOPHILS # BLD AUTO: 0.1 10E3/UL (ref 0–0.2)
BASOPHILS NFR BLD AUTO: 0 %
BILIRUB DIRECT SERPL-MCNC: <0.2 MG/DL (ref 0–0.3)
BILIRUB SERPL-MCNC: 0.4 MG/DL
CREAT UR-MCNC: 82 MG/DL
EOSINOPHIL # BLD AUTO: 0 10E3/UL (ref 0–0.7)
EOSINOPHIL NFR BLD AUTO: 0 %
ERYTHROCYTE [DISTWIDTH] IN BLOOD BY AUTOMATED COUNT: 15.7 % (ref 10–15)
HCT VFR BLD AUTO: 48.1 % (ref 40–53)
HGB BLD-MCNC: 16.7 G/DL (ref 13.3–17.7)
HIV 1+2 AB+HIV1 P24 AG SERPL QL IA: NONREACTIVE
IMM GRANULOCYTES # BLD: 0.1 10E3/UL
IMM GRANULOCYTES NFR BLD: 0 %
LIPASE SERPL-CCNC: 23 U/L (ref 13–60)
LYMPHOCYTES # BLD AUTO: 2.7 10E3/UL (ref 0.8–5.3)
LYMPHOCYTES NFR BLD AUTO: 19 %
MCH RBC QN AUTO: 27.2 PG (ref 26.5–33)
MCHC RBC AUTO-ENTMCNC: 34.7 G/DL (ref 31.5–36.5)
MCV RBC AUTO: 79 FL (ref 78–100)
MONOCYTES # BLD AUTO: 0.8 10E3/UL (ref 0–1.3)
MONOCYTES NFR BLD AUTO: 6 %
NEUTROPHILS # BLD AUTO: 10.2 10E3/UL (ref 1.6–8.3)
NEUTROPHILS NFR BLD AUTO: 74 %
PLATELET # BLD AUTO: 230 10E3/UL (ref 150–450)
PROT SERPL-MCNC: 7.4 G/DL (ref 6.4–8.3)
RBC # BLD AUTO: 6.13 10E6/UL (ref 4.4–5.9)
VIT D+METAB SERPL-MCNC: 26 NG/ML (ref 20–50)
WBC # BLD AUTO: 13.9 10E3/UL (ref 4–11)

## 2024-03-27 PROCEDURE — 85025 COMPLETE CBC W/AUTO DIFF WBC: CPT

## 2024-03-27 PROCEDURE — 36415 COLL VENOUS BLD VENIPUNCTURE: CPT

## 2024-03-27 PROCEDURE — 82306 VITAMIN D 25 HYDROXY: CPT

## 2024-03-27 PROCEDURE — G0481 DRUG TEST DEF 8-14 CLASSES: HCPCS

## 2024-03-27 PROCEDURE — G2211 COMPLEX E/M VISIT ADD ON: HCPCS

## 2024-03-27 PROCEDURE — 87389 HIV-1 AG W/HIV-1&-2 AB AG IA: CPT

## 2024-03-27 PROCEDURE — 86618 LYME DISEASE ANTIBODY: CPT

## 2024-03-27 PROCEDURE — 83690 ASSAY OF LIPASE: CPT

## 2024-03-27 PROCEDURE — 80076 HEPATIC FUNCTION PANEL: CPT

## 2024-03-27 PROCEDURE — 82150 ASSAY OF AMYLASE: CPT

## 2024-03-27 PROCEDURE — 99215 OFFICE O/P EST HI 40 MIN: CPT

## 2024-03-27 RX ORDER — ONDANSETRON 8 MG/1
8 TABLET, ORALLY DISINTEGRATING ORAL EVERY 8 HOURS PRN
Qty: 20 TABLET | Refills: 1 | Status: SHIPPED | OUTPATIENT
Start: 2024-03-27

## 2024-03-27 RX ORDER — OXYCODONE AND ACETAMINOPHEN 5; 325 MG/1; MG/1
1 TABLET ORAL EVERY 6 HOURS PRN
Qty: 12 TABLET | Refills: 0 | Status: SHIPPED | OUTPATIENT
Start: 2024-03-27 | End: 2024-03-30

## 2024-03-27 ASSESSMENT — ENCOUNTER SYMPTOMS: NAUSEA: 1

## 2024-03-27 ASSESSMENT — PATIENT HEALTH QUESTIONNAIRE - PHQ9
SUM OF ALL RESPONSES TO PHQ QUESTIONS 1-9: 17
10. IF YOU CHECKED OFF ANY PROBLEMS, HOW DIFFICULT HAVE THESE PROBLEMS MADE IT FOR YOU TO DO YOUR WORK, TAKE CARE OF THINGS AT HOME, OR GET ALONG WITH OTHER PEOPLE: SOMEWHAT DIFFICULT
SUM OF ALL RESPONSES TO PHQ QUESTIONS 1-9: 17

## 2024-03-27 NOTE — PROGRESS NOTES
Assessment & Plan     (R11.14) Bilious vomiting with nausea  (primary encounter diagnosis)  Comment: Patient continues to deal with episodes of nausea and vomiting. Discussed medication management for symptom relief. Has been assessed in ED recently CT showed no new concerns.  Plan: ondansetron (ZOFRAN ODT) 8 MG ODT tab        Prescription sent to pharmacy    (Z11.4) Screening for HIV (human immunodeficiency virus)  Comment: Discussed recommendation to screen  Plan: HIV Antigen Antibody Combo        Pending    (Z02.89) Pain management contract agreement  Comment: Patient is seen by [facility] for pain management due to chronic neck issue. Discussed drug panel screen they had placed.  Plan: [id number] - Urine Drug Confirmation Panel         (Comprehensive)        Pending    (K76.0) Fatty liver disease, nonalcoholic  Comment: Patient has been found to have fatty liver disease. Recent CT showed no worsening in condition Discussed checking labs this visit.   Plan: Hepatic panel (Albumin, ALT, AST, Bili, Alk         Phos, TP)        Pending    (K85.90) Acute pancreatitis, unspecified complication status, unspecified pancreatitis type  Comment: Discussed checking pancreatic enzymes due to symptoms they were noted to be WNL at last check but discussed rechecking this visit due to pain that continues on URQ. Patient is rating pain at a 7/10 to 8/10. Discussed 1 time prescribing of pain medication. Has GI appointment in 2 weeks. No other medications have been helpful.   Plan: oxyCODONE-acetaminophen (PERCOCET) 5-325 MG         tablet,   Prescription sent to pharmacy   Amylase, Lipase        Pending     (E55.9) Vitamin D deficiency  Comment: Discussed checking lab  Plan: Vitamin D Deficiency        Pending     (D72.9) Abnormal white blood cell (WBC) count  Comment: Discussed checking lab  Plan: CBC with platelets and differential        Pending     (A69.20) Lyme disease  Comment: Patient has not been checked for Lymes disease  discussed checking due to pain and unclear etiology  Plan: Lyme Disease Total Abs Bld with Reflex to Confirm CLIA        Pending    (M47.24) Osteoarthritis of spine with radiculopathy, thoracic region  Comment: Noted on Lung Cancer screening that their was an issue at T7. Small sclerotic focus in the T7 vertebral body, likely a benign fibro-osseous abnormality. Discussed evaluating further as this was an incidental finding.  Plan: CT Thoracic Spine w/o & w Contrast        Future     Review of external notes as documented elsewhere in note  45 minutes spent by me on the date of the encounter doing chart review, history and exam, documentation and further activities per the note        FURTHER TESTING:       - CT thoracic    Subjective   Chip is a 58 year old, presenting for the following health issues:  Nausea and Abdominal Pain      3/27/2024    11:02 AM   Additional Questions   Roomed by Emely GUERRERO     Nausea  Associated symptoms include nausea.   History of Present Illness       Reason for visit:  Stomach pain and    He eats 0-1 servings of fruits and vegetables daily.He consumes 1 sweetened beverage(s) daily.He exercises with enough effort to increase his heart rate 20 to 29 minutes per day.  He exercises with enough effort to increase his heart rate 4 days per week.   He is taking medications regularly.                 Review of Systems  Constitutional, HEENT, cardiovascular, pulmonary, gi and gu systems are negative, except as otherwise noted.      Objective    There were no vitals taken for this visit.  There is no height or weight on file to calculate BMI.  Physical Exam   GENERAL: alert and no distress  NECK: no adenopathy, no asymmetry, masses, or scars  RESP: lungs clear to auscultation - no rales, rhonchi or wheezes  CV: regular rate and rhythm, normal S1 S2, no S3 or S4, no murmur, click or rub, no peripheral edema  ABDOMEN: soft, tender, pain and guarding noted on palpation, hyperactive bowel sounds  noted in all 4 quadrants  MS: no gross musculoskeletal defects noted, no edema    Results for orders placed or performed in visit on 03/27/24   Hepatic panel (Albumin, ALT, AST, Bili, Alk Phos, TP)     Status: Abnormal   Result Value Ref Range    Protein Total 7.4 6.4 - 8.3 g/dL    Albumin 4.6 3.5 - 5.2 g/dL    Bilirubin Total 0.4 <=1.2 mg/dL    Alkaline Phosphatase 102 40 - 150 U/L    AST 50 (H) 0 - 45 U/L    ALT 72 (H) 0 - 70 U/L    Bilirubin Direct <0.20 0.00 - 0.30 mg/dL   Amylase     Status: Normal   Result Value Ref Range    Amylase 51 28 - 100 U/L   Lipase     Status: Normal   Result Value Ref Range    Lipase 23 13 - 60 U/L   Vitamin D Deficiency     Status: Normal   Result Value Ref Range    Vitamin D, Total (25-Hydroxy) 26 20 - 50 ng/mL    Narrative    Season, race, dietary intake, and treatment affect the concentration of 25-hydroxy-Vitamin D. Values may decrease during winter months and increase during summer months.    Vitamin D determination is routinely performed by an immunoassay specific for 25 hydroxyvitamin D3.  If an individual is on vitamin D2(ergocalciferol) supplementation, please specify 25 OH vitamin D2 and D3 level determination by LCMSMS test VITD23.     Urine Creatinine for Drug Screen Panel     Status: None   Result Value Ref Range    Creatinine Urine for Drug Screen 82 mg/dL   CBC with platelets and differential     Status: Abnormal   Result Value Ref Range    WBC Count 13.9 (H) 4.0 - 11.0 10e3/uL    RBC Count 6.13 (H) 4.40 - 5.90 10e6/uL    Hemoglobin 16.7 13.3 - 17.7 g/dL    Hematocrit 48.1 40.0 - 53.0 %    MCV 79 78 - 100 fL    MCH 27.2 26.5 - 33.0 pg    MCHC 34.7 31.5 - 36.5 g/dL    RDW 15.7 (H) 10.0 - 15.0 %    Platelet Count 230 150 - 450 10e3/uL    % Neutrophils 74 %    % Lymphocytes 19 %    % Monocytes 6 %    % Eosinophils 0 %    % Basophils 0 %    % Immature Granulocytes 0 %    Absolute Neutrophils 10.2 (H) 1.6 - 8.3 10e3/uL    Absolute Lymphocytes 2.7 0.8 - 5.3 10e3/uL     Absolute Monocytes 0.8 0.0 - 1.3 10e3/uL    Absolute Eosinophils 0.0 0.0 - 0.7 10e3/uL    Absolute Basophils 0.1 0.0 - 0.2 10e3/uL    Absolute Immature Granulocytes 0.1 <=0.4 10e3/uL   [id number] - Urine Drug Confirmation Panel (Comprehensive)     Status: None (In process)    Narrative    The following orders were created for panel order [id number] - Urine Drug Confirmation Panel (Comprehensive).  Procedure                               Abnormality         Status                     ---------                               -----------         ------                     Urine Drug Confirmation ...[[id number]]                      In process                 Urine Creatinine for Jaret...[[id number]]                      Final result                 Please view results for these tests on the individual orders.   CBC with platelets and differential     Status: Abnormal    Narrative    The following orders were created for panel order CBC with platelets and differential.  Procedure                               Abnormality         Status                     ---------                               -----------         ------                     CBC with platelets and d...[[id number]]  Abnormal            Final result                 Please view results for these tests on the individual orders.           Signed Electronically by: ADRIEN Pineda CNP

## 2024-03-28 LAB — B BURGDOR IGG+IGM SER QL: 0.09

## 2024-03-29 LAB
N-NORTRAMADOL/CREAT UR CFM: 5122 NG/MG {CREAT}
O-NORTRAMADOL UR CFM-MCNC: 4200 NG/ML
TRAMADOL CTO UR CFM-MCNC: 964 NG/ML
TRAMADOL/CREAT UR: 1176 NG/MG {CREAT}

## 2024-04-04 ENCOUNTER — PRE VISIT (OUTPATIENT)
Dept: GASTROENTEROLOGY | Facility: CLINIC | Age: 59
End: 2024-04-04

## 2024-04-04 ENCOUNTER — OFFICE VISIT (OUTPATIENT)
Dept: GASTROENTEROLOGY | Facility: CLINIC | Age: 59
End: 2024-04-04
Payer: COMMERCIAL

## 2024-04-04 VITALS
HEART RATE: 87 BPM | WEIGHT: 199.6 LBS | OXYGEN SATURATION: 95 % | BODY MASS INDEX: 30.25 KG/M2 | HEIGHT: 68 IN | DIASTOLIC BLOOD PRESSURE: 78 MMHG | SYSTOLIC BLOOD PRESSURE: 135 MMHG

## 2024-04-04 DIAGNOSIS — K52.9 CHRONIC DIARRHEA: Primary | ICD-10-CM

## 2024-04-04 DIAGNOSIS — R14.0 ABDOMINAL BLOATING: ICD-10-CM

## 2024-04-04 DIAGNOSIS — R11.2 NAUSEA AND VOMITING, UNSPECIFIED VOMITING TYPE: ICD-10-CM

## 2024-04-04 DIAGNOSIS — R10.12 LUQ ABDOMINAL PAIN: ICD-10-CM

## 2024-04-04 PROCEDURE — 99204 OFFICE O/P NEW MOD 45 MIN: CPT | Performed by: PHYSICIAN ASSISTANT

## 2024-04-04 PROCEDURE — 82784 ASSAY IGA/IGD/IGG/IGM EACH: CPT | Performed by: PHYSICIAN ASSISTANT

## 2024-04-04 PROCEDURE — 36415 COLL VENOUS BLD VENIPUNCTURE: CPT | Performed by: PHYSICIAN ASSISTANT

## 2024-04-04 PROCEDURE — 86364 TISS TRNSGLTMNASE EA IG CLAS: CPT | Performed by: PHYSICIAN ASSISTANT

## 2024-04-04 RX ORDER — OXYCODONE AND ACETAMINOPHEN 5; 325 MG/1; MG/1
1 TABLET ORAL DAILY
COMMUNITY

## 2024-04-04 ASSESSMENT — PAIN SCALES - GENERAL: PAINLEVEL: EXTREME PAIN (8)

## 2024-04-04 NOTE — NURSING NOTE
"Chief Complaint   Patient presents with    New Patient     New consult for nausea, vomiting, abdominal bloating, abdominal pain, and chronic diarrhea.     He requests these members of his care team be copied on today's visit information:  PCP: Fela Babin    Vitals:    04/04/24 1549   BP: 135/78   BP Location: Left arm   Patient Position: Sitting   Cuff Size: Adult Regular   Pulse: 87   SpO2: 95%   Weight: 90.5 kg (199 lb 9.6 oz)   Height: 1.727 m (5' 8\")     Body mass index is 30.35 kg/m .    Medications were reconciled.        Azucena Trejo CMA    "

## 2024-04-04 NOTE — LETTER
4/4/2024         RE: Pop Eaton III  [address] [facility] [address]        Dear Colleague,    Thank you for referring your patient, Pop Eaton III, to the [facility]. Please see a copy of my visit note below.    NEW PATIENT GI CLINIC VISIT    CC/REFERRING MD:  No ref. provider found  REASON FOR CONSULTATION:   No ref. provider found for   Chief Complaint   Patient presents with     New Patient     New consult for nausea, vomiting, abdominal bloating, abdominal pain, and chronic diarrhea.       ASSESSMENT/PLAN: Patient here for evaluation of multiple concerns, including more recent development of nausea, vomiting, epigastric and left upper quadrant pain, as well as more longstanding abdominal bloating and chronic loose stool, which has previously been described as IBS.  We spent some time reviewing his recent workup.  He has been having some relief with Zofran but did not have any relief with PPI therapy.  I recommended proceeding with EGD to assess for reflux esophagitis, gastroduodenal disease, gastric outlet obstruction etc. We will also get him set up for gastric emptying scan, given the recurrent nausea, vomiting pain, and unintentional weight loss over the past several months.  For now, he can continue with Zofran as needed.  Thinking about his more longstanding bloating and loose stool, he has not had celiac serologies, so we will check these, along with fecal elastase level to screen for exocrine pancreatic insufficiency.    1. Chronic diarrhea  - Tissue transglutaminase boone IgA and IgG; Future  - IgA; Future  - NM Gastric Emptying; Future  - Adult GI  Referral - Procedure Only; Future  - Elastase Fecal; Future  - Tissue transglutaminase boone IgA and IgG  - IgA  - Elastase Fecal    2. Nausea and vomiting, unspecified vomiting type  - Tissue transglutaminase boone IgA and IgG; Future  - IgA; Future  - NM Gastric Emptying; Future  - Adult GI   Referral - Procedure Only; Future  - Tissue transglutaminase boone IgA and IgG  - IgA    3. Abdominal bloating  - Tissue transglutaminase boone IgA and IgG; Future  - IgA; Future  - NM Gastric Emptying; Future  - Adult GI  Referral - Procedure Only; Future  - Tissue transglutaminase boone IgA and IgG  - IgA    4. LUQ abdominal pain  - Tissue transglutaminase boone IgA and IgG; Future  - IgA; Future  - NM Gastric Emptying; Future  - Adult GI  Referral - Procedure Only; Future  - Tissue transglutaminase boone IgA and IgG  - IgA      Thank you for this consultation.  It was a pleasure to participate in the care of this patient; please contact us with any further questions.      25 minutes spent on the date of the encounter doing chart review, patient visit, and documentation    This note was created with voice recognition software, and while reviewed for accuracy, typos may remain.     Puma Hannah PA-C  Division of Gastroenterology, Hepatology and Nutrition  [facility]  Pop Eaton III is a 58 year old male with a past medical history significant for depression, restless legs, melanoma, that is seen as a new patient in the GI clinic today for evaluation of multiple GI concerns.  To review, patient describes having abdominal bloating and chronic loose stool for quite some time.  He has not really used any specific therapies to manage this, has generally gotten used to it.  Unfortunately, he has had more of a problem with daily nausea, vomiting, and left upper quadrant abdominal pain since undergoing colonoscopy this past December.  He had visits with primary care this spring, initially had abdominal x-ray with no obvious abnormalities, then an ultrasound suggestive of possible pancreatitis.  He was then seen in the ER a few weeks later, about a month ago, with the symptoms.  He had CT scan with no obvious abnormalities aside from evidence  of hepatic steatosis.  He was noted to have very mildly elevated liver enzymes as well.  Normal pancreas enzymes.    As of today, he has continued to have nausea every day, which typically leads to vomiting every morning.  There has been no hematemesis or coffee-ground emesis.  He continues to have bloating and left upper quadrant pain, along with chronically loose, nonbloody stool.    His colonoscopy from this past December was notable for subcentimeter polyps but no other concerning findings.  He has not had EGD before.  Surgical history pertinent for cholecystectomy and appendectomy.  No pertinent family medical history of digestive diseases.  He has been using higher dose of Zofran, which does seem to be helping the nausea bed, but has led to some brief periods of constipation.    No tobacco or alcohol use.  He does endorse using a half of a THC gummy at night for sleep.  ROS:    10 point ROS neg other than the symptoms noted above in the HPI.    PREVIOUS ENDOSCOPY:  See HPI    PERTINENT RELEVANT IMAGING OR LABS:  Reviewed    ALLERGIES:     Allergies   Allergen Reactions     Wellbutrin [Bupropion]        PERTINENT MEDICATIONS:    Current Outpatient Medications:      ondansetron (ZOFRAN ODT) 8 MG ODT tab, Take 1 tablet (8 mg) by mouth every 8 hours as needed for nausea, Disp: 20 tablet, Rfl: 1     oxyCODONE-acetaminophen (PERCOCET) 5-325 MG tablet, Take 1 tablet by mouth daily, Disp: , Rfl:     Current Facility-Administered Medications:      triamcinolone (KENALOG-40) injection 40 mg, 40 mg, Intra-Lesional, Once, Ayush Santos MD    PROBLEM LIST  Patient Active Problem List    Diagnosis Date Noted     Major depressive disorder, recurrent episode, moderate (H) 05/22/2023     Priority: Medium     Melanoma of skin (H) 05/22/2023     Priority: Medium     SNHL (sensory-neural hearing loss), asymmetrical 03/22/2021     Priority: Medium     Tinnitus, bilateral 03/22/2021     Priority: Medium     Mild major  depression (H) 06/08/2020     Priority: Medium     Family history of prostate cancer 08/07/2018     Priority: Medium     Restless legs syndrome 08/07/2018     Priority: Medium     History of colonic polyps, 2/2017 02/15/2017     Priority: Medium       PERTINENT PAST MEDICAL HISTORY:  No past medical history on file.    PREVIOUS SURGERIES:  Past Surgical History:   Procedure Laterality Date     ARTHROSCOPY KNEE RT/LT Left 2013    meniscal tear      ARTHROSCOPY KNEE RT/LT Right 10/2016    medial meniscal tear      COLONOSCOPY  02/2017    Q 3 years for polyps     COLONOSCOPY N/A 12/19/2023    Procedure: COLONOSCOPY, WITH POLYPECTOMY;  Surgeon: Gulshan Braden MD;  Location: [facility]     COLONOSCOPY WITH CO2 INSUFFLATION N/A 2/15/2017    Procedure: COLONOSCOPY WITH CO2 INSUFFLATION;  Surgeon: Galileo Lezama DO;  Location: [facility]     LAPAROSCOPIC APPENDECTOMY  2010     LAPAROSCOPIC CHOLECYSTECTOMY  2010     SURGICAL HISTORY OF -  Left 1995    corneal transplant X 2 on left eye       SOCIAL HISTORY:  Social History     Socioeconomic History     Marital status:      Spouse name: Not on file     Number of children: Not on file     Years of education: Not on file     Highest education level: Not on file   Occupational History     Not on file   Tobacco Use     Smoking status: Light Smoker     Packs/day: .04     Types: Cigarettes     Smokeless tobacco: Never   Vaping Use     Vaping Use: Never used   Substance and Sexual Activity     Alcohol use: Yes     Alcohol/week: 0.0 standard drinks of alcohol     Comment: 1-2 drinks per month      Drug use: No     Sexual activity: Yes     Partners: Female   Other Topics Concern     Parent/sibling w/ CABG, MI or angioplasty before 65F 55M? Not Asked   Social History Narrative     Not on file     Social Determinants of Health     Financial Resource Strain: Low Risk  (11/17/2023)    Financial Resource Strain      Within the past 12 months, have you or your family members you live  "with been unable to get utilities (heat, electricity) when it was really needed?: No   Food Insecurity: Low Risk  (11/17/2023)    Food Insecurity      Within the past 12 months, did you worry that your food would run out before you got money to buy more?: No      Within the past 12 months, did the food you bought just not last and you didn t have money to get more?: No   Transportation Needs: Low Risk  (11/17/2023)    Transportation Needs      Within the past 12 months, has lack of transportation kept you from medical appointments, getting your medicines, non-medical meetings or appointments, work, or from getting things that you need?: No   Physical Activity: Not on file   Stress: Not on file   Social Connections: Not on file   Interpersonal Safety: Not on file   Housing Stability: Low Risk  (11/17/2023)    Housing Stability      Do you have housing? : Yes      Are you worried about losing your housing?: No       FAMILY HISTORY:  Family History   Problem Relation Age of Onset     Prostate Cancer Maternal Uncle         X2       Past/family/social history reviewed and no changes    PHYSICAL EXAMINATION:  Constitutional: aaox3, cooperative, pleasant, not dyspneic/diaphoretic, no acute distress  Vitals reviewed: /78 (BP Location: Left arm, Patient Position: Sitting, Cuff Size: Adult Regular)   Pulse 87   Ht 1.727 m (5' 8\")   Wt 90.5 kg (199 lb 9.6 oz)   SpO2 95%   BMI 30.35 kg/m    Wt:   Wt Readings from Last 2 Encounters:   04/04/24 90.5 kg (199 lb 9.6 oz)   03/27/24 90.3 kg (199 lb)      Eyes: Sclera anicteric/injected  CV: No edema  Respiratory: Unlabored breathing  Abd: Nondistended, +bs, no hepatosplenomegaly, tenderness in the epigastrium and left upper quadrant without guarding  Skin: warm, perfused, no jaundice  Psych: Normal affect  MSK: Normal gait                      Again, thank you for allowing me to participate in the care of your patient.        Sincerely,        Puma Hannah PA-C  "

## 2024-04-04 NOTE — PROGRESS NOTES
NEW PATIENT GI CLINIC VISIT    CC/REFERRING MD:  No ref. provider found  REASON FOR CONSULTATION:   No ref. provider found for   Chief Complaint   Patient presents with    New Patient     New consult for nausea, vomiting, abdominal bloating, abdominal pain, and chronic diarrhea.       ASSESSMENT/PLAN: Patient here for evaluation of multiple concerns, including more recent development of nausea, vomiting, epigastric and left upper quadrant pain, as well as more longstanding abdominal bloating and chronic loose stool, which has previously been described as IBS.  We spent some time reviewing his recent workup.  He has been having some relief with Zofran but did not have any relief with PPI therapy.  I recommended proceeding with EGD to assess for reflux esophagitis, gastroduodenal disease, gastric outlet obstruction etc. We will also get him set up for gastric emptying scan, given the recurrent nausea, vomiting pain, and unintentional weight loss over the past several months.  For now, he can continue with Zofran as needed.  Thinking about his more longstanding bloating and loose stool, he has not had celiac serologies, so we will check these, along with fecal elastase level to screen for exocrine pancreatic insufficiency.    1. Chronic diarrhea  - Tissue transglutaminase boone IgA and IgG; Future  - IgA; Future  - NM Gastric Emptying; Future  - Adult GI  Referral - Procedure Only; Future  - Elastase Fecal; Future  - Tissue transglutaminase boone IgA and IgG  - IgA  - Elastase Fecal    2. Nausea and vomiting, unspecified vomiting type  - Tissue transglutaminase boone IgA and IgG; Future  - IgA; Future  - NM Gastric Emptying; Future  - Adult GI  Referral - Procedure Only; Future  - Tissue transglutaminase boone IgA and IgG  - IgA    3. Abdominal bloating  - Tissue transglutaminase boone IgA and IgG; Future  - IgA; Future  - NM Gastric Emptying; Future  - Adult GI  Referral - Procedure Only; Future  -  Tissue transglutaminase boone IgA and IgG  - IgA    4. LUQ abdominal pain  - Tissue transglutaminase boone IgA and IgG; Future  - IgA; Future  - NM Gastric Emptying; Future  - Adult GI  Referral - Procedure Only; Future  - Tissue transglutaminase boone IgA and IgG  - IgA      Thank you for this consultation.  It was a pleasure to participate in the care of this patient; please contact us with any further questions.      25 minutes spent on the date of the encounter doing chart review, patient visit, and documentation    This note was created with voice recognition software, and while reviewed for accuracy, typos may remain.     Puma Hannah PA-C  Division of Gastroenterology, Hepatology and Nutrition  [facility]  Pop Eaton III is a 58 year old male with a past medical history significant for depression, restless legs, melanoma, that is seen as a new patient in the GI clinic today for evaluation of multiple GI concerns.  To review, patient describes having abdominal bloating and chronic loose stool for quite some time.  He has not really used any specific therapies to manage this, has generally gotten used to it.  Unfortunately, he has had more of a problem with daily nausea, vomiting, and left upper quadrant abdominal pain since undergoing colonoscopy this past December.  He had visits with primary care this spring, initially had abdominal x-ray with no obvious abnormalities, then an ultrasound suggestive of possible pancreatitis.  He was then seen in the ER a few weeks later, about a month ago, with the symptoms.  He had CT scan with no obvious abnormalities aside from evidence of hepatic steatosis.  He was noted to have very mildly elevated liver enzymes as well.  Normal pancreas enzymes.    As of today, he has continued to have nausea every day, which typically leads to vomiting every morning.  There has been no hematemesis or coffee-ground  emesis.  He continues to have bloating and left upper quadrant pain, along with chronically loose, nonbloody stool.    His colonoscopy from this past December was notable for subcentimeter polyps but no other concerning findings.  He has not had EGD before.  Surgical history pertinent for cholecystectomy and appendectomy.  No pertinent family medical history of digestive diseases.  He has been using higher dose of Zofran, which does seem to be helping the nausea bed, but has led to some brief periods of constipation.    No tobacco or alcohol use.  He does endorse using a half of a THC gummy at night for sleep.  ROS:    10 point ROS neg other than the symptoms noted above in the HPI.    PREVIOUS ENDOSCOPY:  See HPI    PERTINENT RELEVANT IMAGING OR LABS:  Reviewed    ALLERGIES:     Allergies   Allergen Reactions    Wellbutrin [Bupropion]        PERTINENT MEDICATIONS:    Current Outpatient Medications:     ondansetron (ZOFRAN ODT) 8 MG ODT tab, Take 1 tablet (8 mg) by mouth every 8 hours as needed for nausea, Disp: 20 tablet, Rfl: 1    oxyCODONE-acetaminophen (PERCOCET) 5-325 MG tablet, Take 1 tablet by mouth daily, Disp: , Rfl:     Current Facility-Administered Medications:     triamcinolone (KENALOG-40) injection 40 mg, 40 mg, Intra-Lesional, Once, Ayush Santos MD    PROBLEM LIST  Patient Active Problem List    Diagnosis Date Noted    Major depressive disorder, recurrent episode, moderate (H) 05/22/2023     Priority: Medium    Melanoma of skin (H) 05/22/2023     Priority: Medium    SNHL (sensory-neural hearing loss), asymmetrical 03/22/2021     Priority: Medium    Tinnitus, bilateral 03/22/2021     Priority: Medium    Mild major depression (H) 06/08/2020     Priority: Medium    Family history of prostate cancer 08/07/2018     Priority: Medium    Restless legs syndrome 08/07/2018     Priority: Medium    History of colonic polyps, 2/2017 02/15/2017     Priority: Medium       PERTINENT PAST MEDICAL  HISTORY:  No past medical history on file.    PREVIOUS SURGERIES:  Past Surgical History:   Procedure Laterality Date    ARTHROSCOPY KNEE RT/LT Left 2013    meniscal tear     ARTHROSCOPY KNEE RT/LT Right 10/2016    medial meniscal tear     COLONOSCOPY  02/2017    Q 3 years for polyps    COLONOSCOPY N/A 12/19/2023    Procedure: COLONOSCOPY, WITH POLYPECTOMY;  Surgeon: Gulshan Braden MD;  Location: [facility]    COLONOSCOPY WITH CO2 INSUFFLATION N/A 2/15/2017    Procedure: COLONOSCOPY WITH CO2 INSUFFLATION;  Surgeon: Galileo Lezama DO;  Location: [facility]    LAPAROSCOPIC APPENDECTOMY  2010    LAPAROSCOPIC CHOLECYSTECTOMY  2010    SURGICAL HISTORY OF -  Left 1995    corneal transplant X 2 on left eye       SOCIAL HISTORY:  Social History     Socioeconomic History    Marital status:      Spouse name: Not on file    Number of children: Not on file    Years of education: Not on file    Highest education level: Not on file   Occupational History    Not on file   Tobacco Use    Smoking status: Light Smoker     Packs/day: .04     Types: Cigarettes    Smokeless tobacco: Never   Vaping Use    Vaping Use: Never used   Substance and Sexual Activity    Alcohol use: Yes     Alcohol/week: 0.0 standard drinks of alcohol     Comment: 1-2 drinks per month     Drug use: No    Sexual activity: Yes     Partners: Female   Other Topics Concern    Parent/sibling w/ CABG, MI or angioplasty before 65F 55M? Not Asked   Social History Narrative    Not on file     Social Determinants of Health     Financial Resource Strain: Low Risk  (11/17/2023)    Financial Resource Strain     Within the past 12 months, have you or your family members you live with been unable to get utilities (heat, electricity) when it was really needed?: No   Food Insecurity: Low Risk  (11/17/2023)    Food Insecurity     Within the past 12 months, did you worry that your food would run out before you got money to buy more?: No     Within the past 12 months, did the  "food you bought just not last and you didn t have money to get more?: No   Transportation Needs: Low Risk  (11/17/2023)    Transportation Needs     Within the past 12 months, has lack of transportation kept you from medical appointments, getting your medicines, non-medical meetings or appointments, work, or from getting things that you need?: No   Physical Activity: Not on file   Stress: Not on file   Social Connections: Not on file   Interpersonal Safety: Not on file   Housing Stability: Low Risk  (11/17/2023)    Housing Stability     Do you have housing? : Yes     Are you worried about losing your housing?: No       FAMILY HISTORY:  Family History   Problem Relation Age of Onset    Prostate Cancer Maternal Uncle         X2       Past/family/social history reviewed and no changes    PHYSICAL EXAMINATION:  Constitutional: aaox3, cooperative, pleasant, not dyspneic/diaphoretic, no acute distress  Vitals reviewed: /78 (BP Location: Left arm, Patient Position: Sitting, Cuff Size: Adult Regular)   Pulse 87   Ht 1.727 m (5' 8\")   Wt 90.5 kg (199 lb 9.6 oz)   SpO2 95%   BMI 30.35 kg/m    Wt:   Wt Readings from Last 2 Encounters:   04/04/24 90.5 kg (199 lb 9.6 oz)   03/27/24 90.3 kg (199 lb)      Eyes: Sclera anicteric/injected  CV: No edema  Respiratory: Unlabored breathing  Abd: Nondistended, +bs, no hepatosplenomegaly, tenderness in the epigastrium and left upper quadrant without guarding  Skin: warm, perfused, no jaundice  Psych: Normal affect  MSK: Normal gait                    "

## 2024-04-05 ENCOUNTER — ANCILLARY PROCEDURE (OUTPATIENT)
Dept: CT IMAGING | Facility: CLINIC | Age: 59
End: 2024-04-05
Payer: COMMERCIAL

## 2024-04-05 DIAGNOSIS — M47.24 OSTEOARTHRITIS OF SPINE WITH RADICULOPATHY, THORACIC REGION: ICD-10-CM

## 2024-04-05 LAB
IGA SERPL-MCNC: 240 MG/DL (ref 84–499)
TTG IGA SER-ACNC: 0.7 U/ML
TTG IGG SER-ACNC: 0.7 U/ML

## 2024-04-05 PROCEDURE — 72130 CT CHEST SPINE W/O & W/DYE: CPT | Mod: TC | Performed by: RADIOLOGY

## 2024-04-05 RX ORDER — IOPAMIDOL 755 MG/ML
67 INJECTION, SOLUTION INTRAVASCULAR ONCE
Status: COMPLETED | OUTPATIENT
Start: 2024-04-05 | End: 2024-04-05

## 2024-04-05 RX ADMIN — IOPAMIDOL 67 ML: 755 INJECTION, SOLUTION INTRAVASCULAR at 09:46

## 2024-04-08 DIAGNOSIS — R11.2 NAUSEA AND VOMITING, UNSPECIFIED VOMITING TYPE: Primary | ICD-10-CM

## 2024-04-08 DIAGNOSIS — R10.11 RUQ ABDOMINAL PAIN: ICD-10-CM

## 2024-04-08 PROCEDURE — 82653 EL-1 FECAL QUANTITATIVE: CPT | Performed by: PHYSICIAN ASSISTANT

## 2024-04-08 RX ORDER — HYDROXYZINE HYDROCHLORIDE 25 MG/1
25 TABLET, FILM COATED ORAL 3 TIMES DAILY PRN
Qty: 60 TABLET | Refills: 1 | Status: SHIPPED | OUTPATIENT
Start: 2024-04-08 | End: 2024-08-16

## 2024-04-08 RX ORDER — OXYCODONE AND ACETAMINOPHEN 5; 325 MG/1; MG/1
1 TABLET ORAL EVERY 6 HOURS PRN
Qty: 12 TABLET | Refills: 0 | Status: SHIPPED | OUTPATIENT
Start: 2024-04-08 | End: 2024-04-11

## 2024-04-08 NOTE — RESULT ENCOUNTER NOTE
Hi Chip,    Your recent blood tests are available for you to review, they have come back normal.  The stool test is still pending, I will reach out as soon as that comes in.    Please let me know if you have any questions.    Sincerely,  Puma MALIN [facility] GI, Hepatology, and Nutrition

## 2024-04-08 NOTE — PROGRESS NOTES
Patient continues to have pain, nausea and vomiting. Discussed going to [facility] spine for consult regarding new symptoms and there help in further determining best options for pain control as patient states that Tramadol does not help with pain management at all.

## 2024-04-09 DIAGNOSIS — R79.89 ABNORMAL LFTS: ICD-10-CM

## 2024-04-09 DIAGNOSIS — K76.0 FATTY LIVER: Primary | ICD-10-CM

## 2024-04-10 ENCOUNTER — MYC MEDICAL ADVICE (OUTPATIENT)
Dept: INTERNAL MEDICINE | Facility: CLINIC | Age: 59
End: 2024-04-10
Payer: COMMERCIAL

## 2024-04-10 LAB — ELASTASE PANC STL-MCNT: >800 UG/G

## 2024-04-10 NOTE — TELEPHONE ENCOUNTER
Appears this message was written after reviewing lab results below:     Hi Seferino, Your test results don't show any new concerns. I see you had follow up with GI and I am hoping they are going to be able to help you. Please let me know if you have any questions or concerns.     Take Care,  ADRIEN Pineda CNP   Written by ADRIEN Pineda CNP on 4/6/2024 12:18 PM CDT  Seen by patient Seferino Eaton III on 4/10/2024 10:14 AM    Please advise.     Mouna Harris, CANDY on 4/10/2024 at 5:11 PM

## 2024-04-11 NOTE — RESULT ENCOUNTER NOTE
Hi Chip,    The remainder of your blood tests and stool tests are back and are all within normal limits.  Please keep your current appointment scheduled for the gastric emptying scan next month.    Please let me know if you have any questions.    Sincerely,  Puma MALIN [facility] GI, Hepatology, and Nutrition

## 2024-04-17 ENCOUNTER — LAB (OUTPATIENT)
Dept: LAB | Facility: CLINIC | Age: 59
End: 2024-04-17
Payer: COMMERCIAL

## 2024-04-17 ENCOUNTER — PRE VISIT (OUTPATIENT)
Dept: GASTROENTEROLOGY | Facility: CLINIC | Age: 59
End: 2024-04-17

## 2024-04-17 ENCOUNTER — OFFICE VISIT (OUTPATIENT)
Dept: GASTROENTEROLOGY | Facility: CLINIC | Age: 59
End: 2024-04-17
Payer: COMMERCIAL

## 2024-04-17 VITALS
RESPIRATION RATE: 18 BRPM | HEART RATE: 78 BPM | HEIGHT: 68 IN | TEMPERATURE: 97.8 F | WEIGHT: 195.1 LBS | BODY MASS INDEX: 29.57 KG/M2 | OXYGEN SATURATION: 96 % | DIASTOLIC BLOOD PRESSURE: 87 MMHG | SYSTOLIC BLOOD PRESSURE: 129 MMHG

## 2024-04-17 DIAGNOSIS — K76.0 HEPATIC STEATOSIS: Primary | ICD-10-CM

## 2024-04-17 DIAGNOSIS — R73.03 PREDIABETES: ICD-10-CM

## 2024-04-17 DIAGNOSIS — R79.89 LFT ELEVATION: ICD-10-CM

## 2024-04-17 DIAGNOSIS — K76.0 HEPATIC STEATOSIS: ICD-10-CM

## 2024-04-17 DIAGNOSIS — F17.200 TOBACCO USE DISORDER: ICD-10-CM

## 2024-04-17 DIAGNOSIS — R79.89 ABNORMAL LFTS: ICD-10-CM

## 2024-04-17 DIAGNOSIS — K76.0 FATTY LIVER: ICD-10-CM

## 2024-04-17 DIAGNOSIS — R79.89 ABNORMAL LFTS: Primary | ICD-10-CM

## 2024-04-17 DIAGNOSIS — K76.0 FATTY LIVER DISEASE, NONALCOHOLIC: ICD-10-CM

## 2024-04-17 PROBLEM — Z48.89 AFTERCARE FOLLOWING SURGERY: Status: ACTIVE | Noted: 2019-12-02

## 2024-04-17 PROBLEM — S12.9XXA CERVICAL PSEUDOARTHROSIS (H): Status: ACTIVE | Noted: 2019-06-26

## 2024-04-17 PROBLEM — G25.89 SCAPULAR DYSKINESIS: Status: ACTIVE | Noted: 2018-11-13

## 2024-04-17 PROBLEM — M75.41 IMPINGEMENT SYNDROME, SHOULDER, RIGHT: Status: ACTIVE | Noted: 2018-11-13

## 2024-04-17 PROBLEM — M48.02 CERVICAL SPINAL STENOSIS: Status: ACTIVE | Noted: 2019-02-27

## 2024-04-17 PROBLEM — M75.52: Status: ACTIVE | Noted: 2021-06-06

## 2024-04-17 PROBLEM — M75.22 BICEPS TENDINITIS OF LEFT SHOULDER: Status: ACTIVE | Noted: 2021-06-06

## 2024-04-17 PROBLEM — M75.42 ROTATOR CUFF IMPINGEMENT SYNDROME OF LEFT SHOULDER: Status: ACTIVE | Noted: 2021-06-01

## 2024-04-17 LAB
ALBUMIN SERPL BCG-MCNC: 4.8 G/DL (ref 3.5–5.2)
ALP SERPL-CCNC: 104 U/L (ref 40–150)
ALT SERPL W P-5'-P-CCNC: 51 U/L (ref 0–70)
ANION GAP SERPL CALCULATED.3IONS-SCNC: 11 MMOL/L (ref 7–15)
AST SERPL W P-5'-P-CCNC: 35 U/L (ref 0–45)
BILIRUB DIRECT SERPL-MCNC: <0.2 MG/DL (ref 0–0.3)
BILIRUB SERPL-MCNC: 0.4 MG/DL
BUN SERPL-MCNC: 7.1 MG/DL (ref 6–20)
CALCIUM SERPL-MCNC: 9.7 MG/DL (ref 8.6–10)
CHLORIDE SERPL-SCNC: 105 MMOL/L (ref 98–107)
CREAT SERPL-MCNC: 1.04 MG/DL (ref 0.67–1.17)
DEPRECATED HCO3 PLAS-SCNC: 26 MMOL/L (ref 22–29)
EGFRCR SERPLBLD CKD-EPI 2021: 84 ML/MIN/1.73M2
ERYTHROCYTE [DISTWIDTH] IN BLOOD BY AUTOMATED COUNT: 16 % (ref 10–15)
FERRITIN SERPL-MCNC: 170 NG/ML (ref 31–409)
GLUCOSE SERPL-MCNC: 94 MG/DL (ref 70–99)
HCT VFR BLD AUTO: 51.1 % (ref 40–53)
HGB BLD-MCNC: 17.8 G/DL (ref 13.3–17.7)
INR PPP: 1 (ref 0.85–1.15)
IRON BINDING CAPACITY (ROCHE): 324 UG/DL (ref 240–430)
IRON SATN MFR SERPL: 32 % (ref 15–46)
IRON SERPL-MCNC: 103 UG/DL (ref 61–157)
MCH RBC QN AUTO: 27.2 PG (ref 26.5–33)
MCHC RBC AUTO-ENTMCNC: 34.8 G/DL (ref 31.5–36.5)
MCV RBC AUTO: 78 FL (ref 78–100)
PLATELET # BLD AUTO: 213 10E3/UL (ref 150–450)
POTASSIUM SERPL-SCNC: 3.8 MMOL/L (ref 3.4–5.3)
PROT SERPL-MCNC: 7.9 G/DL (ref 6.4–8.3)
RBC # BLD AUTO: 6.55 10E6/UL (ref 4.4–5.9)
SODIUM SERPL-SCNC: 142 MMOL/L (ref 135–145)
TSH SERPL DL<=0.005 MIU/L-ACNC: 1.91 UIU/ML (ref 0.3–4.2)
WBC # BLD AUTO: 13.2 10E3/UL (ref 4–11)

## 2024-04-17 PROCEDURE — 80053 COMPREHEN METABOLIC PANEL: CPT | Performed by: PATHOLOGY

## 2024-04-17 PROCEDURE — 86704 HEP B CORE ANTIBODY TOTAL: CPT | Performed by: PHYSICIAN ASSISTANT

## 2024-04-17 PROCEDURE — 99000 SPECIMEN HANDLING OFFICE-LAB: CPT | Performed by: PATHOLOGY

## 2024-04-17 PROCEDURE — 83516 IMMUNOASSAY NONANTIBODY: CPT | Mod: 90 | Performed by: PATHOLOGY

## 2024-04-17 PROCEDURE — 83540 ASSAY OF IRON: CPT | Performed by: PATHOLOGY

## 2024-04-17 PROCEDURE — 99204 OFFICE O/P NEW MOD 45 MIN: CPT | Performed by: PHYSICIAN ASSISTANT

## 2024-04-17 PROCEDURE — 82784 ASSAY IGA/IGD/IGG/IGM EACH: CPT | Performed by: PHYSICIAN ASSISTANT

## 2024-04-17 PROCEDURE — 36415 COLL VENOUS BLD VENIPUNCTURE: CPT | Performed by: PATHOLOGY

## 2024-04-17 PROCEDURE — 83550 IRON BINDING TEST: CPT | Performed by: PATHOLOGY

## 2024-04-17 PROCEDURE — 82728 ASSAY OF FERRITIN: CPT | Performed by: PATHOLOGY

## 2024-04-17 PROCEDURE — 99213 OFFICE O/P EST LOW 20 MIN: CPT | Performed by: PHYSICIAN ASSISTANT

## 2024-04-17 PROCEDURE — 86706 HEP B SURFACE ANTIBODY: CPT | Performed by: PHYSICIAN ASSISTANT

## 2024-04-17 PROCEDURE — 82390 ASSAY OF CERULOPLASMIN: CPT | Performed by: PHYSICIAN ASSISTANT

## 2024-04-17 PROCEDURE — 82104 ALPHA-1-ANTITRYPSIN PHENO: CPT | Mod: 90 | Performed by: PATHOLOGY

## 2024-04-17 PROCEDURE — 87340 HEPATITIS B SURFACE AG IA: CPT | Performed by: PHYSICIAN ASSISTANT

## 2024-04-17 PROCEDURE — 86381 MITOCHONDRIAL ANTIBODY EACH: CPT | Performed by: PHYSICIAN ASSISTANT

## 2024-04-17 PROCEDURE — 85027 COMPLETE CBC AUTOMATED: CPT | Performed by: PATHOLOGY

## 2024-04-17 PROCEDURE — 82103 ALPHA-1-ANTITRYPSIN TOTAL: CPT | Mod: 90 | Performed by: PATHOLOGY

## 2024-04-17 PROCEDURE — 82248 BILIRUBIN DIRECT: CPT | Performed by: PATHOLOGY

## 2024-04-17 PROCEDURE — 86038 ANTINUCLEAR ANTIBODIES: CPT | Performed by: PHYSICIAN ASSISTANT

## 2024-04-17 PROCEDURE — 85610 PROTHROMBIN TIME: CPT | Performed by: PATHOLOGY

## 2024-04-17 PROCEDURE — 86708 HEPATITIS A ANTIBODY: CPT | Performed by: PHYSICIAN ASSISTANT

## 2024-04-17 PROCEDURE — 84443 ASSAY THYROID STIM HORMONE: CPT | Performed by: PATHOLOGY

## 2024-04-17 RX ORDER — TRAMADOL HYDROCHLORIDE 50 MG/1
50 TABLET ORAL 2 TIMES DAILY
COMMUNITY

## 2024-04-17 ASSESSMENT — PAIN SCALES - GENERAL: PAINLEVEL: EXTREME PAIN (8)

## 2024-04-17 NOTE — PROGRESS NOTES
Hepatology Clinic Note  Pop Eaton III   Date of Birth 1965    REASON FOR CONSULT: fatty liver, abnormal LFTs  REFERRING PROVIDER: Fela Babin APRN CN         Assessment/plan:   Pop Eaton III is a 59 YO M with PMHx significant for biliary colic, MDD, melanoma, depression, cervical spinal stenosis, RLS, PTSD, hx of colonic polyps and tobacco use disorder who presents today for concerns regarding abnormal LFTs and imaging which has noted hepatic steatosis.  Referred by PCP.  No known underlying liver disease/condition.  Has never had a liver biopsy.  Denies known family hx of liver disease or liver cancer.  Very rare alcohol consumption.    Waxing/waning, mildly elevated LFTs (ALT>AST), Likely 2/2 MAFLD vs other   Hepatic steatosis (S3), likely metabolic associated and not alcohol related   Vomiting, left-sided abdominal pain   > Discussed with pt L sided abd pain is very unlikely to be 2/2 liver etiology   Hx IBS  Recent unintentional weight loss    US abd complete 2/14/24: Increased echogenicity from diffuse fatty infiltration. No focal hepatic lesions. Gallbladder surgically absent. No biliary dilatation. CBD 6 mm. Portions of CBD could not be visualized due to overlying bowel gas.    CT abd/pelvis w contrast 3/3/24: Moderate diffuse hepatic steatosis. Cholecystectomy. No biliary ductal dilatation. Spleen normal. Pancreas normal.      FIB-4: 1.46: AST: 50, ALT: 72, PLT: 230, Age: 58)    Fibroscan 4/17/24: S3 steatosis and F0/F1 fibrosis    Risk factors for metabolic fatty liver disease: Prediabetes, BMI 29, limited routine physical exercise as of late, mixed dyslipidemia    PLAN:  - Reviewed recent labs and imaging  - Pending additional exclusionary liver labs   - Completed and discussed fibroscan results  - Discussed pathophysiology and natural hx of nonalcoholic fatty liver disease   - Recommend slow gradual weight loss. Discussed how a weight loss of 3-5% can show improvement on  steatosis  - Maintain good control of cholesterol (No contraindication to starting a statin with LFT elevations)   - Optimize blood glucose as needed. Could consider GLP-1 inhibitor for both insulin resistance and help with weight loss  - Limit carbs, especially simple carbs, and following Mediterranean eating patten  - Increase physical activity as able  - Continue with rare alcohol use in moderation  - Continue to follow with PCP and GI regarding abdominal pain, nausea, vomiting and irregular bowel issues   - Follow up with me in ~6 months with labs same day    Zoya Coon PA-C  [facility]   -----------------------------------------------------         HPI:     Patient presents today for consult regarding concerns of fatty liver and elevated LFTs.  States all he had today to eat or drink less coffee with cream this morning.  Since December 2023, has been dealing with left-sided abdominal pain that is constant and throbbing.  Patient states since December he has also been dealing with nausea and vomiting.  Over the past 2 months has been vomiting typically daily in the morning.  No history of vomiting blood.  No recent fevers  or chills.  No yellowing of eyes or skin.  Is no longer using Zofran.  States he is taking hydroxyzine for nausea daily which does seem to be effective.  Is no longer on oxycodone either.  Has never been diagnosed with pancreatitis.  Has lost about 35 pounds unintentionally over the past 3-4 months.  Has been trying to do most of his cooking at home as of late.  Has not been doing any routine physical exercise due to pain.  Has never been told he is diabetic.  Has never been on cholesterol-lowering medications.  Was initially told about fatty liver about 3 months ago.  Has never been told in the past that he has abnormal liver enzymes.      States he establish with a new PCP 2-3 months and is much happier with his ongoing care.    Currently smokes 5-8  cigarettes per day.  No hx of IV drug use.  Occasionally uses THC Gummies for sleep and has done this for the past 2 mo or so.  No hx of alcohol abuse. Drinks 1-2 alcoholic beverages per year.    Abdominal surgical history has included appendectomy, cholecystectomy and hernia repair.    Previous work-up:   HCV antibody: nonreactive   HIV: nonreactive    TTG: negative   IgA: unremarkable   Cholesterol: 129  HDL: 27  LDL: 60  Triglycerides: 208  Hemoglobin A1c: 5.8    HAV Ab total:    HBV Sab:  HBV Sag:   HBV CAb  Alpha-1-antitrypsin  Ceruloplasmin   TSH   SHELDON   F-actin  AMA   Iron panel   Ferritin   Iron Sats:     PMH:    has no past medical history on file.     [facility]:    has a past surgical history that includes Laparoscopic appendectomy (2010); laparoscopic cholecystectomy (2010); arthroscopy knee rt/lt (Left, 2013); surgical history of -  (Left, 1995); arthroscopy knee rt/lt (Right, 10/2016); colonoscopy (02/2017); Colonoscopy with CO2 insufflation (N/A, 02/15/2017); Colonoscopy (N/A, 12/19/2023); and hernia repair (2010).     Medications:   Current Outpatient Medications   Medication Sig Dispense Refill    hydrOXYzine HCl (ATARAX) 25 MG tablet Take 1 tablet (25 mg) by mouth 3 times daily as needed for other or itching (nausea and vomiting) 60 tablet 1    ondansetron (ZOFRAN ODT) 8 MG ODT tab Take 1 tablet (8 mg) by mouth every 8 hours as needed for nausea 20 tablet 1    oxyCODONE-acetaminophen (PERCOCET) 5-325 MG tablet Take 1 tablet by mouth daily       Current Facility-Administered Medications   Medication Dose Route Frequency Provider Last Rate Last Admin    triamcinolone (KENALOG-40) injection 40 mg  40 mg Intra-Lesional Once Ayush Santos MD         Recent Labs   Lab Test 03/27/24  1204 02/12/24  1235 08/02/23  1436 03/15/23  1533 07/19/22  1422 03/18/22  1414 09/27/21  1212 01/18/21  0915 07/30/18  1037   ALKPHOS 102 76 72 94 79 83 89  --  115   ALT 72* 86* 46 53 90* 91* 52 31 41   AST 50* 44 41 23  58* 54* 31 23 31   BILITOTAL 0.4 0.5 0.3 0.3 0.5 0.2 0.3  --  0.4           Allergies:     Allergies   Allergen Reactions    Wellbutrin [Bupropion]           Social History:     Social History     Socioeconomic History    Marital status:      Spouse name: Not on file    Number of children: Not on file    Years of education: Not on file    Highest education level: Not on file   Occupational History    Not on file   Tobacco Use    Smoking status: Light Smoker     Current packs/day: 0.04     Types: Cigarettes    Smokeless tobacco: Never    Tobacco comments:     5-8 cigs per day   Vaping Use    Vaping status: Never Used   Substance and Sexual Activity    Alcohol use: Yes     Comment: 1-2 drinks per year; no hx of alcohol abuse    Drug use: No     Comment: THC gummies for sleep (use for ~2 mo); no hx IVDU    Sexual activity: Yes     Partners: Female   Other Topics Concern    Parent/sibling w/ CABG, MI or angioplasty before 65F 55M? Not Asked   Social History Narrative    Not on file     Social Determinants of Health     Financial Resource Strain: Low Risk  (11/17/2023)    Financial Resource Strain     Within the past 12 months, have you or your family members you live with been unable to get utilities (heat, electricity) when it was really needed?: No   Food Insecurity: Low Risk  (11/17/2023)    Food Insecurity     Within the past 12 months, did you worry that your food would run out before you got money to buy more?: No     Within the past 12 months, did the food you bought just not last and you didn t have money to get more?: No   Transportation Needs: Low Risk  (11/17/2023)    Transportation Needs     Within the past 12 months, has lack of transportation kept you from medical appointments, getting your medicines, non-medical meetings or appointments, work, or from getting things that you need?: No   Physical Activity: Not on file   Stress: Not on file   Social Connections: Unknown (12/27/2021)    Received from  "[facility], [facility]    Social Connections     Frequency of Communication with Friends and Family: Not on file   Interpersonal Safety: Not on file   Housing Stability: Low Risk  (11/17/2023)    Housing Stability     Do you have housing? : Yes     Are you worried about losing your housing?: No          Family History:     Family History   Problem Relation Age of Onset    Prostate Cancer Maternal Uncle         X2    Liver Disease No family hx of     Liver Cancer No family hx of           Review of Systems:   Gen: See HPI          Physical Exam:   Vital signs:  Temp: 97.8  F (36.6  C) Temp src: Oral BP: 129/87 Pulse: 78   Resp: 18 SpO2: 96 %     Height: 172.7 cm (5' 7.99\") Weight: 88.5 kg (195 lb 1.6 oz)  Estimated body mass index is 29.67 kg/m  as calculated from the following:    Height as of this encounter: 1.727 m (5' 7.99\").    Weight as of this encounter: 88.5 kg (195 lb 1.6 oz).  Gen: A&Ox3, NAD  HEENT: Sclera anicteric   CV: RRR, no overt murmurs  Lung: CTA, no wheezing or crackles.   Abd: soft, TTP in LUQ and epigastric region, ND, no palpable splenomegaly, liver is not palpable.   Ext: no edema, intact pulses.   Skin: No jaundice  Neuro: grossly intact, no asterixis   Psych: appropriate mood and affects         Data:   Reviewed in person and significant for:    Lab Results   Component Value Date     02/12/2024     06/21/2021      Lab Results   Component Value Date    POTASSIUM 4.4 02/12/2024    POTASSIUM 3.8 06/21/2021     Lab Results   Component Value Date    CHLORIDE 103 02/12/2024    CHLORIDE 109 06/21/2021     Lab Results   Component Value Date    CO2 25 02/12/2024    CO2 26 06/21/2021     Lab Results   Component Value Date    BUN 7.9 02/12/2024    BUN 11 06/21/2021     Lab Results   Component Value Date    CR 1.07 02/12/2024    CR 1.10 06/21/2021       Lab Results   Component Value Date    WBC 13.9 03/27/2024    WBC 13.7 " "08/07/2018     Lab Results   Component Value Date    HGB 16.7 03/27/2024    HGB 15.9 06/21/2021     Lab Results   Component Value Date    HCT 48.1 03/27/2024    HCT 47.9 08/07/2018     Lab Results   Component Value Date    MCV 79 03/27/2024    MCV 81 08/07/2018     Lab Results   Component Value Date     03/27/2024     08/07/2018       Lab Results   Component Value Date    AST 50 03/27/2024    AST 23 01/18/2021     Lab Results   Component Value Date    ALT 72 03/27/2024    ALT 31 01/18/2021     No results found for: \"BILICONJ\"   Lab Results   Component Value Date    BILITOTAL 0.4 03/27/2024    BILITOTAL 0.4 07/30/2018       Lab Results   Component Value Date    ALBUMIN 4.6 03/27/2024    ALBUMIN 3.8 03/15/2023    ALBUMIN 3.8 07/30/2018     Lab Results   Component Value Date    PROTTOTAL 7.4 03/27/2024    PROTTOTAL 7.6 07/30/2018      Lab Results   Component Value Date    ALKPHOS 102 03/27/2024    ALKPHOS 115 07/30/2018     Lab Results   Component Value Date    INR 0.84 02/20/2019       Imaging:      CT ABDOMEN PELVIS W  LOCATION: [facility]  DATE: 3/3/2024    INDICATION: Abdominal or pelvic pain  COMPARISON: None.  TECHNIQUE: CT scan of the abdomen and pelvis was performed following injection of IV contrast. Multiplanar reformats were obtained. Dose reduction techniques were used.  CONTRAST: Omnipaque 350 87mL    FINDINGS:  LOWER CHEST: Normal.    HEPATOBILIARY: Moderate diffuse hepatic steatosis. Cholecystectomy. No biliary ductal dilatation.    PANCREAS: Normal.    SPLEEN: Normal.    ADRENAL GLANDS: Normal.    KIDNEYS/BLADDER: Symmetric enhancement of both kidneys. No hydronephrosis. Urinary bladder only minimally distended though otherwise unremarkable.    BOWEL: Small and large bowel loops are normal in caliber without obstruction. Appendix is normal in appearance. No free fluid or pneumoperitoneum.    LYMPH NODES: No abdominal or pelvic lymphadenopathy.    VASCULATURE: Abdominal aorta normal in " caliber with mild mixed atheromatous plaque. Major portal veins are patent.    PELVIC ORGANS: Unremarkable.    MUSCULOSKELETAL: Tiny umbilical hernia containing fat and short segment of nonobstructed small bowel.        US ABDOMEN COMPLETE 2/14/2024      CLINICAL HISTORY: Patient having issue with vomiting in am, pain on  palpation to right upper quadrant and right and left lower quadrant,  history of IBS, had gallbalder and appendix removed; Nausea and  vomiting, unspecified vomiting type  TECHNIQUE: Complete abdominal ultrasound.  COMPARISON: None.     FINDINGS:  GALLBLADDER: Surgically absent.     BILE DUCTS: No biliary dilatation. The common duct measures 6 mm.  Portions of the common duct could not be visualized due to overlying  bowel gas.     LIVER: Increased echogenicity from diffuse fatty infiltration. No  focal hepatic lesions are identified.     RIGHT KIDNEY: Normal size. Normal echogenicity with no hydronephrosis  or mass.      LEFT KIDNEY: Normal size. Normal echogenicity with no hydronephrosis  or mass.      SPLEEN: Normal.     PANCREAS: Pancreas appears heterogenous.     AORTA: Normal caliber where seen.      IVC: Normal where visualized.     No ascites.                                                                    IMPRESSION:  1.  Heterogenous appearance of the pancreas that may suggest pancreatitis. Recommend correlation with lipase levels and if further concern, suggest CT exam.  2.  Diffuse hepatic steatosis.     CELIA MCKEON MD

## 2024-04-17 NOTE — LETTER
4/17/2024         RE: Pop Eaton III  [address] [facility] [address]        Dear Colleague,    Thank you for referring your patient, Pop Eaton III, to the [facility] [place]. Please see a copy of my visit note below.    Hepatology Clinic Note  Pop Eaton III   Date of Birth 1965    REASON FOR CONSULT: fatty liver, abnormal LFTs  REFERRING PROVIDER: Fela Babin APRN CN         Assessment/plan:   Pop Eaton III is a 57 YO M with PMHx significant for biliary colic, MDD, melanoma, depression, cervical spinal stenosis, RLS, PTSD, hx of colonic polyps and tobacco use disorder who presents today for concerns regarding abnormal LFTs and imaging which has noted hepatic steatosis.  Referred by PCP.  No known underlying liver disease/condition.  Has never had a liver biopsy.  Denies known family hx of liver disease or liver cancer.  Very rare alcohol consumption.    Waxing/waning, mildly elevated LFTs (ALT>AST), Likely 2/2 MAFLD vs other   Hepatic steatosis (S3), likely metabolic associated and not alcohol related   Vomiting, left-sided abdominal pain   > Discussed with pt L sided abd pain is very unlikely to be 2/2 liver etiology   Hx IBS  Recent unintentional weight loss    US abd complete 2/14/24: Increased echogenicity from diffuse fatty infiltration. No focal hepatic lesions. Gallbladder surgically absent. No biliary dilatation. CBD 6 mm. Portions of CBD could not be visualized due to overlying bowel gas.    CT abd/pelvis w contrast 3/3/24: Moderate diffuse hepatic steatosis. Cholecystectomy. No biliary ductal dilatation. Spleen normal. Pancreas normal.      FIB-4: 1.46: AST: 50, ALT: 72, PLT: 230, Age: 58)    Fibroscan 4/17/24: S3 steatosis and F0/F1 fibrosis    Risk factors for metabolic fatty liver disease: Prediabetes, BMI 29, limited routine physical exercise as of late, mixed dyslipidemia    PLAN:  - Reviewed recent labs  and imaging  - Pending additional exclusionary liver labs   - Completed and discussed fibroscan results  - Discussed pathophysiology and natural hx of nonalcoholic fatty liver disease   - Recommend slow gradual weight loss. Discussed how a weight loss of 3-5% can show improvement on steatosis  - Maintain good control of cholesterol (No contraindication to starting a statin with LFT elevations)   - Optimize blood glucose as needed. Could consider GLP-1 inhibitor for both insulin resistance and help with weight loss  - Limit carbs, especially simple carbs, and following Mediterranean eating patten  - Increase physical activity as able  - Continue with rare alcohol use in moderation  - Continue to follow with PCP and GI regarding abdominal pain, nausea, vomiting and irregular bowel issues   - Follow up with me in ~6 months with labs same day    Zoya Coon PA-C  [facility]   -----------------------------------------------------         HPI:     Patient presents today for consult regarding concerns of fatty liver and elevated LFTs.  States all he had today to eat or drink less coffee with cream this morning.  Since December 2023, has been dealing with left-sided abdominal pain that is constant and throbbing.  Patient states since December he has also been dealing with nausea and vomiting.  Over the past 2 months has been vomiting typically daily in the morning.  No history of vomiting blood.  No recent fevers  or chills.  No yellowing of eyes or skin.  Is no longer using Zofran.  States he is taking hydroxyzine for nausea daily which does seem to be effective.  Is no longer on oxycodone either.  Has never been diagnosed with pancreatitis.  Has lost about 35 pounds unintentionally over the past 3-4 months.  Has been trying to do most of his cooking at home as of late.  Has not been doing any routine physical exercise due to pain.  Has never been told he is diabetic.  Has never been on  cholesterol-lowering medications.  Was initially told about fatty liver about 3 months ago.  Has never been told in the past that he has abnormal liver enzymes.      States he establish with a new PCP 2-3 months and is much happier with his ongoing care.    Currently smokes 5-8 cigarettes per day.  No hx of IV drug use.  Occasionally uses THC Gummies for sleep and has done this for the past 2 mo or so.  No hx of alcohol abuse. Drinks 1-2 alcoholic beverages per year.    Abdominal surgical history has included appendectomy, cholecystectomy and hernia repair.    Previous work-up:   HCV antibody: nonreactive   HIV: nonreactive    TTG: negative   IgA: unremarkable   Cholesterol: 129  HDL: 27  LDL: 60  Triglycerides: 208  Hemoglobin A1c: 5.8    HAV Ab total:    HBV Sab:  HBV Sag:   HBV CAb  Alpha-1-antitrypsin  Ceruloplasmin   TSH   SHELDON   F-actin  AMA   Iron panel   Ferritin   Iron Sats:     PMH:    has no past medical history on file.     SMH:    has a past surgical history that includes Laparoscopic appendectomy (2010); laparoscopic cholecystectomy (2010); arthroscopy knee rt/lt (Left, 2013); surgical history of -  (Left, 1995); arthroscopy knee rt/lt (Right, 10/2016); colonoscopy (02/2017); Colonoscopy with CO2 insufflation (N/A, 02/15/2017); Colonoscopy (N/A, 12/19/2023); and hernia repair (2010).     Medications:   Current Outpatient Medications   Medication Sig Dispense Refill     hydrOXYzine HCl (ATARAX) 25 MG tablet Take 1 tablet (25 mg) by mouth 3 times daily as needed for other or itching (nausea and vomiting) 60 tablet 1     ondansetron (ZOFRAN ODT) 8 MG ODT tab Take 1 tablet (8 mg) by mouth every 8 hours as needed for nausea 20 tablet 1     oxyCODONE-acetaminophen (PERCOCET) 5-325 MG tablet Take 1 tablet by mouth daily       Current Facility-Administered Medications   Medication Dose Route Frequency Provider Last Rate Last Admin     triamcinolone (KENALOG-40) injection 40 mg  40 mg Intra-Lesional Once  Ayush Santos MD         Recent Labs   Lab Test 03/27/24  1204 02/12/24  1235 08/02/23  1436 03/15/23  1533 07/19/22  1422 03/18/22  1414 09/27/21  1212 01/18/21  0915 07/30/18  1037   ALKPHOS 102 76 72 94 79 83 89  --  115   ALT 72* 86* 46 53 90* 91* 52 31 41   AST 50* 44 41 23 58* 54* 31 23 31   BILITOTAL 0.4 0.5 0.3 0.3 0.5 0.2 0.3  --  0.4           Allergies:     Allergies   Allergen Reactions     Wellbutrin [Bupropion]           Social History:     Social History     Socioeconomic History     Marital status:      Spouse name: Not on file     Number of children: Not on file     Years of education: Not on file     Highest education level: Not on file   Occupational History     Not on file   Tobacco Use     Smoking status: Light Smoker     Current packs/day: 0.04     Types: Cigarettes     Smokeless tobacco: Never     Tobacco comments:     5-8 cigs per day   Vaping Use     Vaping status: Never Used   Substance and Sexual Activity     Alcohol use: Yes     Comment: 1-2 drinks per year; no hx of alcohol abuse     Drug use: No     Comment: THC gummies for sleep (use for ~2 mo); no hx IVDU     Sexual activity: Yes     Partners: Female   Other Topics Concern     Parent/sibling w/ CABG, MI or angioplasty before 65F 55M? Not Asked   Social History Narrative     Not on file     Social Determinants of Health     Financial Resource Strain: Low Risk  (11/17/2023)    Financial Resource Strain      Within the past 12 months, have you or your family members you live with been unable to get utilities (heat, electricity) when it was really needed?: No   Food Insecurity: Low Risk  (11/17/2023)    Food Insecurity      Within the past 12 months, did you worry that your food would run out before you got money to buy more?: No      Within the past 12 months, did the food you bought just not last and you didn t have money to get more?: No   Transportation Needs: Low Risk  (11/17/2023)    Transportation Needs      Within  "the past 12 months, has lack of transportation kept you from medical appointments, getting your medicines, non-medical meetings or appointments, work, or from getting things that you need?: No   Physical Activity: Not on file   Stress: Not on file   Social Connections: Unknown (12/27/2021)    [facility]    Social Connections      Frequency of Communication with Friends and Family: Not on file   Interpersonal Safety: Not on file   Housing Stability: Low Risk  (11/17/2023)    Housing Stability      Do you have housing? : Yes      Are you worried about losing your housing?: No          Family History:     Family History   Problem Relation Age of Onset     Prostate Cancer Maternal Uncle         X2     Liver Disease No family hx of      Liver Cancer No family hx of           Review of Systems:   Gen: See HPI          Physical Exam:   Vital signs:  Temp: 97.8  F (36.6  C) Temp src: Oral BP: 129/87 Pulse: 78   Resp: 18 SpO2: 96 %     Height: 172.7 cm (5' 7.99\") Weight: 88.5 kg (195 lb 1.6 oz)  Estimated body mass index is 29.67 kg/m  as calculated from the following:    Height as of this encounter: 1.727 m (5' 7.99\").    Weight as of this encounter: 88.5 kg (195 lb 1.6 oz).  Gen: A&Ox3, NAD  HEENT: Sclera anicteric   CV: RRR, no overt murmurs  Lung: CTA, no wheezing or crackles.   Abd: soft, TTP in LUQ and epigastric region, ND, no palpable splenomegaly, liver is not palpable.   Ext: no edema, intact pulses.   Skin: No jaundice  Neuro: grossly intact, no asterixis   Psych: appropriate mood and affects         Data:   Reviewed in person and significant for:    Lab Results   Component Value Date     02/12/2024     06/21/2021      Lab Results   Component Value Date    POTASSIUM 4.4 02/12/2024    POTASSIUM 3.8 06/21/2021     Lab Results   Component Value Date    CHLORIDE 103 02/12/2024    CHLORIDE 109 06/21/2021     Lab " "Results   Component Value Date    CO2 25 02/12/2024    CO2 26 06/21/2021     Lab Results   Component Value Date    BUN 7.9 02/12/2024    BUN 11 06/21/2021     Lab Results   Component Value Date    CR 1.07 02/12/2024    CR 1.10 06/21/2021       Lab Results   Component Value Date    WBC 13.9 03/27/2024    WBC 13.7 08/07/2018     Lab Results   Component Value Date    HGB 16.7 03/27/2024    HGB 15.9 06/21/2021     Lab Results   Component Value Date    HCT 48.1 03/27/2024    HCT 47.9 08/07/2018     Lab Results   Component Value Date    MCV 79 03/27/2024    MCV 81 08/07/2018     Lab Results   Component Value Date     03/27/2024     08/07/2018       Lab Results   Component Value Date    AST 50 03/27/2024    AST 23 01/18/2021     Lab Results   Component Value Date    ALT 72 03/27/2024    ALT 31 01/18/2021     No results found for: \"BILICONJ\"   Lab Results   Component Value Date    BILITOTAL 0.4 03/27/2024    BILITOTAL 0.4 07/30/2018       Lab Results   Component Value Date    ALBUMIN 4.6 03/27/2024    ALBUMIN 3.8 03/15/2023    ALBUMIN 3.8 07/30/2018     Lab Results   Component Value Date    PROTTOTAL 7.4 03/27/2024    PROTTOTAL 7.6 07/30/2018      Lab Results   Component Value Date    ALKPHOS 102 03/27/2024    ALKPHOS 115 07/30/2018     Lab Results   Component Value Date    INR 0.84 02/20/2019       Imaging:      CT ABDOMEN PELVIS W  LOCATION: [facility]  DATE: 3/3/2024    INDICATION: Abdominal or pelvic pain  COMPARISON: None.  TECHNIQUE: CT scan of the abdomen and pelvis was performed following injection of IV contrast. Multiplanar reformats were obtained. Dose reduction techniques were used.  CONTRAST: Omnipaque 350 87mL    FINDINGS:  LOWER CHEST: Normal.    HEPATOBILIARY: Moderate diffuse hepatic steatosis. Cholecystectomy. No biliary ductal dilatation.    PANCREAS: Normal.    SPLEEN: Normal.    ADRENAL GLANDS: Normal.    KIDNEYS/BLADDER: Symmetric enhancement of both kidneys. No hydronephrosis. " Urinary bladder only minimally distended though otherwise unremarkable.    BOWEL: Small and large bowel loops are normal in caliber without obstruction. Appendix is normal in appearance. No free fluid or pneumoperitoneum.    LYMPH NODES: No abdominal or pelvic lymphadenopathy.    VASCULATURE: Abdominal aorta normal in caliber with mild mixed atheromatous plaque. Major portal veins are patent.    PELVIC ORGANS: Unremarkable.    MUSCULOSKELETAL: Tiny umbilical hernia containing fat and short segment of nonobstructed small bowel.        US ABDOMEN COMPLETE 2/14/2024      CLINICAL HISTORY: Patient having issue with vomiting in am, pain on  palpation to right upper quadrant and right and left lower quadrant,  history of IBS, had gallbalder and appendix removed; Nausea and  vomiting, unspecified vomiting type  TECHNIQUE: Complete abdominal ultrasound.  COMPARISON: None.     FINDINGS:  GALLBLADDER: Surgically absent.     BILE DUCTS: No biliary dilatation. The common duct measures 6 mm.  Portions of the common duct could not be visualized due to overlying  bowel gas.     LIVER: Increased echogenicity from diffuse fatty infiltration. No  focal hepatic lesions are identified.     RIGHT KIDNEY: Normal size. Normal echogenicity with no hydronephrosis  or mass.      LEFT KIDNEY: Normal size. Normal echogenicity with no hydronephrosis  or mass.      SPLEEN: Normal.     PANCREAS: Pancreas appears heterogenous.     AORTA: Normal caliber where seen.      IVC: Normal where visualized.     No ascites.                                                                    IMPRESSION:  1.  Heterogenous appearance of the pancreas that may suggest pancreatitis. Recommend correlation with lipase levels and if further concern, suggest CT exam.  2.  Diffuse hepatic steatosis.     CELIA MCKEON MD          Again, thank you for allowing me to participate in the care of your patient.        Sincerely,        Zoya Coon PA-C

## 2024-04-17 NOTE — NURSING NOTE
"Chief Complaint   Patient presents with    Consult     Elevated LFT's and fatty liver on imaging      Vital signs:  Temp: 97.8  F (36.6  C) Temp src: Oral BP: 129/87 Pulse: 78   Resp: 18 SpO2: 96 %     Height: 172.7 cm (5' 7.99\") Weight: 88.5 kg (195 lb 1.6 oz)  Estimated body mass index is 29.67 kg/m  as calculated from the following:    Height as of this encounter: 1.727 m (5' 7.99\").    Weight as of this encounter: 88.5 kg (195 lb 1.6 oz).      Sue Phipps, [facility]  4/17/2024 2:30 PM    "

## 2024-04-18 ENCOUNTER — TRANSFERRED RECORDS (OUTPATIENT)
Dept: HEALTH INFORMATION MANAGEMENT | Facility: CLINIC | Age: 59
End: 2024-04-18
Payer: COMMERCIAL

## 2024-04-18 DIAGNOSIS — R79.89 ABNORMAL LFTS: Primary | ICD-10-CM

## 2024-04-18 LAB
ANA PAT SER IF-IMP: ABNORMAL
ANA SER QL IF: ABNORMAL
ANA TITR SER IF: ABNORMAL {TITER}
CERULOPLASMIN SERPL-MCNC: 31 MG/DL (ref 20–60)
HAV AB SER QL IA: NONREACTIVE
HBV CORE AB SERPL QL IA: NONREACTIVE
HBV SURFACE AB SERPL IA-ACNC: 19.5 M[IU]/ML
HBV SURFACE AB SERPL IA-ACNC: REACTIVE M[IU]/ML
HBV SURFACE AG SERPL QL IA: NONREACTIVE

## 2024-04-19 LAB
MITOCHONDRIA M2 IGG SER-ACNC: <1 U/ML
SMA IGG SER-ACNC: 3 UNITS

## 2024-04-20 LAB
A1AT PHENOTYP SERPL-IMP: NORMAL
A1AT SERPL-MCNC: 95 MG/DL

## 2024-04-22 LAB — IGG SERPL-MCNC: 777 MG/DL (ref 610–1616)

## 2024-04-25 ENCOUNTER — TELEPHONE (OUTPATIENT)
Dept: GASTROENTEROLOGY | Facility: CLINIC | Age: 59
End: 2024-04-25
Payer: COMMERCIAL

## 2024-04-25 NOTE — TELEPHONE ENCOUNTER
"Endoscopy Scheduling Screen    Have you had a positive Covid test in the last 14 days?  No    What is your communication preference for Instructions and/or Bowel Prep?   MyChart    What insurance is in the chart?  Other:  BCBS    Ordering/Referring Provider: Puma Hannah PA-C     (If ordering provider performs procedure, schedule with ordering provider unless otherwise instructed. )    BMI: Estimated body mass index is 29.67 kg/m  as calculated from the following:    Height as of 4/17/24: 1.727 m (5' 7.99\").    Weight as of 4/17/24: 88.5 kg (195 lb 1.6 oz).     Sedation Ordered  moderate sedation.   If patient BMI > 50 do not schedule in [facility].    If patient BMI > 45 do not schedule at [facility].    Are you taking methadone or Suboxone?  No    Have you had difficulties, pain, or discomfort during past endoscopy procedures?  No    Are you taking any prescription medications for pain 3 or more times per week?   YES, RN review needed to determine if MAC is required.  (RN Review required.)     Do you have a history of malignant hyperthermia?  No    (Females) Are you currently pregnant?   No     Have you been diagnosed or told you have pulmonary hypertension?   No    Do you have an LVAD?  No    Have you been told you have moderate to severe sleep apnea?  No    Have you been told you have COPD, asthma, or any other lung disease?  No    Do you have any heart conditions?  No     Have you ever had or are you waiting for an organ transplant?  No. Continue scheduling, no site restrictions.    Have you had a stroke or transient ischemic attack (TIA aka \"mini stroke\" in the last 6 months?   No    Have you been diagnosed with or been told you have cirrhosis of the liver?   No    Are you currently on dialysis?   No    Do you need assistance transferring?   No    BMI: Estimated body mass index is 29.67 kg/m  as calculated from the following:    Height as of 4/17/24: 1.727 m (5' 7.99\").    Weight as of 4/17/24: 88.5 kg (195 lb 1.6 oz). "     Is patients BMI > 40 and scheduling location [facility]?  No    Do you take an injectable medication for weight loss or diabetes (excluding insulin)?  No    Do you take the medication Naltrexone?  No    Do you take blood thinners?  No       Prep   Are you currently on dialysis or do you have chronic kidney disease?  No    Do you have a diagnosis of diabetes?  No    Do you have a diagnosis of cystic fibrosis (CF)?  No    On a regular basis do you go 3 -5 days between bowel movements?  No    BMI > 40?  No    Preferred Pharmacy:      [id number] [place] [id number] [facility]  [facility] & [address] [place] [address]  Phone: [phone] Fax: [phone]      Final Scheduling Details     Procedure scheduled  Upper endoscopy (EGD)    Surgeon:       Date of procedure:  8/28     Pre-OP / PAC:   No - Not required for this site.    Location  [facility] - [facility] - Patient preference.    Sedation   MAC/Deep Sedation - Per RN assessment.      Patient Reminders:   You will receive a call from a Nurse to review instructions and health history.  This assessment must be completed prior to your procedure.  Failure to complete the Nurse assessment may result in the procedure being cancelled.      On the day of your procedure, please designate an adult(s) who can drive you home stay with you for the next 24 hours. The medicines used in the exam will make you sleepy. You will not be able to drive.      You cannot take public transportation, ride share services, or non-medical taxi service without a responsible caregiver.  Medical transport services are allowed with the requirement that a responsible caregiver will receive you at your destination.  We require that drivers and caregivers are confirmed prior to your procedure.

## 2024-04-25 NOTE — TELEPHONE ENCOUNTER
Pre Assessment RN Review    Focused Assessments    Pain Medication Review    Per scheduling questionnaire, patient reports taking prescription pain medication three or more times per week.    Per chart review, patient does have an active prescription for an opioid medication. Prescribed Medication(s): Tramadol BID and prn oxycodone      Scheduling Status & Recommendations    Sedation: MAC/Deep Sedation - Per RN assessment.

## 2024-05-16 ENCOUNTER — TRANSFERRED RECORDS (OUTPATIENT)
Dept: HEALTH INFORMATION MANAGEMENT | Facility: CLINIC | Age: 59
End: 2024-05-16
Payer: COMMERCIAL

## 2024-05-29 ENCOUNTER — ANCILLARY PROCEDURE (OUTPATIENT)
Dept: NUCLEAR MEDICINE | Facility: CLINIC | Age: 59
End: 2024-05-29
Attending: PHYSICIAN ASSISTANT
Payer: COMMERCIAL

## 2024-05-29 DIAGNOSIS — R10.12 LUQ ABDOMINAL PAIN: ICD-10-CM

## 2024-05-29 DIAGNOSIS — R14.0 ABDOMINAL BLOATING: ICD-10-CM

## 2024-05-29 DIAGNOSIS — R11.2 NAUSEA AND VOMITING, UNSPECIFIED VOMITING TYPE: ICD-10-CM

## 2024-05-29 DIAGNOSIS — K52.9 CHRONIC DIARRHEA: ICD-10-CM

## 2024-05-29 PROCEDURE — 78264 GASTRIC EMPTYING IMG STUDY: CPT | Mod: GC | Performed by: RADIOLOGY

## 2024-05-29 PROCEDURE — A9541 TC99M SULFUR COLLOID: HCPCS | Performed by: RADIOLOGY

## 2024-06-04 NOTE — RESULT ENCOUNTER NOTE
Hi Chip,    Your recent gastric emptying scan results are available for you to review.  This test showed that your stomach empties normally, which rules out gastroparesis as a source of your symptoms.    I will be in touch after I see the results of your endoscopy.    Please let me know if you have any questions.    Sincerely,  Puma MALIN [facility] GI, Hepatology, and Nutrition

## 2024-06-13 ENCOUNTER — TRANSFERRED RECORDS (OUTPATIENT)
Dept: HEALTH INFORMATION MANAGEMENT | Facility: CLINIC | Age: 59
End: 2024-06-13
Payer: COMMERCIAL

## 2024-08-16 DIAGNOSIS — R11.2 NAUSEA AND VOMITING, UNSPECIFIED VOMITING TYPE: ICD-10-CM

## 2024-08-16 RX ORDER — HYDROXYZINE HYDROCHLORIDE 25 MG/1
TABLET, FILM COATED ORAL
Qty: 60 TABLET | Refills: 1 | Status: SHIPPED | OUTPATIENT
Start: 2024-08-16

## 2024-08-20 ENCOUNTER — TELEPHONE (OUTPATIENT)
Dept: GASTROENTEROLOGY | Facility: CLINIC | Age: 59
End: 2024-08-20
Payer: COMMERCIAL

## 2024-08-20 NOTE — TELEPHONE ENCOUNTER
Pre visit planning completed.      Procedure details:    Patient scheduled for Upper endoscopy (EGD) on 8/28/24.     Arrival time: 1300. Procedure time 1400    Facility location: [facility]; [address] [place] [address], [address] Floor, [place] [address]. Check in location: 5th Floor.    Sedation type: MAC    Pre op exam needed? No.    Indication for procedure:   Chronic diarrhea [K52.9]  - Primary      Nausea and vomiting, unspecified vomiting type [R11.2]      Abdominal bloating [R14.0]      LUQ abdominal pain             Chart review:     Electronic implanted devices? No    Recent diagnosis of diverticulitis within the last 6 weeks? No      Medication review:    Diabetic? Prediabetic.     Anticoagulants? No    Weight loss medication/injectable? No GLP-1 medication per patient's medication list.  RN will verify with pre-assessment call.    NSAIDS? No    Other medication HOLDING recommendations:  N/A      Prep for procedure:     Prep instructions sent via ginna Mehta RN  Endoscopy Procedure Pre Assessment RN  [phone] option 4

## 2024-08-20 NOTE — TELEPHONE ENCOUNTER
Pre assessment completed for upcoming procedure.      Procedure details:    Patient scheduled for Upper endoscopy (EGD) on 8/28/24.     Arrival time: 1300. Procedure time 1400    Facility location: [facility]; [address] [place] [address], [address] Floor, [place] [address]. Check in location: 5th Floor.    Sedation type: MAC    Pre op exam needed? No.    Indication for procedure:   Chronic diarrhea [K52.9]  - Primary      Nausea and vomiting, unspecified vomiting type [R11.2]      Abdominal bloating [R14.0]      LUQ abdominal pain           Designated  policy reviewed. Instructed to have someone stay 24 hours post procedure.       Chart review:     Electronic implanted devices? No    Recent diagnosis of diverticulitis within the last 6 weeks?  No      Medication review:    Diabetic? Prediabetic.     Anticoagulants? No    Weight loss medication/injectable? No.    NSAIDS? No    Other medication HOLDING recommendations:  N/A      Prep for procedure:     Prep instructions sent via Kato     Reviewed procedure prep instructions.     Patient verbalized understanding and had no questions or concerns at this time.        Dianna Putnam RN  Endoscopy Procedure Pre Assessment   [phone] option 4

## 2024-08-27 ENCOUNTER — TELEPHONE (OUTPATIENT)
Dept: GASTROENTEROLOGY | Facility: CLINIC | Age: 59
End: 2024-08-27
Payer: COMMERCIAL

## 2024-08-28 ENCOUNTER — HOSPITAL ENCOUNTER (OUTPATIENT)
Facility: AMBULATORY SURGERY CENTER | Age: 59
Discharge: HOME | End: 2024-08-28
Attending: INTERNAL MEDICINE
Payer: COMMERCIAL

## 2024-08-28 ENCOUNTER — ANESTHESIA (OUTPATIENT)
Dept: SURGERY | Facility: AMBULATORY SURGERY CENTER | Age: 59
End: 2024-08-28
Payer: COMMERCIAL

## 2024-08-28 ENCOUNTER — ANESTHESIA EVENT (OUTPATIENT)
Dept: SURGERY | Facility: AMBULATORY SURGERY CENTER | Age: 59
End: 2024-08-28
Payer: COMMERCIAL

## 2024-08-28 VITALS
WEIGHT: 195 LBS | RESPIRATION RATE: 16 BRPM | HEIGHT: 67 IN | DIASTOLIC BLOOD PRESSURE: 70 MMHG | BODY MASS INDEX: 30.61 KG/M2 | SYSTOLIC BLOOD PRESSURE: 120 MMHG | HEART RATE: 80 BPM | OXYGEN SATURATION: 98 % | TEMPERATURE: 96.7 F

## 2024-08-28 VITALS — HEART RATE: 90 BPM

## 2024-08-28 LAB — UPPER GI ENDOSCOPY: NORMAL

## 2024-08-28 PROCEDURE — 88305 TISSUE EXAM BY PATHOLOGIST: CPT | Mod: TC | Performed by: INTERNAL MEDICINE

## 2024-08-28 PROCEDURE — 43239 EGD BIOPSY SINGLE/MULTIPLE: CPT | Performed by: ANESTHESIOLOGY

## 2024-08-28 PROCEDURE — 88305 TISSUE EXAM BY PATHOLOGIST: CPT | Mod: 26 | Performed by: PATHOLOGY

## 2024-08-28 PROCEDURE — 43239 EGD BIOPSY SINGLE/MULTIPLE: CPT | Performed by: NURSE ANESTHETIST, CERTIFIED REGISTERED

## 2024-08-28 PROCEDURE — 43239 EGD BIOPSY SINGLE/MULTIPLE: CPT | Performed by: INTERNAL MEDICINE

## 2024-08-28 RX ORDER — PROPOFOL 10 MG/ML
INJECTION, EMULSION INTRAVENOUS PRN
Status: DISCONTINUED | OUTPATIENT
Start: 2024-08-28 | End: 2024-08-28

## 2024-08-28 RX ORDER — LIDOCAINE HYDROCHLORIDE 20 MG/ML
INJECTION, SOLUTION INFILTRATION; PERINEURAL PRN
Status: DISCONTINUED | OUTPATIENT
Start: 2024-08-28 | End: 2024-08-28

## 2024-08-28 RX ORDER — NALOXONE HYDROCHLORIDE 0.4 MG/ML
0.4 INJECTION, SOLUTION INTRAMUSCULAR; INTRAVENOUS; SUBCUTANEOUS
Status: DISCONTINUED | OUTPATIENT
Start: 2024-08-28 | End: 2024-08-29 | Stop reason: HOSPADM

## 2024-08-28 RX ORDER — ONDANSETRON 4 MG/1
4 TABLET, ORALLY DISINTEGRATING ORAL EVERY 6 HOURS PRN
Status: DISCONTINUED | OUTPATIENT
Start: 2024-08-28 | End: 2024-08-29 | Stop reason: HOSPADM

## 2024-08-28 RX ORDER — ONDANSETRON 2 MG/ML
4 INJECTION INTRAMUSCULAR; INTRAVENOUS
Status: DISCONTINUED | OUTPATIENT
Start: 2024-08-28 | End: 2024-08-28 | Stop reason: HOSPADM

## 2024-08-28 RX ORDER — LIDOCAINE 40 MG/G
CREAM TOPICAL
Status: DISCONTINUED | OUTPATIENT
Start: 2024-08-28 | End: 2024-08-28 | Stop reason: HOSPADM

## 2024-08-28 RX ORDER — PROCHLORPERAZINE MALEATE 10 MG
10 TABLET ORAL EVERY 6 HOURS PRN
Status: DISCONTINUED | OUTPATIENT
Start: 2024-08-28 | End: 2024-08-29 | Stop reason: HOSPADM

## 2024-08-28 RX ORDER — NALOXONE HYDROCHLORIDE 0.4 MG/ML
0.2 INJECTION, SOLUTION INTRAMUSCULAR; INTRAVENOUS; SUBCUTANEOUS
Status: DISCONTINUED | OUTPATIENT
Start: 2024-08-28 | End: 2024-08-29 | Stop reason: HOSPADM

## 2024-08-28 RX ORDER — FLUMAZENIL 0.1 MG/ML
0.2 INJECTION, SOLUTION INTRAVENOUS
Status: DISCONTINUED | OUTPATIENT
Start: 2024-08-28 | End: 2024-08-29 | Stop reason: HOSPADM

## 2024-08-28 RX ORDER — ONDANSETRON 2 MG/ML
4 INJECTION INTRAMUSCULAR; INTRAVENOUS EVERY 6 HOURS PRN
Status: DISCONTINUED | OUTPATIENT
Start: 2024-08-28 | End: 2024-08-29 | Stop reason: HOSPADM

## 2024-08-28 RX ORDER — PROPOFOL 10 MG/ML
INJECTION, EMULSION INTRAVENOUS CONTINUOUS PRN
Status: DISCONTINUED | OUTPATIENT
Start: 2024-08-28 | End: 2024-08-28

## 2024-08-28 RX ORDER — SODIUM CHLORIDE, SODIUM LACTATE, POTASSIUM CHLORIDE, CALCIUM CHLORIDE 600; 310; 30; 20 MG/100ML; MG/100ML; MG/100ML; MG/100ML
INJECTION, SOLUTION INTRAVENOUS CONTINUOUS PRN
Status: DISCONTINUED | OUTPATIENT
Start: 2024-08-28 | End: 2024-08-28

## 2024-08-28 RX ADMIN — PROPOFOL 100 MG: 10 INJECTION, EMULSION INTRAVENOUS at 14:29

## 2024-08-28 RX ADMIN — PROPOFOL 200 MCG/KG/MIN: 10 INJECTION, EMULSION INTRAVENOUS at 14:27

## 2024-08-28 RX ADMIN — SODIUM CHLORIDE, SODIUM LACTATE, POTASSIUM CHLORIDE, CALCIUM CHLORIDE: 600; 310; 30; 20 INJECTION, SOLUTION INTRAVENOUS at 14:24

## 2024-08-28 RX ADMIN — LIDOCAINE HYDROCHLORIDE 100 MG: 20 INJECTION, SOLUTION INFILTRATION; PERINEURAL at 14:29

## 2024-08-28 NOTE — ANESTHESIA CARE TRANSFER NOTE
Patient: Pop Eaton III    Procedure: Procedure(s):  ESOPHAGOGASTRODUODENOSCOPY, WITH BIOPSY       Diagnosis: Chronic diarrhea [K52.9]  Nausea and vomiting, unspecified vomiting type [R11.2]  Abdominal bloating [R14.0]  LUQ abdominal pain [R10.12]  Diagnosis Additional Information: No value filed.    Anesthesia Type:   MAC     Note:      Level of Consciousness: drowsy  Oxygen Supplementation: room air    Independent Airway: airway patency satisfactory and stable        Patient transferred to: Phase II    Handoff Report: Identifed the Patient, Identified the Reponsible Provider, Reviewed the pertinent medical history, Discussed the surgical course, Reviewed Intra-OP anesthesia mangement and issues during anesthesia, Set expectations for post-procedure period and Allowed opportunity for questions and acknowledgement of understanding  Vitals:  Vitals Value Taken Time   BP     Temp     Pulse     Resp     SpO2         Electronically Signed By: ADRIEN Cuevas CRNA  August 28, 2024  2:40 PM

## 2024-08-28 NOTE — H&P
Pop Eaton III  [id number]  male  58 year old      Reason for procedure/surgery: LUQ pain, nausea, vomiting,     Patient Active Problem List   Diagnosis    History of colonic polyps, 2/2017    Family history of prostate cancer    Restless legs syndrome    Mild major depression (H)    SNHL (sensory-neural hearing loss), asymmetrical    Tinnitus, bilateral    Major depressive disorder, recurrent episode, moderate (H)    Melanoma of skin (H)    Subcoracoid bursitis of left shoulder    Scapular dyskinesis    Rotator cuff impingement syndrome of left shoulder    Patellofemoral syndrome, right    Left knee pain    Impingement syndrome, shoulder, right    Cervical spinal stenosis    Cervical pseudoarthrosis (H)    Biliary colic    Biceps tendinitis of left shoulder    Aftercare following surgery    Aftercare following surgery of the musculoskeletal system    Acute medial meniscus tear of right knee    Tobacco use disorder    Hepatic steatosis    Prediabetes       Past Surgical History:    Past Surgical History:   Procedure Laterality Date    ARTHROSCOPY KNEE RT/LT Left 2013    meniscal tear     ARTHROSCOPY KNEE RT/LT Right 10/2016    medial meniscal tear     COLONOSCOPY  02/2017    Q 3 years for polyps    COLONOSCOPY N/A 12/19/2023    Procedure: COLONOSCOPY, WITH POLYPECTOMY;  Surgeon: Gulshan Braden MD;  Location: [facility]    COLONOSCOPY WITH CO2 INSUFFLATION N/A 02/15/2017    Procedure: COLONOSCOPY WITH CO2 INSUFFLATION;  Surgeon: Galileo Lezama DO;  Location: [facility]    HERNIA REPAIR  2010    LAPAROSCOPIC APPENDECTOMY  2010    LAPAROSCOPIC CHOLECYSTECTOMY  2010    SURGICAL HISTORY OF -  Left 1995    corneal transplant X 2 on left eye       Past Medical History: History reviewed. No pertinent past medical history.    Social History:   Social History     Tobacco Use    Smoking status: Light Smoker     Current packs/day: 0.04     Types: Cigarettes    Smokeless tobacco: Never    Tobacco comments:     5-8 cigs per  "day   Substance Use Topics    Alcohol use: Yes     Comment: 1-2 drinks per year; no hx of alcohol abuse       Family History:   Family History   Problem Relation Age of Onset    Prostate Cancer Maternal Uncle         X2    Liver Disease No family hx of     Liver Cancer No family hx of        Allergies:   Allergies   Allergen Reactions    Wellbutrin [Bupropion]        Active Medications:   Current Outpatient Medications   Medication Sig Dispense Refill    ondansetron (ZOFRAN ODT) 8 MG ODT tab Take 1 tablet (8 mg) by mouth every 8 hours as needed for nausea 20 tablet 1    oxyCODONE-acetaminophen (PERCOCET) 5-325 MG tablet Take 1 tablet by mouth daily.      hydrOXYzine HCl (ATARAX) 25 MG tablet TAKE 1 TABLET(25 MG) BY MOUTH THREE TIMES DAILY AS NEEDED FOR ITCHING OR NAUSEA OR VOMITING 60 tablet 1    traMADol (ULTRAM) 50 MG tablet Take 50 mg by mouth 2 times daily         Systemic Review:   CONSTITUTIONAL: NEGATIVE for fever, chills, change in weight  ENT/MOUTH: NEGATIVE for ear, mouth and throat problems  RESP: NEGATIVE for significant cough or SOB  CV: NEGATIVE for chest pain, palpitations or peripheral edema    Physical Examination:   Vital Signs: /74   Pulse 88   Temp (!) 96.7  F (35.9  C) (Temporal)   Resp 16   Ht 1.702 m (5' 7\")   Wt 88.5 kg (195 lb)   SpO2 95%   BMI 30.54 kg/m    GENERAL: healthy, alert and no distress  NECK: no adenopathy, no asymmetry, masses, or scars  RESP: lungs clear to auscultation - no rales, rhonchi or wheezes  CV: regular rate and rhythm, normal S1 S2, no S3 or S4, no murmur, click or rub, no peripheral edema and peripheral pulses strong  ABDOMEN: soft, nontender, no hepatosplenomegaly, no masses and bowel sounds normal  MS: no gross musculoskeletal defects noted, no edema    Plan: Appropriate to proceed as scheduled.      Estefani Diaz MD  8/28/2024    PCP:  Fela Babin  "

## 2024-08-28 NOTE — DISCHARGE INSTRUCTIONS
Discharge Instructions after  Upper Endoscopy (EGD)    Activity and Diet  You were given medicine for pain. You may be dizzy or sleepy.  For 24 hours:   Do not drive or use heavy equipment.   Do not make important decisions.   Do not drink any alcohol.  You may return to your regular diet.    Discomfort  You may have a sore throat for 2 to 3 days. It may help to:   Avoid hot liquids for 24 hours.   Use sore throat lozenges.   Gargle as needed with salt water up to 4 times a day. Mix 1 cup of warm water  with 1 teaspoon of salt. Do not swallow.  If your esophagus was dilated (opened) or banded during the exam:   Drink only cool liquids for the rest of the day. Eat a soft diet for the next few days.   You may have a sore chest for 2 to 3 days.    You may take Tylenol (acetaminophen) for pain unless your doctor has told you not to.    Do not take aspirin or ibuprofen (Advil, Motrin) or other NSAIDS  (anti-inflammatory drugs) for 3-5 days.    Follow-up  If we took small tissue samples for study. If you do not have a follow-up visit scheduled,  call your provider s office in 2 weeks for the results.    Other instructions________________________________________________________    When to call us:  Problems are rare. Call right away if you have:   Unusual throat pain or trouble swallowing   Unusual pain in belly or chest that is not relieved by belching or passing air   Black stools (tar-like looking bowel movement)   Temperature above 100.6  F. (37.5  C).    If you vomit blood or have severe pain, go to an emergency room.    If you have questions, call:  Monday to Friday, 8 a.m. to 4:30 p.m.: Central Scheduling Department:[phone]    After hours: Hospital: [phone] (Ask for the GI fellow on call)

## 2024-08-28 NOTE — ANESTHESIA POSTPROCEDURE EVALUATION
Patient: Pop Eaton III    Procedure: Procedure(s):  ESOPHAGOGASTRODUODENOSCOPY, WITH BIOPSY       Anesthesia Type:  MAC    Note:  Disposition: Outpatient   Postop Pain Control: Uneventful            Sign Out: Well controlled pain   PONV: No   Neuro/Psych: Uneventful            Sign Out: Acceptable/Baseline neuro status   Airway/Respiratory: Uneventful            Sign Out: Acceptable/Baseline resp. status   CV/Hemodynamics: Uneventful            Sign Out: Acceptable CV status; No obvious hypovolemia; No obvious fluid overload   Other NRE: NONE   DID A NON-ROUTINE EVENT OCCUR? No       Last vitals:  Vitals Value Taken Time   /72 08/28/24 1510   Temp     Pulse 82 08/28/24 1510   Resp 16 08/28/24 1510   SpO2 97 % 08/28/24 1510       Electronically Signed By: Tiffany Davila MD  August 28, 2024  3:18 PM

## 2024-08-28 NOTE — ANESTHESIA PREPROCEDURE EVALUATION
Anesthesia Pre-Procedure Evaluation    Patient: Pop Eaton III   MRN: [id number] : 1965        Procedure : Procedure(s):  Esophagoscopy, gastroscopy, duodenoscopy (EGD), combined          History reviewed. No pertinent past medical history.   Past Surgical History:   Procedure Laterality Date     ARTHROSCOPY KNEE RT/LT Left     meniscal tear      ARTHROSCOPY KNEE RT/LT Right 10/2016    medial meniscal tear      COLONOSCOPY  2017    Q 3 years for polyps     COLONOSCOPY N/A 2023    Procedure: COLONOSCOPY, WITH POLYPECTOMY;  Surgeon: Gulshan Braden MD;  Location: [facility]     COLONOSCOPY WITH CO2 INSUFFLATION N/A 02/15/2017    Procedure: COLONOSCOPY WITH CO2 INSUFFLATION;  Surgeon: Galileo Lezama DO;  Location: [facility]     HERNIA REPAIR       LAPAROSCOPIC APPENDECTOMY       LAPAROSCOPIC CHOLECYSTECTOMY       SURGICAL HISTORY OF -  Left     corneal transplant X 2 on left eye      Allergies   Allergen Reactions     Wellbutrin [Bupropion]       Social History     Tobacco Use     Smoking status: Light Smoker     Current packs/day: 0.04     Types: Cigarettes     Smokeless tobacco: Never     Tobacco comments:     5-8 cigs per day   Substance Use Topics     Alcohol use: Yes     Comment: 1-2 drinks per year; no hx of alcohol abuse      Wt Readings from Last 1 Encounters:   24 88.5 kg (195 lb)        Anesthesia Evaluation   Pt has had prior anesthetic. Type: General and MAC.        ROS/MED HX  ENT/Pulmonary:  - neg pulmonary ROS     Neurologic: Comment: Vertigo      Cardiovascular:  - neg cardiovascular ROS     METS/Exercise Tolerance: 3 - Able to walk 1-2 blocks without stopping    Hematologic:  - neg hematologic  ROS     Musculoskeletal:   (+)  arthritis,             GI/Hepatic:     (+) GERD, Other,           liver disease,       Renal/Genitourinary:  - neg Renal ROS     Endo:  - neg endo ROS     Psychiatric/Substance Use:     (+) psychiatric history anxiety, depression and  "other (comment) (PTSD) alcohol abuse  Recreational drug usage: Cannabis.    Infectious Disease:  - neg infectious disease ROS     Malignancy:  - neg malignancy ROS     Other:            Physical Exam    Airway      Comment: Full dense beard    Mallampati: II   TM distance: > 3 FB   Neck ROM: full   Mouth opening: > 3 cm    Respiratory Devices and Support         Dental       (+) Multiple visibly decayed, broken teeth      Cardiovascular   cardiovascular exam normal       Rhythm and rate: regular and normal     Pulmonary   pulmonary exam normal        breath sounds clear to auscultation       OUTSIDE LABS:  CBC:   Lab Results   Component Value Date    WBC 13.2 (H) 04/17/2024    WBC 13.9 (H) 03/27/2024    HGB 17.8 (H) 04/17/2024    HGB 16.7 03/27/2024    HCT 51.1 04/17/2024    HCT 48.1 03/27/2024     04/17/2024     03/27/2024     BMP:   Lab Results   Component Value Date     04/17/2024     02/12/2024    POTASSIUM 3.8 04/17/2024    POTASSIUM 4.4 02/12/2024    CHLORIDE 105 04/17/2024    CHLORIDE 103 02/12/2024    CO2 26 04/17/2024    CO2 25 02/12/2024    BUN 7.1 04/17/2024    BUN 7.9 02/12/2024    CR 1.04 04/17/2024    CR 1.07 02/12/2024    GLC 94 04/17/2024    GLC 96 02/12/2024     COAGS:   Lab Results   Component Value Date    INR 1.00 04/17/2024     POC: No results found for: \"BGM\", \"HCG\", \"HCGS\"  HEPATIC:   Lab Results   Component Value Date    ALBUMIN 4.8 04/17/2024    PROTTOTAL 7.9 04/17/2024    ALT 51 04/17/2024    AST 35 04/17/2024    ALKPHOS 104 04/17/2024    BILITOTAL 0.4 04/17/2024     OTHER:   Lab Results   Component Value Date    A1C 5.8 (H) 02/12/2024    JANE 9.7 04/17/2024    MAG 2.5 (H) 07/30/2018    LIPASE 23 03/27/2024    AMYLASE 51 03/27/2024    TSH 1.91 04/17/2024    CRP 3.1 05/07/2019       Anesthesia Plan    ASA Status:  3    NPO Status:  NPO Appropriate    Anesthesia Type: MAC.     - Reason for MAC: straight local not clinically adequate, immobility needed          " "    Consents    Anesthesia Plan(s) and associated risks, benefits, and realistic alternatives discussed. Questions answered and patient/representative(s) expressed understanding.     - Discussed:     - Discussed with:  Patient            Postoperative Care    Pain management: Multi-modal analgesia.   PONV prophylaxis: Ondansetron (or other 5HT-3), Background Propofol Infusion     Comments:               Tiffany Davila MD    I have reviewed the pertinent notes and labs in the chart from the past 30 days and (re)examined the patient.  Any updates or changes from those notes are reflected in this note.              # Obesity: Estimated body mass index is 30.54 kg/m  as calculated from the following:    Height as of this encounter: 1.702 m (5' 7\").    Weight as of this encounter: 88.5 kg (195 lb).      "

## 2024-08-29 LAB
PATH REPORT.COMMENTS IMP SPEC: NORMAL
PATH REPORT.FINAL DX SPEC: NORMAL
PATH REPORT.GROSS SPEC: NORMAL
PATH REPORT.MICROSCOPIC SPEC OTHER STN: NORMAL
PATH REPORT.RELEVANT HX SPEC: NORMAL
PHOTO IMAGE: NORMAL

## 2024-09-03 ENCOUNTER — TRANSFERRED RECORDS (OUTPATIENT)
Dept: HEALTH INFORMATION MANAGEMENT | Facility: CLINIC | Age: 59
End: 2024-09-03
Payer: COMMERCIAL

## 2024-09-18 ENCOUNTER — PATIENT OUTREACH (OUTPATIENT)
Dept: GASTROENTEROLOGY | Facility: CLINIC | Age: 59
End: 2024-09-18
Payer: COMMERCIAL

## 2024-09-18 DIAGNOSIS — Z12.11 SPECIAL SCREENING FOR MALIGNANT NEOPLASMS, COLON: Primary | ICD-10-CM

## 2024-09-18 NOTE — PROGRESS NOTES
"Patient has a history of polyps and surveillance colonoscopy is due December 2024. Patient meets criteria for CRC high risk standing order protocol.    CRC Screening Colonoscopy Referral Review    Patient meets the inclusion criteria for screening colonoscopy standing order.    Ordering/Referring Provider:  ADRIEN Pineda CNP    BMI: Estimated body mass index is 30.54 kg/m  as calculated from the following:    Height as of 8/28/24: 1.702 m (5' 7\").    Weight as of 8/28/24: 88.5 kg (195 lb).     Sedation:  Does patient have any of the following conditions affecting sedation?  Hx of severe PTSD, anxiety, or psychosis: MAC sedation recommended    Previous Scopes:  Any previous recommendations or follow up needs based on previous scope?  na / No recommendations.    Medical Concerns to Postpone Order:  Does patient have any of the following medical concerns that should postpone/delay colonoscopy referral?  No medical conditions affecting colonoscopy referral.    Final Referral Details:  Based on patient's medical history patient is appropriate for referral order with MAC/deep sedation.   BMI<= 45 45 < BMI <= 48 48 < BMI < = 50  BMI > 50   No Restrictions No [facility]  [facility]  [facility] with exceptions Hospital Only OR Only     "

## 2024-09-20 ENCOUNTER — OFFICE VISIT (OUTPATIENT)
Dept: INTERNAL MEDICINE | Facility: CLINIC | Age: 59
End: 2024-09-20
Payer: COMMERCIAL

## 2024-09-20 VITALS
TEMPERATURE: 98 F | RESPIRATION RATE: 18 BRPM | OXYGEN SATURATION: 97 % | BODY MASS INDEX: 28.95 KG/M2 | WEIGHT: 191 LBS | SYSTOLIC BLOOD PRESSURE: 137 MMHG | HEART RATE: 69 BPM | HEIGHT: 68 IN | DIASTOLIC BLOOD PRESSURE: 86 MMHG

## 2024-09-20 DIAGNOSIS — F33.1 MAJOR DEPRESSIVE DISORDER, RECURRENT EPISODE, MODERATE (H): ICD-10-CM

## 2024-09-20 DIAGNOSIS — K46.9 NON-RECURRENT ABDOMINAL HERNIA WITHOUT OBSTRUCTION OR GANGRENE, UNSPECIFIED HERNIA TYPE: Primary | ICD-10-CM

## 2024-09-20 DIAGNOSIS — K76.0 HEPATIC STEATOSIS: ICD-10-CM

## 2024-09-20 DIAGNOSIS — R73.03 PREDIABETES: ICD-10-CM

## 2024-09-20 PROCEDURE — 99214 OFFICE O/P EST MOD 30 MIN: CPT

## 2024-09-20 ASSESSMENT — PATIENT HEALTH QUESTIONNAIRE - PHQ9
10. IF YOU CHECKED OFF ANY PROBLEMS, HOW DIFFICULT HAVE THESE PROBLEMS MADE IT FOR YOU TO DO YOUR WORK, TAKE CARE OF THINGS AT HOME, OR GET ALONG WITH OTHER PEOPLE: VERY DIFFICULT
SUM OF ALL RESPONSES TO PHQ QUESTIONS 1-9: 21
SUM OF ALL RESPONSES TO PHQ QUESTIONS 1-9: 21

## 2024-09-20 NOTE — PROGRESS NOTES
"  Assessment & Plan     (K46.9) Non-recurrent abdominal hernia without obstruction or gangrene, unspecified hernia type  (primary encounter diagnosis)  Comment: Discussed with patient need for referral for abdominal hernia. Patient has been dealing with chronic abdominal pain and is feeling that the hernia me be the issue as all other avenues have not provided any results on cause.   Plan: Adult Gen Surg  Referral        Future     (K76.0) Hepatic steatosis  Comment: Chronic, stable.   Plan: No change in current plan of care.     (R73.03) Prediabetes  Comment: Chronic, stable   Plan: No change in current plan of care.     (F33.1) Major depressive disorder, recurrent episode, moderate (H)  Comment: Chronic, unstable. Discussed that patient is trying to do follow up with psychiatry to help manage symptoms discussed pharmacogenetics.    Plan: Seek follow up with psychiatry        BMI  Estimated body mass index is 29.04 kg/m  as calculated from the following:    Height as of this encounter: 1.727 m (5' 8\").    Weight as of this encounter: 86.6 kg (191 lb).         CONSULTATION/REFERRAL to Surgery    Jaden Gentile is a 58 year old, presenting for the following health issues:  Abdominal Pain        9/20/2024     9:27 AM   Additional Questions   Roomed by Emely GUERRERO     History of Present Illness       Reason for visit:  Stomach pain    He eats 2-3 servings of fruits and vegetables daily.He consumes 2 sweetened beverage(s) daily.He exercises with enough effort to increase his heart rate 30 to 60 minutes per day.  He exercises with enough effort to increase his heart rate 4 days per week.   He is taking medications regularly.                 Review of Systems  Constitutional, HEENT, cardiovascular, pulmonary, gi and gu systems are negative, except as otherwise noted.      Objective    /86 (BP Location: Right arm, Patient Position: Sitting, Cuff Size: Adult Regular)   Pulse 69   Temp 98  F (36.7  C) " "(Temporal)   Resp 18   Ht 1.727 m (5' 8\")   Wt 86.6 kg (191 lb)   SpO2 97%   BMI 29.04 kg/m    Body mass index is 29.04 kg/m .  Physical Exam   GENERAL: alert and no distress  NECK: no adenopathy, no asymmetry, masses, or scars  RESP: lungs clear to auscultation - no rales, rhonchi or wheezes  CV: regular rate and rhythm, normal S1 S2, no S3 or S4, no murmur, click or rub, no peripheral edema  ABDOMEN: soft, nontender, no hepatosplenomegaly, no masses and bowel sounds normal  MS: no gross musculoskeletal defects noted, no edema            Signed Electronically by: ADRIEN Pineda CNP    "

## 2024-09-26 ENCOUNTER — MYC MEDICAL ADVICE (OUTPATIENT)
Dept: GASTROENTEROLOGY | Facility: CLINIC | Age: 59
End: 2024-09-26

## 2024-09-26 DIAGNOSIS — R14.0 ABDOMINAL BLOATING: ICD-10-CM

## 2024-09-26 DIAGNOSIS — R11.2 NAUSEA AND VOMITING, UNSPECIFIED VOMITING TYPE: Primary | ICD-10-CM

## 2024-10-01 ENCOUNTER — TRANSFERRED RECORDS (OUTPATIENT)
Dept: HEALTH INFORMATION MANAGEMENT | Facility: CLINIC | Age: 59
End: 2024-10-01
Payer: COMMERCIAL

## 2024-10-01 RX ORDER — METOCLOPRAMIDE 5 MG/1
5 TABLET ORAL 3 TIMES DAILY PRN
Qty: 30 TABLET | Refills: 0 | Status: SHIPPED | OUTPATIENT
Start: 2024-10-01

## 2024-10-08 ENCOUNTER — LAB (OUTPATIENT)
Dept: LAB | Facility: CLINIC | Age: 59
End: 2024-10-08
Attending: PHYSICIAN ASSISTANT
Payer: COMMERCIAL

## 2024-10-08 ENCOUNTER — OFFICE VISIT (OUTPATIENT)
Dept: GASTROENTEROLOGY | Facility: CLINIC | Age: 59
End: 2024-10-08
Attending: PHYSICIAN ASSISTANT
Payer: COMMERCIAL

## 2024-10-08 ENCOUNTER — PATIENT OUTREACH (OUTPATIENT)
Dept: ONCOLOGY | Facility: CLINIC | Age: 59
End: 2024-10-08

## 2024-10-08 VITALS
BODY MASS INDEX: 28.92 KG/M2 | TEMPERATURE: 98 F | OXYGEN SATURATION: 95 % | DIASTOLIC BLOOD PRESSURE: 90 MMHG | HEIGHT: 68 IN | HEART RATE: 76 BPM | SYSTOLIC BLOOD PRESSURE: 161 MMHG | WEIGHT: 190.8 LBS | RESPIRATION RATE: 18 BRPM

## 2024-10-08 DIAGNOSIS — R79.89 LFT ELEVATION: ICD-10-CM

## 2024-10-08 DIAGNOSIS — K76.0 HEPATIC STEATOSIS: Primary | ICD-10-CM

## 2024-10-08 DIAGNOSIS — K76.0 HEPATIC STEATOSIS: ICD-10-CM

## 2024-10-08 DIAGNOSIS — R79.89 ABNORMAL CBC: ICD-10-CM

## 2024-10-08 LAB
ALBUMIN SERPL BCG-MCNC: 4.6 G/DL (ref 3.5–5.2)
ALP SERPL-CCNC: 103 U/L (ref 40–150)
ALT SERPL W P-5'-P-CCNC: 46 U/L (ref 0–70)
ANION GAP SERPL CALCULATED.3IONS-SCNC: 11 MMOL/L (ref 7–15)
AST SERPL W P-5'-P-CCNC: 40 U/L (ref 0–45)
BILIRUB DIRECT SERPL-MCNC: <0.2 MG/DL (ref 0–0.3)
BILIRUB SERPL-MCNC: 0.5 MG/DL
BUN SERPL-MCNC: 11.9 MG/DL (ref 6–20)
CALCIUM SERPL-MCNC: 9.6 MG/DL (ref 8.8–10.4)
CHLORIDE SERPL-SCNC: 105 MMOL/L (ref 98–107)
CREAT SERPL-MCNC: 1.05 MG/DL (ref 0.67–1.17)
EGFRCR SERPLBLD CKD-EPI 2021: 82 ML/MIN/1.73M2
ERYTHROCYTE [DISTWIDTH] IN BLOOD BY AUTOMATED COUNT: 17.3 % (ref 10–15)
GLUCOSE SERPL-MCNC: 123 MG/DL (ref 70–99)
HCO3 SERPL-SCNC: 25 MMOL/L (ref 22–29)
HCT VFR BLD AUTO: 50.3 % (ref 40–53)
HGB BLD-MCNC: 16.9 G/DL (ref 13.3–17.7)
INR PPP: 0.99 (ref 0.85–1.15)
MCH RBC QN AUTO: 25.6 PG (ref 26.5–33)
MCHC RBC AUTO-ENTMCNC: 33.6 G/DL (ref 31.5–36.5)
MCV RBC AUTO: 76 FL (ref 78–100)
PLATELET # BLD AUTO: 188 10E3/UL (ref 150–450)
POTASSIUM SERPL-SCNC: 4.1 MMOL/L (ref 3.4–5.3)
PROT SERPL-MCNC: 7.3 G/DL (ref 6.4–8.3)
RBC # BLD AUTO: 6.59 10E6/UL (ref 4.4–5.9)
SODIUM SERPL-SCNC: 141 MMOL/L (ref 135–145)
WBC # BLD AUTO: 15.1 10E3/UL (ref 4–11)

## 2024-10-08 PROCEDURE — 85027 COMPLETE CBC AUTOMATED: CPT | Performed by: PATHOLOGY

## 2024-10-08 PROCEDURE — 36415 COLL VENOUS BLD VENIPUNCTURE: CPT | Performed by: PATHOLOGY

## 2024-10-08 PROCEDURE — 82248 BILIRUBIN DIRECT: CPT | Performed by: PATHOLOGY

## 2024-10-08 PROCEDURE — 85610 PROTHROMBIN TIME: CPT | Performed by: PATHOLOGY

## 2024-10-08 PROCEDURE — 99214 OFFICE O/P EST MOD 30 MIN: CPT | Performed by: PHYSICIAN ASSISTANT

## 2024-10-08 PROCEDURE — 99213 OFFICE O/P EST LOW 20 MIN: CPT | Performed by: PHYSICIAN ASSISTANT

## 2024-10-08 PROCEDURE — 80053 COMPREHEN METABOLIC PANEL: CPT | Performed by: PATHOLOGY

## 2024-10-08 ASSESSMENT — PAIN SCALES - GENERAL: PAINLEVEL: EXTREME PAIN (8)

## 2024-10-08 NOTE — Clinical Note
10/8/2024      Pop Eaton III  [address] [facility] [address]      Dear Colleague,    Thank you for referring your patient, Pop Eaton III, to the [facility] [place]. Please see a copy of my visit note below.    Hepatology Clinic Note  Pop Eaton III   Date of Birth 1965    REASON FOR FOLLOW UP: fatty liver, abnormal LFTs         Assessment/plan:     Pop Eaton III is a 60 YO M with PMHx significant for biliary colic, MDD, melanoma, depression, cervical spinal stenosis, RLS, PTSD, hx of colonic polyps and tobacco use disorder who presents for follow up regarding concerns of abnormal LFTs and imaging which has noted hepatic steatosis.     No known underlying liver disease/condition.  Has never had a liver biopsy.  Denies known family hx of liver disease or liver cancer.  Very rare alcohol consumption.     Waxing/waning, mildly elevated LFTs (ALT>AST), Likely 2/2 MAFLD vs other   Hepatic steatosis (S3), likely metabolic associated and not alcohol related   Vomiting, left-sided abdominal pain              > Discussed with pt L sided abd pain is very unlikely to be 2/2 liver etiology   Hx IBS  Recent unintentional weight loss     US abd complete 2/14/24: Increased echogenicity from diffuse fatty infiltration. No focal hepatic lesions. Gallbladder surgically absent. No biliary dilatation. CBD 6 mm. Portions of CBD could not be visualized due to overlying bowel gas.     CT abd/pelvis w contrast 3/3/24: Moderate diffuse hepatic steatosis. Cholecystectomy. No biliary ductal dilatation. Spleen normal. Pancreas normal.      FIB-4: 1.46: AST: 50, ALT: 72, PLT: 230, Age: 58)     Fibroscan 4/17/24: S3 steatosis and F0/F1 fibrosis     Risk factors for metabolic fatty liver disease: Prediabetes, BMI 29, limited routine physical exercise as of late, mixed dyslipidemia     PLAN:  - Reviewed recent labs and imaging  - Pending additional exclusionary liver  labs   - Completed and discussed fibroscan results  - Discussed pathophysiology and natural hx of nonalcoholic fatty liver disease   - Recommend slow gradual weight loss. Discussed how a weight loss of 3-5% can show improvement on steatosis  - Maintain good control of cholesterol (No contraindication to starting a statin with LFT elevations)   - Optimize blood glucose as needed. Could consider GLP-1 inhibitor for both insulin resistance and help with weight loss  - Limit carbs, especially simple carbs, and following Mediterranean eating patten  - Increase physical activity as able  - Continue with rare alcohol use in moderation  - Continue to follow with PCP and GI regarding abdominal pain, nausea, vomiting and irregular bowel issues   - Follow up with me in ~6 months with labs same day    Zoya Coon PA-C  [facility]   -----------------------------------------------------         HPI:     Patient presents today for consult regarding concerns of fatty liver and elevated LFTs.  States all he had today to eat or drink less coffee with cream this morning.  Since December 2023, has been dealing with left-sided abdominal pain that is constant and throbbing.  Patient states since December he has also been dealing with nausea and vomiting.  Over the past 2 months has been vomiting typically daily in the morning.  No history of vomiting blood.  No recent fevers  or chills.  No yellowing of eyes or skin.  Is no longer using Zofran.  States he is taking hydroxyzine for nausea daily which does seem to be effective.  Is no longer on oxycodone either.  Has never been diagnosed with pancreatitis.  Has lost about 35 pounds unintentionally over the past 3-4 months.  Has been trying to do most of his cooking at home as of late.  Has not been doing any routine physical exercise due to pain.  Has never been told he is diabetic.  Has never been on cholesterol-lowering medications.  Was initially told  about fatty liver about 3 months ago.  Has never been told in the past that he has abnormal liver enzymes.       States he establish with a new PCP 2-3 months and is much happier with his ongoing care.     Currently smokes 5-8 cigarettes per day.  No hx of IV drug use.  Occasionally uses THC Gummies for sleep and has done this for the past 2 mo or so.  No hx of alcohol abuse. Drinks 1-2 alcoholic beverages per year.     Abdominal surgical history has included appendectomy, cholecystectomy and hernia repair.     Previous work-up:   HCV antibody: nonreactive   HIV: nonreactive    TTG: negative   IgA: unremarkable   Cholesterol: 129  HDL: 27  LDL: 60  Triglycerides: 208  Hemoglobin A1c: 5.8     HAV Ab total:    HBV Sab:  HBV Sag:   HBV CAb  Alpha-1-antitrypsin  Ceruloplasmin   TSH   SHELDON   F-actin  AMA   Iron panel   Ferritin   Iron Sats:      PMH:    has no past medical history on file.      [facility]:    has a past surgical history that includes Laparoscopic appendectomy (2010); laparoscopic cholecystectomy (2010); arthroscopy knee rt/lt (Left, 2013); surgical history of -  (Left, 1995); arthroscopy knee rt/lt (Right, 10/2016); colonoscopy (02/2017); Colonoscopy with CO2 insufflation (N/A, 02/15/2017); Colonoscopy (N/A, 12/19/2023); and hernia repair (2010).      Medications:   Current Facility-Administered Medications          Current Outpatient Medications   Medication Sig Dispense Refill    hydrOXYzine HCl (ATARAX) 25 MG tablet Take 1 tablet (25 mg) by mouth 3 times daily as needed for other or itching (nausea and vomiting) 60 tablet 1    ondansetron (ZOFRAN ODT) 8 MG ODT tab Take 1 tablet (8 mg) by mouth every 8 hours as needed for nausea 20 tablet 1    oxyCODONE-acetaminophen (PERCOCET) 5-325 MG tablet Take 1 tablet by mouth daily                    Current Facility-Administered Medications   Medication Dose Route Frequency Provider Last Rate Last Admin    triamcinolone (KENALOG-40) injection 40 mg  40 mg  Intra-Lesional Once Ayush Santos MD                         Recent Labs   Lab Test 03/27/24  1204 02/12/24  1235 08/02/23  1436 03/15/23  1533 07/19/22  1422 03/18/22  1414 09/27/21  1212 01/18/21  0915 07/30/18  1037   ALKPHOS 102 76 72 94 79 83 89  --  115   ALT 72* 86* 46 53 90* 91* 52 31 41   AST 50* 44 41 23 58* 54* 31 23 31   BILITOTAL 0.4 0.5 0.3 0.3 0.5 0.2 0.3  --  0.4           Allergies:      Allergies        Allergies   Allergen Reactions    Wellbutrin [Bupropion]                Social History:      Social History   Social History            Socioeconomic History    Marital status:        Spouse name: Not on file    Number of children: Not on file    Years of education: Not on file    Highest education level: Not on file   Occupational History    Not on file   Tobacco Use    Smoking status: Light Smoker       Current packs/day: 0.04       Types: Cigarettes    Smokeless tobacco: Never    Tobacco comments:       5-8 cigs per day   Vaping Use    Vaping status: Never Used   Substance and Sexual Activity    Alcohol use: Yes       Comment: 1-2 drinks per year; no hx of alcohol abuse    Drug use: No       Comment: THC gummies for sleep (use for ~2 mo); no hx IVDU    Sexual activity: Yes       Partners: Female   Other Topics Concern    Parent/sibling w/ CABG, MI or angioplasty before 65F 55M? Not Asked   Social History Narrative    Not on file      Social Determinants of Health           Financial Resource Strain: Low Risk  (11/17/2023)     Financial Resource Strain      Within the past 12 months, have you or your family members you live with been unable to get utilities (heat, electricity) when it was really needed?: No   Food Insecurity: Low Risk  (11/17/2023)     Food Insecurity      Within the past 12 months, did you worry that your food would run out before you got money to buy more?: No      Within the past 12 months, did the food you bought just not last and you didn t have money to get  "more?: No   Transportation Needs: Low Risk  (11/17/2023)     Transportation Needs      Within the past 12 months, has lack of transportation kept you from medical appointments, getting your medicines, non-medical meetings or appointments, work, or from getting things that you need?: No   Physical Activity: Not on file   Stress: Not on file   Social Connections: Unknown (12/27/2021)     [facility]     Social Connections      Frequency of Communication with Friends and Family: Not on file   Interpersonal Safety: Not on file   Housing Stability: Low Risk  (11/17/2023)     Housing Stability      Do you have housing? : Yes      Are you worried about losing your housing?: No              Family History:      Family History         Family History   Problem Relation Age of Onset    Prostate Cancer Maternal Uncle           X2    Liver Disease No family hx of      Liver Cancer No family hx of                Review of Systems:   Gen: See HPI           Physical Exam:   Vital signs:  Temp: 97.8  F (36.6  C) Temp src: Oral BP: 129/87 Pulse: 78   Resp: 18 SpO2: 96 %     Height: 172.7 cm (5' 7.99\") Weight: 88.5 kg (195 lb 1.6 oz)  Estimated body mass index is 29.67 kg/m  as calculated from the following:    Height as of this encounter: 1.727 m (5' 7.99\").    Weight as of this encounter: 88.5 kg (195 lb 1.6 oz).  Gen: A&Ox3, NAD  HEENT: Sclera anicteric   CV: RRR, no overt murmurs  Lung: CTA, no wheezing or crackles.   Abd: soft, TTP in LUQ and epigastric region, ND, no palpable splenomegaly, liver is not palpable.   Ext: no edema, intact pulses.   Skin: No jaundice  Neuro: grossly intact, no asterixis   Psych: appropriate mood and affects          Data:   Reviewed in person and significant for:           Lab Results   Component Value Date      02/12/2024      06/21/2021            Lab Results   Component Value Date     " "POTASSIUM 4.4 02/12/2024     POTASSIUM 3.8 06/21/2021            Lab Results   Component Value Date     CHLORIDE 103 02/12/2024     CHLORIDE 109 06/21/2021            Lab Results   Component Value Date     CO2 25 02/12/2024     CO2 26 06/21/2021            Lab Results   Component Value Date     BUN 7.9 02/12/2024     BUN 11 06/21/2021            Lab Results   Component Value Date     CR 1.07 02/12/2024     CR 1.10 06/21/2021               Lab Results   Component Value Date     WBC 13.9 03/27/2024     WBC 13.7 08/07/2018            Lab Results   Component Value Date     HGB 16.7 03/27/2024     HGB 15.9 06/21/2021            Lab Results   Component Value Date     HCT 48.1 03/27/2024     HCT 47.9 08/07/2018            Lab Results   Component Value Date     MCV 79 03/27/2024     MCV 81 08/07/2018            Lab Results   Component Value Date      03/27/2024      08/07/2018               Lab Results   Component Value Date     AST 50 03/27/2024     AST 23 01/18/2021            Lab Results   Component Value Date     ALT 72 03/27/2024     ALT 31 01/18/2021      No results found for: \"BILICONJ\"         Lab Results   Component Value Date     BILITOTAL 0.4 03/27/2024     BILITOTAL 0.4 07/30/2018               Lab Results   Component Value Date     ALBUMIN 4.6 03/27/2024     ALBUMIN 3.8 03/15/2023     ALBUMIN 3.8 07/30/2018            Lab Results   Component Value Date     PROTTOTAL 7.4 03/27/2024     PROTTOTAL 7.6 07/30/2018            Lab Results   Component Value Date     ALKPHOS 102 03/27/2024     ALKPHOS 115 07/30/2018            Lab Results   Component Value Date     INR 0.84 02/20/2019        Imaging:      CT ABDOMEN PELVIS W  LOCATION: [facility]  DATE: 3/3/2024    INDICATION: Abdominal or pelvic pain  COMPARISON: None.  TECHNIQUE: CT scan of the abdomen and pelvis was performed following injection of IV contrast. Multiplanar reformats were obtained. Dose reduction techniques were used.  CONTRAST: " Omnipaque 350 87mL    FINDINGS:  LOWER CHEST: Normal.    HEPATOBILIARY: Moderate diffuse hepatic steatosis. Cholecystectomy. No biliary ductal dilatation.    PANCREAS: Normal.    SPLEEN: Normal.    ADRENAL GLANDS: Normal.    KIDNEYS/BLADDER: Symmetric enhancement of both kidneys. No hydronephrosis. Urinary bladder only minimally distended though otherwise unremarkable.    BOWEL: Small and large bowel loops are normal in caliber without obstruction. Appendix is normal in appearance. No free fluid or pneumoperitoneum.    LYMPH NODES: No abdominal or pelvic lymphadenopathy.    VASCULATURE: Abdominal aorta normal in caliber with mild mixed atheromatous plaque. Major portal veins are patent.    PELVIC ORGANS: Unremarkable.    MUSCULOSKELETAL: Tiny umbilical hernia containing fat and short segment of nonobstructed small bowel.        US ABDOMEN COMPLETE 2/14/2024      CLINICAL HISTORY: Patient having issue with vomiting in am, pain on  palpation to right upper quadrant and right and left lower quadrant,  history of IBS, had gallbalder and appendix removed; Nausea and  vomiting, unspecified vomiting type  TECHNIQUE: Complete abdominal ultrasound.  COMPARISON: None.     FINDINGS:  GALLBLADDER: Surgically absent.     BILE DUCTS: No biliary dilatation. The common duct measures 6 mm.  Portions of the common duct could not be visualized due to overlying  bowel gas.     LIVER: Increased echogenicity from diffuse fatty infiltration. No  focal hepatic lesions are identified.     RIGHT KIDNEY: Normal size. Normal echogenicity with no hydronephrosis  or mass.      LEFT KIDNEY: Normal size. Normal echogenicity with no hydronephrosis  or mass.      SPLEEN: Normal.     PANCREAS: Pancreas appears heterogenous.     AORTA: Normal caliber where seen.      IVC: Normal where visualized.     No ascites.                                                                    IMPRESSION:  1.  Heterogenous appearance of the pancreas that may suggest  pancreatitis. Recommend correlation with lipase levels and if further concern, suggest CT exam.  2.  Diffuse hepatic steatosis.     CELIA MCKEON MD              PM:    has no past medical history on file.     [facility]:    has a past surgical history that includes Laparoscopic appendectomy (2010); laparoscopic cholecystectomy (2010); arthroscopy knee rt/lt (Left, 2013); surgical history of -  (Left, 1995); arthroscopy knee rt/lt (Right, 10/2016); colonoscopy (02/2017); Colonoscopy with CO2 insufflation (N/A, 02/15/2017); Colonoscopy (N/A, 12/19/2023); hernia repair (2010); and Esophagoscopy, gastroscopy, duodenoscopy (EGD), combined (N/A, 8/28/2024).     Medications:   Current Outpatient Medications   Medication Sig Dispense Refill    hydrOXYzine HCl (ATARAX) 25 MG tablet TAKE 1 TABLET(25 MG) BY MOUTH THREE TIMES DAILY AS NEEDED FOR ITCHING OR NAUSEA OR VOMITING 60 tablet 1    metoclopramide (REGLAN) 5 MG tablet Take 1 tablet (5 mg) by mouth 3 times daily as needed (vomiting). 30 tablet 0    ondansetron (ZOFRAN ODT) 8 MG ODT tab Take 1 tablet (8 mg) by mouth every 8 hours as needed for nausea (Patient not taking: Reported on 9/20/2024) 20 tablet 1    oxyCODONE-acetaminophen (PERCOCET) 5-325 MG tablet Take 1 tablet by mouth daily.      traMADol (ULTRAM) 50 MG tablet Take 50 mg by mouth 2 times daily (Patient not taking: Reported on 9/20/2024)       No current facility-administered medications for this visit.     Previous work-up:   HCV antibody  HAV Ab IgG  HAV Ab IgM   HBV SAb  HBV SAg  HBV CAb  HIV   SHELDON   F-actin  AMA   Iron panel   Ferritin   Iron Sats:   TTG   Alpha-1-antitrypsin  Ceruloplasmin   TSH   Cholesterol Total   HDL  LDL  Triglycerides  Hemoglobin A1c    Recent Labs   Lab Test 04/17/24  1333 03/27/24  1204 02/12/24  1235 08/02/23  1436 03/15/23  1533 07/19/22  1422 03/18/22  1414 09/27/21  1212 01/18/21  0915 07/30/18  1037   ALKPHOS 104 102 76 72 94 79 83 89  --  115   ALT 51 72* 86* 46 53 90* 91* 52 31  41   AST 35 50* 44 41 23 58* 54* 31 23 31   BILITOTAL 0.4 0.4 0.5 0.3 0.3 0.5 0.2 0.3  --  0.4           Allergies:     Allergies   Allergen Reactions    Wellbutrin [Bupropion]           Social History:     Social History     Socioeconomic History    Marital status:      Spouse name: Not on file    Number of children: Not on file    Years of education: Not on file    Highest education level: Not on file   Occupational History    Not on file   Tobacco Use    Smoking status: Light Smoker     Current packs/day: 0.04     Types: Cigarettes    Smokeless tobacco: Never    Tobacco comments:     5-8 cigs per day   Vaping Use    Vaping status: Never Used   Substance and Sexual Activity    Alcohol use: Yes     Comment: 1-2 drinks per year; no hx of alcohol abuse    Drug use: No     Comment: THC gummies for sleep (use for ~2 mo); no hx IVDU    Sexual activity: Yes     Partners: Female   Other Topics Concern    Parent/sibling w/ CABG, MI or angioplasty before 65F 55M? Not Asked   Social History Narrative    Not on file     Social Determinants of Health     Financial Resource Strain: Low Risk  (11/17/2023)    Financial Resource Strain     Within the past 12 months, have you or your family members you live with been unable to get utilities (heat, electricity) when it was really needed?: No   Food Insecurity: Low Risk  (11/17/2023)    Food Insecurity     Within the past 12 months, did you worry that your food would run out before you got money to buy more?: No     Within the past 12 months, did the food you bought just not last and you didn t have money to get more?: No   Transportation Needs: Low Risk  (11/17/2023)    Transportation Needs     Within the past 12 months, has lack of transportation kept you from medical appointments, getting your medicines, non-medical meetings or appointments, work, or from getting things that you need?: No   Physical Activity: Not on file   Stress: Not on file   Social Connections: Unknown  (12/27/2021)    Received [facility], [facility]    Social Connections     Frequency of Communication with Friends and Family: Not on file   Interpersonal Safety: Low Risk  (8/28/2024)    Interpersonal Safety     Do you feel physically and emotionally safe where you currently live?: Yes     Within the past 12 months, have you been hit, slapped, kicked or otherwise physically hurt by someone?: No     Within the past 12 months, have you been humiliated or emotionally abused in other ways by your partner or ex-partner?: No   Housing Stability: Low Risk  (11/17/2023)    Housing Stability     Do you have housing? : Yes     Are you worried about losing your housing?: No          Family History:     Family History   Problem Relation Age of Onset    Prostate Cancer Maternal Uncle         X2    Liver Disease No family hx of     Liver Cancer No family hx of           Review of Systems:   Gen: See HPI          Physical Exam:   VS:    Gen: A&Ox3, NAD, well developed  HEENT: non-icteric ***, no cervical lymphadenopathy, no lesions or ulcers in oropharynx  CV: RRR, no overt murmurs  Lung: CTA Bilatererally, no wheezing or crackles.   Lym- no palpable lymphadenopathy  Abd: soft, NT, ND *** no palpable splenomegaly, liver is not palpable.   Ext: no edema, intact pulses.   Skin: No rash***, no palmar erythema, telangiectasias or jaundice  Neuro: grossly intact, no asterixis   Psych: appropriate mood and affects         Data:   Reviewed in person and significant for:    Lab Results   Component Value Date     04/17/2024     06/21/2021      Lab Results   Component Value Date    POTASSIUM 3.8 04/17/2024    POTASSIUM 3.8 06/21/2021     Lab Results   Component Value Date    CHLORIDE 105 04/17/2024    CHLORIDE 109 06/21/2021     Lab Results   Component Value Date    CO2 26 04/17/2024    CO2 26 06/21/2021     Lab Results   Component Value Date    BUN 7.1  "04/17/2024    BUN 11 06/21/2021     Lab Results   Component Value Date    CR 1.04 04/17/2024    CR 1.10 06/21/2021       Lab Results   Component Value Date    WBC 13.2 04/17/2024    WBC 13.7 08/07/2018     Lab Results   Component Value Date    HGB 17.8 04/17/2024    HGB 15.9 06/21/2021     Lab Results   Component Value Date    HCT 51.1 04/17/2024    HCT 47.9 08/07/2018     Lab Results   Component Value Date    MCV 78 04/17/2024    MCV 81 08/07/2018     Lab Results   Component Value Date     04/17/2024     08/07/2018     Lab Results   Component Value Date    AST 35 04/17/2024    AST 23 01/18/2021     Lab Results   Component Value Date    ALT 51 04/17/2024    ALT 31 01/18/2021     No results found for: \"BILICONJ\"   Lab Results   Component Value Date    BILITOTAL 0.4 04/17/2024    BILITOTAL 0.4 07/30/2018     Lab Results   Component Value Date    ALBUMIN 4.8 04/17/2024    ALBUMIN 3.8 03/15/2023    ALBUMIN 3.8 07/30/2018     Lab Results   Component Value Date    PROTTOTAL 7.9 04/17/2024    PROTTOTAL 7.6 07/30/2018      Lab Results   Component Value Date    ALKPHOS 104 04/17/2024    ALKPHOS 115 07/30/2018     Lab Results   Component Value Date    INR 1.00 04/17/2024    INR 0.84 02/20/2019       Imaging:      CT ABDOMEN PELVIS W  LOCATION: [facility]  DATE: 3/3/2024    INDICATION: Abdominal or pelvic pain  COMPARISON: None.  TECHNIQUE: CT scan of the abdomen and pelvis was performed following injection of IV contrast. Multiplanar reformats were obtained. Dose reduction techniques were used.  CONTRAST: Omnipaque 350 87mL    FINDINGS:  LOWER CHEST: Normal.    HEPATOBILIARY: Moderate diffuse hepatic steatosis. Cholecystectomy. No biliary ductal dilatation.    PANCREAS: Normal.    SPLEEN: Normal.    ADRENAL GLANDS: Normal.    KIDNEYS/BLADDER: Symmetric enhancement of both kidneys. No hydronephrosis. Urinary bladder only minimally distended though otherwise unremarkable.    BOWEL: Small and large bowel loops " are normal in caliber without obstruction. Appendix is normal in appearance. No free fluid or pneumoperitoneum.    LYMPH NODES: No abdominal or pelvic lymphadenopathy.    VASCULATURE: Abdominal aorta normal in caliber with mild mixed atheromatous plaque. Major portal veins are patent.    PELVIC ORGANS: Unremarkable.    MUSCULOSKELETAL: Tiny umbilical hernia containing fat and short segment of nonobstructed small bowel.           US ABDOMEN COMPLETE 2/14/2024      CLINICAL HISTORY: Patient having issue with vomiting in am, pain on  palpation to right upper quadrant and right and left lower quadrant,  history of IBS, had gallbalder and appendix removed; Nausea and  vomiting, unspecified vomiting type  TECHNIQUE: Complete abdominal ultrasound.  COMPARISON: None.     FINDINGS:  GALLBLADDER: Surgically absent.     BILE DUCTS: No biliary dilatation. The common duct measures 6 mm.  Portions of the common duct could not be visualized due to overlying  bowel gas.     LIVER: Increased echogenicity from diffuse fatty infiltration. No  focal hepatic lesions are identified.     RIGHT KIDNEY: Normal size. Normal echogenicity with no hydronephrosis  or mass.      LEFT KIDNEY: Normal size. Normal echogenicity with no hydronephrosis  or mass.      SPLEEN: Normal.     PANCREAS: Pancreas appears heterogenous.     AORTA: Normal caliber where seen.      IVC: Normal where visualized.     No ascites.                                                                    IMPRESSION:  1.  Heterogenous appearance of the pancreas that may suggest pancreatitis. Recommend correlation with lipase levels and if further concern, suggest CT exam.  2.  Diffuse hepatic steatosis.     CELIA MCKEON MD       Hepatology Clinic Note  Pop Eaton III   Date of Birth 1965    REASON FOR FOLLOW UP: fatty liver, abnormal LFTs         Assessment/plan:     Pop Eaton III is a 60 YO M with PMHx significant for biliary colic, MDD, melanoma,  depression, cervical spinal stenosis, RLS, PTSD, hx of colonic polyps and tobacco use disorder who presents for follow up regarding concerns of abnormal LFTs and imaging which has noted hepatic steatosis.     No known underlying liver disease/condition. Has never had a liver biopsy.  Denies known family hx of liver disease or liver cancer. Very rare alcohol consumption.     Waxing/waning, mildly elevated LFTs (ALT>AST), Likely 2/2 MAFLD vs other   Hepatic steatosis (S3), likely metabolic associated and not alcohol related   Vomiting, left-sided abdominal pain              > Discussed with pt L sided abd pain is very unlikely to be 2/2 liver etiology   Hx IBS  Recent unintentional weight loss     US abd complete 2/14/24: Increased echogenicity from diffuse fatty infiltration. No focal hepatic lesions. Gallbladder surgically absent. No biliary dilatation. CBD 6 mm. Portions of CBD could not be visualized due to overlying bowel gas.     CT abd/pelvis w contrast 3/3/24: Moderate diffuse hepatic steatosis. Cholecystectomy. No biliary ductal dilatation. Spleen normal. Pancreas normal.      FIB-4: 1.46: AST: 50, ALT: 72, PLT: 230, Age: 58)     Fibroscan 4/17/24: S3 steatosis and F0/F1 fibrosis     Risk factors for metabolic fatty liver disease: Prediabetes, BMI 29, limited routine physical exercise as of late, mixed dyslipidemia     PLAN:  - Reviewed recent labs and imaging  - Pending additional exclusionary liver labs   - Completed and discussed fibroscan results  - Discussed pathophysiology and natural hx of nonalcoholic fatty liver disease   - Recommend slow gradual weight loss. Discussed how a weight loss of 3-5% can show improvement on steatosis  - Maintain good control of cholesterol (No contraindication to starting a statin with LFT elevations)   - Optimize blood glucose as needed. Could consider GLP-1 inhibitor for both insulin resistance and help with weight loss  - Limit carbs, especially simple carbs, and  following Mediterranean eating patten  - Increase physical activity as able  - Continue with rare alcohol use in moderation  - Continue to follow with PCP and GI regarding abdominal pain, nausea, vomiting and irregular bowel issues   - Follow up with me in ~6 months with labs same day    Zoya Coon PA-C  [facility]   -----------------------------------------------------         HPI:     Patient presents today for consult regarding concerns of fatty liver and elevated LFTs.  States all he had today to eat or drink less coffee with cream this morning.  Since December 2023, has been dealing with left-sided abdominal pain that is constant and throbbing.  Patient states since December he has also been dealing with nausea and vomiting.  Over the past 2 months has been vomiting typically daily in the morning.  No history of vomiting blood.  No recent fevers  or chills.  No yellowing of eyes or skin.  Is no longer using Zofran.  States he is taking hydroxyzine for nausea daily which does seem to be effective.  Is no longer on oxycodone either.  Has never been diagnosed with pancreatitis.  Has lost about 35 pounds unintentionally over the past 3-4 months.  Has been trying to do most of his cooking at home as of late.  Has not been doing any routine physical exercise due to pain.  Has never been told he is diabetic.  Has never been on cholesterol-lowering medications.  Was initially told about fatty liver about 3 months ago.  Has never been told in the past that he has abnormal liver enzymes.       States he establish with a new PCP 2-3 months and is much happier with his ongoing care.     Currently smokes 5-8 cigarettes per day.  No hx of IV drug use.  Occasionally uses THC Gummies for sleep and has done this for the past 2 mo or so.  No hx of alcohol abuse. Drinks 1-2 alcoholic beverages per year.     Abdominal surgical history has included appendectomy, cholecystectomy and hernia repair.      Previous work-up:   HCV antibody: nonreactive   HIV: nonreactive    TTG: negative   IgA: unremarkable   Cholesterol: 129  HDL: 27  LDL: 60  Triglycerides: 208  Hemoglobin A1c: 5.8     HAV Ab total:    HBV Sab:  HBV Sag:   HBV CAb  Alpha-1-antitrypsin  Ceruloplasmin   TSH   SHELDON   F-actin  AMA   Iron panel   Ferritin   Iron Sats:      PMH:    has no past medical history on file.      [facility]:    has a past surgical history that includes Laparoscopic appendectomy (2010); laparoscopic cholecystectomy (2010); arthroscopy knee rt/lt (Left, 2013); surgical history of -  (Left, 1995); arthroscopy knee rt/lt (Right, 10/2016); colonoscopy (02/2017); Colonoscopy with CO2 insufflation (N/A, 02/15/2017); Colonoscopy (N/A, 12/19/2023); and hernia repair (2010).      Medications:   Current Facility-Administered Medications          Current Outpatient Medications   Medication Sig Dispense Refill     hydrOXYzine HCl (ATARAX) 25 MG tablet Take 1 tablet (25 mg) by mouth 3 times daily as needed for other or itching (nausea and vomiting) 60 tablet 1     ondansetron (ZOFRAN ODT) 8 MG ODT tab Take 1 tablet (8 mg) by mouth every 8 hours as needed for nausea 20 tablet 1     oxyCODONE-acetaminophen (PERCOCET) 5-325 MG tablet Take 1 tablet by mouth daily                    Current Facility-Administered Medications   Medication Dose Route Frequency Provider Last Rate Last Admin     triamcinolone (KENALOG-40) injection 40 mg  40 mg Intra-Lesional Once Ayush Santos MD                         Recent Labs   Lab Test 03/27/24  1204 02/12/24  1235 08/02/23  1436 03/15/23  1533 07/19/22  1422 03/18/22  1414 09/27/21  1212 01/18/21  0915 07/30/18  1037   ALKPHOS 102 76 72 94 79 83 89  --  115   ALT 72* 86* 46 53 90* 91* 52 31 41   AST 50* 44 41 23 58* 54* 31 23 31   BILITOTAL 0.4 0.5 0.3 0.3 0.5 0.2 0.3  --  0.4           Allergies:      Allergies        Allergies   Allergen Reactions     Wellbutrin [Bupropion]                Social History:       Social History   Social History            Socioeconomic History     Marital status:        Spouse name: Not on file     Number of children: Not on file     Years of education: Not on file     Highest education level: Not on file   Occupational History     Not on file   Tobacco Use     Smoking status: Light Smoker       Current packs/day: 0.04       Types: Cigarettes     Smokeless tobacco: Never     Tobacco comments:       5-8 cigs per day   Vaping Use     Vaping status: Never Used   Substance and Sexual Activity     Alcohol use: Yes       Comment: 1-2 drinks per year; no hx of alcohol abuse     Drug use: No       Comment: THC gummies for sleep (use for ~2 mo); no hx IVDU     Sexual activity: Yes       Partners: Female   Other Topics Concern     Parent/sibling w/ CABG, MI or angioplasty before 65F 55M? Not Asked   Social History Narrative     Not on file      Social Determinants of Health           Financial Resource Strain: Low Risk  (11/17/2023)     Financial Resource Strain       Within the past 12 months, have you or your family members you live with been unable to get utilities (heat, electricity) when it was really needed?: No   Food Insecurity: Low Risk  (11/17/2023)     Food Insecurity       Within the past 12 months, did you worry that your food would run out before you got money to buy more?: No       Within the past 12 months, did the food you bought just not last and you didn t have money to get more?: No   Transportation Needs: Low Risk  (11/17/2023)     Transportation Needs       Within the past 12 months, has lack of transportation kept you from medical appointments, getting your medicines, non-medical meetings or appointments, work, or from getting things that you need?: No   Physical Activity: Not on file   Stress: Not on file   Social Connections: Unknown (12/27/2021)     Received from [facility], [facility]     Social  "Connections       Frequency of Communication with Friends and Family: Not on file   Interpersonal Safety: Not on file   Housing Stability: Low Risk  (11/17/2023)     Housing Stability       Do you have housing? : Yes       Are you worried about losing your housing?: No              Family History:      Family History         Family History   Problem Relation Age of Onset     Prostate Cancer Maternal Uncle           X2     Liver Disease No family hx of       Liver Cancer No family hx of                Review of Systems:   Gen: See HPI           Physical Exam:   Vital signs:  Temp: 97.8  F (36.6  C) Temp src: Oral BP: 129/87 Pulse: 78   Resp: 18 SpO2: 96 %     Height: 172.7 cm (5' 7.99\") Weight: 88.5 kg (195 lb 1.6 oz)  Estimated body mass index is 29.67 kg/m  as calculated from the following:    Height as of this encounter: 1.727 m (5' 7.99\").    Weight as of this encounter: 88.5 kg (195 lb 1.6 oz).  Gen: A&Ox3, NAD  HEENT: Sclera anicteric   CV: RRR, no overt murmurs  Lung: CTA, no wheezing or crackles.   Abd: soft, TTP in LUQ and epigastric region, ND, no palpable splenomegaly, liver is not palpable.   Ext: no edema, intact pulses.   Skin: No jaundice  Neuro: grossly intact, no asterixis   Psych: appropriate mood and affects          Data:   Reviewed in person and significant for:           Lab Results   Component Value Date      02/12/2024      06/21/2021            Lab Results   Component Value Date     POTASSIUM 4.4 02/12/2024     POTASSIUM 3.8 06/21/2021            Lab Results   Component Value Date     CHLORIDE 103 02/12/2024     CHLORIDE 109 06/21/2021            Lab Results   Component Value Date     CO2 25 02/12/2024     CO2 26 06/21/2021            Lab Results   Component Value Date     BUN 7.9 02/12/2024     BUN 11 06/21/2021            Lab Results   Component Value Date     CR 1.07 02/12/2024     CR 1.10 06/21/2021               Lab Results   Component Value Date     WBC 13.9 03/27/2024     " "WBC 13.7 08/07/2018            Lab Results   Component Value Date     HGB 16.7 03/27/2024     HGB 15.9 06/21/2021            Lab Results   Component Value Date     HCT 48.1 03/27/2024     HCT 47.9 08/07/2018            Lab Results   Component Value Date     MCV 79 03/27/2024     MCV 81 08/07/2018            Lab Results   Component Value Date      03/27/2024      08/07/2018               Lab Results   Component Value Date     AST 50 03/27/2024     AST 23 01/18/2021            Lab Results   Component Value Date     ALT 72 03/27/2024     ALT 31 01/18/2021      No results found for: \"BILICONJ\"         Lab Results   Component Value Date     BILITOTAL 0.4 03/27/2024     BILITOTAL 0.4 07/30/2018               Lab Results   Component Value Date     ALBUMIN 4.6 03/27/2024     ALBUMIN 3.8 03/15/2023     ALBUMIN 3.8 07/30/2018            Lab Results   Component Value Date     PROTTOTAL 7.4 03/27/2024     PROTTOTAL 7.6 07/30/2018            Lab Results   Component Value Date     ALKPHOS 102 03/27/2024     ALKPHOS 115 07/30/2018            Lab Results   Component Value Date     INR 0.84 02/20/2019        Imaging:      CT ABDOMEN PELVIS W  LOCATION: [facility]  DATE: 3/3/2024    INDICATION: Abdominal or pelvic pain  COMPARISON: None.  TECHNIQUE: CT scan of the abdomen and pelvis was performed following injection of IV contrast. Multiplanar reformats were obtained. Dose reduction techniques were used.  CONTRAST: Omnipaque 350 87mL    FINDINGS:  LOWER CHEST: Normal.    HEPATOBILIARY: Moderate diffuse hepatic steatosis. Cholecystectomy. No biliary ductal dilatation.    PANCREAS: Normal.    SPLEEN: Normal.    ADRENAL GLANDS: Normal.    KIDNEYS/BLADDER: Symmetric enhancement of both kidneys. No hydronephrosis. Urinary bladder only minimally distended though otherwise unremarkable.    BOWEL: Small and large bowel loops are normal in caliber without obstruction. Appendix is normal in appearance. No free fluid or " pneumoperitoneum.    LYMPH NODES: No abdominal or pelvic lymphadenopathy.    VASCULATURE: Abdominal aorta normal in caliber with mild mixed atheromatous plaque. Major portal veins are patent.    PELVIC ORGANS: Unremarkable.    MUSCULOSKELETAL: Tiny umbilical hernia containing fat and short segment of nonobstructed small bowel.        US ABDOMEN COMPLETE 2/14/2024      CLINICAL HISTORY: Patient having issue with vomiting in am, pain on  palpation to right upper quadrant and right and left lower quadrant,  history of IBS, had gallbalder and appendix removed; Nausea and  vomiting, unspecified vomiting type  TECHNIQUE: Complete abdominal ultrasound.  COMPARISON: None.     FINDINGS:  GALLBLADDER: Surgically absent.     BILE DUCTS: No biliary dilatation. The common duct measures 6 mm.  Portions of the common duct could not be visualized due to overlying  bowel gas.     LIVER: Increased echogenicity from diffuse fatty infiltration. No  focal hepatic lesions are identified.     RIGHT KIDNEY: Normal size. Normal echogenicity with no hydronephrosis  or mass.      LEFT KIDNEY: Normal size. Normal echogenicity with no hydronephrosis  or mass.      SPLEEN: Normal.     PANCREAS: Pancreas appears heterogenous.     AORTA: Normal caliber where seen.      IVC: Normal where visualized.     No ascites.                                                                    IMPRESSION:  1.  Heterogenous appearance of the pancreas that may suggest pancreatitis. Recommend correlation with lipase levels and if further concern, suggest CT exam.  2.  Diffuse hepatic steatosis.     CELIA MCKEON MD              [facility]:    has no past medical history on file.     [facility]:    has a past surgical history that includes Laparoscopic appendectomy (2010); laparoscopic cholecystectomy (2010); arthroscopy knee rt/lt (Left, 2013); surgical history of -  (Left, 1995); arthroscopy knee rt/lt (Right, 10/2016); colonoscopy (02/2017); Colonoscopy with CO2 insufflation  (N/A, 02/15/2017); Colonoscopy (N/A, 12/19/2023); hernia repair (2010); and Esophagoscopy, gastroscopy, duodenoscopy (EGD), combined (N/A, 8/28/2024).     Medications:   Current Outpatient Medications   Medication Sig Dispense Refill     hydrOXYzine HCl (ATARAX) 25 MG tablet TAKE 1 TABLET(25 MG) BY MOUTH THREE TIMES DAILY AS NEEDED FOR ITCHING OR NAUSEA OR VOMITING 60 tablet 1     metoclopramide (REGLAN) 5 MG tablet Take 1 tablet (5 mg) by mouth 3 times daily as needed (vomiting). 30 tablet 0     ondansetron (ZOFRAN ODT) 8 MG ODT tab Take 1 tablet (8 mg) by mouth every 8 hours as needed for nausea (Patient not taking: Reported on 9/20/2024) 20 tablet 1     oxyCODONE-acetaminophen (PERCOCET) 5-325 MG tablet Take 1 tablet by mouth daily.       traMADol (ULTRAM) 50 MG tablet Take 50 mg by mouth 2 times daily (Patient not taking: Reported on 9/20/2024)       No current facility-administered medications for this visit.     Previous work-up:   HCV antibody  HAV Ab IgG  HAV Ab IgM   HBV SAb  HBV SAg  HBV CAb  HIV   SHELDON   F-actin  AMA   Iron panel   Ferritin   Iron Sats:   TTG   Alpha-1-antitrypsin  Ceruloplasmin   TSH   Cholesterol Total   HDL  LDL  Triglycerides  Hemoglobin A1c    Recent Labs   Lab Test 04/17/24  1333 03/27/24  1204 02/12/24  1235 08/02/23  1436 03/15/23  1533 07/19/22  1422 03/18/22  1414 09/27/21  1212 01/18/21  0915 07/30/18  1037   ALKPHOS 104 102 76 72 94 79 83 89  --  115   ALT 51 72* 86* 46 53 90* 91* 52 31 41   AST 35 50* 44 41 23 58* 54* 31 23 31   BILITOTAL 0.4 0.4 0.5 0.3 0.3 0.5 0.2 0.3  --  0.4           Allergies:     Allergies   Allergen Reactions     Wellbutrin [Bupropion]           Social History:     Social History     Socioeconomic History     Marital status:      Spouse name: Not on file     Number of children: Not on file     Years of education: Not on file     Highest education level: Not on file   Occupational History     Not on file   Tobacco Use     Smoking status: Light  Smoker     Current packs/day: 0.04     Types: Cigarettes     Smokeless tobacco: Never     Tobacco comments:     5-8 cigs per day   Vaping Use     Vaping status: Never Used   Substance and Sexual Activity     Alcohol use: Yes     Comment: 1-2 drinks per year; no hx of alcohol abuse     Drug use: No     Comment: THC gummies for sleep (use for ~2 mo); no hx IVDU     Sexual activity: Yes     Partners: Female   Other Topics Concern     Parent/sibling w/ CABG, MI or angioplasty before 65F 55M? Not Asked   Social History Narrative     Not on file     Social Determinants of Health     Financial Resource Strain: Low Risk  (11/17/2023)    Financial Resource Strain      Within the past 12 months, have you or your family members you live with been unable to get utilities (heat, electricity) when it was really needed?: No   Food Insecurity: Low Risk  (11/17/2023)    Food Insecurity      Within the past 12 months, did you worry that your food would run out before you got money to buy more?: No      Within the past 12 months, did the food you bought just not last and you didn t have money to get more?: No   Transportation Needs: Low Risk  (11/17/2023)    Transportation Needs      Within the past 12 months, has lack of transportation kept you from medical appointments, getting your medicines, non-medical meetings or appointments, work, or from getting things that you need?: No   Physical Activity: Not on file   Stress: Not on file   Social Connections: Unknown (12/27/2021)    [facility]    Social Connections      Frequency of Communication with Friends and Family: Not on file   Interpersonal Safety: Low Risk  (8/28/2024)    Interpersonal Safety      Do you feel physically and emotionally safe where you currently live?: Yes      Within the past 12 months, have you been hit, slapped, kicked or otherwise physically hurt by someone?: No       Within the past 12 months, have you been humiliated or emotionally abused in other ways by your partner or ex-partner?: No   Housing Stability: Low Risk  (11/17/2023)    Housing Stability      Do you have housing? : Yes      Are you worried about losing your housing?: No          Family History:     Family History   Problem Relation Age of Onset     Prostate Cancer Maternal Uncle         X2     Liver Disease No family hx of      Liver Cancer No family hx of           Review of Systems:   Gen: See HPI          Physical Exam:   VS:    Gen: A&Ox3, NAD, well developed  HEENT: non-icteric ***, no cervical lymphadenopathy, no lesions or ulcers in oropharynx  CV: RRR, no overt murmurs  Lung: CTA Bilatererally, no wheezing or crackles.   Lym- no palpable lymphadenopathy  Abd: soft, NT, ND *** no palpable splenomegaly, liver is not palpable.   Ext: no edema, intact pulses.   Skin: No rash***, no palmar erythema, telangiectasias or jaundice  Neuro: grossly intact, no asterixis   Psych: appropriate mood and affects         Data:   Reviewed in person and significant for:    Lab Results   Component Value Date     04/17/2024     06/21/2021      Lab Results   Component Value Date    POTASSIUM 3.8 04/17/2024    POTASSIUM 3.8 06/21/2021     Lab Results   Component Value Date    CHLORIDE 105 04/17/2024    CHLORIDE 109 06/21/2021     Lab Results   Component Value Date    CO2 26 04/17/2024    CO2 26 06/21/2021     Lab Results   Component Value Date    BUN 7.1 04/17/2024    BUN 11 06/21/2021     Lab Results   Component Value Date    CR 1.04 04/17/2024    CR 1.10 06/21/2021       Lab Results   Component Value Date    WBC 13.2 04/17/2024    WBC 13.7 08/07/2018     Lab Results   Component Value Date    HGB 17.8 04/17/2024    HGB 15.9 06/21/2021     Lab Results   Component Value Date    HCT 51.1 04/17/2024    HCT 47.9 08/07/2018     Lab Results   Component Value Date    MCV 78 04/17/2024    MCV 81 08/07/2018     Lab Results  "  Component Value Date     04/17/2024     08/07/2018     Lab Results   Component Value Date    AST 35 04/17/2024    AST 23 01/18/2021     Lab Results   Component Value Date    ALT 51 04/17/2024    ALT 31 01/18/2021     No results found for: \"BILICONJ\"   Lab Results   Component Value Date    BILITOTAL 0.4 04/17/2024    BILITOTAL 0.4 07/30/2018     Lab Results   Component Value Date    ALBUMIN 4.8 04/17/2024    ALBUMIN 3.8 03/15/2023    ALBUMIN 3.8 07/30/2018     Lab Results   Component Value Date    PROTTOTAL 7.9 04/17/2024    PROTTOTAL 7.6 07/30/2018      Lab Results   Component Value Date    ALKPHOS 104 04/17/2024    ALKPHOS 115 07/30/2018     Lab Results   Component Value Date    INR 1.00 04/17/2024    INR 0.84 02/20/2019       Imaging:      CT ABDOMEN PELVIS W  LOCATION: [facility]  DATE: 3/3/2024    INDICATION: Abdominal or pelvic pain  COMPARISON: None.  TECHNIQUE: CT scan of the abdomen and pelvis was performed following injection of IV contrast. Multiplanar reformats were obtained. Dose reduction techniques were used.  CONTRAST: Omnipaque 350 87mL    FINDINGS:  LOWER CHEST: Normal.    HEPATOBILIARY: Moderate diffuse hepatic steatosis. Cholecystectomy. No biliary ductal dilatation.    PANCREAS: Normal.    SPLEEN: Normal.    ADRENAL GLANDS: Normal.    KIDNEYS/BLADDER: Symmetric enhancement of both kidneys. No hydronephrosis. Urinary bladder only minimally distended though otherwise unremarkable.    BOWEL: Small and large bowel loops are normal in caliber without obstruction. Appendix is normal in appearance. No free fluid or pneumoperitoneum.    LYMPH NODES: No abdominal or pelvic lymphadenopathy.    VASCULATURE: Abdominal aorta normal in caliber with mild mixed atheromatous plaque. Major portal veins are patent.    PELVIC ORGANS: Unremarkable.    MUSCULOSKELETAL: Tiny umbilical hernia containing fat and short segment of nonobstructed small bowel.           US ABDOMEN COMPLETE 2/14/2024    "   CLINICAL HISTORY: Patient having issue with vomiting in am, pain on  palpation to right upper quadrant and right and left lower quadrant,  history of IBS, had gallbalder and appendix removed; Nausea and  vomiting, unspecified vomiting type  TECHNIQUE: Complete abdominal ultrasound.  COMPARISON: None.     FINDINGS:  GALLBLADDER: Surgically absent.     BILE DUCTS: No biliary dilatation. The common duct measures 6 mm.  Portions of the common duct could not be visualized due to overlying  bowel gas.     LIVER: Increased echogenicity from diffuse fatty infiltration. No  focal hepatic lesions are identified.     RIGHT KIDNEY: Normal size. Normal echogenicity with no hydronephrosis  or mass.      LEFT KIDNEY: Normal size. Normal echogenicity with no hydronephrosis  or mass.      SPLEEN: Normal.     PANCREAS: Pancreas appears heterogenous.     AORTA: Normal caliber where seen.      IVC: Normal where visualized.     No ascites.                                                                    IMPRESSION:  1.  Heterogenous appearance of the pancreas that may suggest pancreatitis. Recommend correlation with lipase levels and if further concern, suggest CT exam.  2.  Diffuse hepatic steatosis.     CELIA MCKEON MD         Again, thank you for allowing me to participate in the care of your patient.        Sincerely,        Zoya Coon PA-C

## 2024-10-08 NOTE — NURSING NOTE
"Chief Complaint   Patient presents with    RECHECK     Hepatic steatosis      Vital signs:  Temp: 98  F (36.7  C) Temp src: Oral BP: (!) 161/90 (provider notified) Pulse: 76   Resp: 18 SpO2: 95 %     Height: 172.7 cm (5' 7.99\") Weight: 86.5 kg (190 lb 12.8 oz)  Estimated body mass index is 29.02 kg/m  as calculated from the following:    Height as of this encounter: 1.727 m (5' 7.99\").    Weight as of this encounter: 86.5 kg (190 lb 12.8 oz).      Sue Phipps [facility]  10/8/2024 10:27 AM    "

## 2024-10-08 NOTE — PROGRESS NOTES
Hepatology Clinic Note  Pop Eaton III   Date of Birth 1965    REASON FOR FOLLOW UP: fatty liver, abnormal LFTs         Assessment/plan:     Pop Eaton III is a 60 YO M with PMHx significant for biliary colic, MDD, melanoma, depression, cervical spinal stenosis, RLS, PTSD, hx of colonic polyps, prediabetes and tobacco use disorder who presents for follow up regarding hx of abnormal LFTs and hepatic steatosis.     No known underlying liver disease/condition. Has never had a liver biopsy. Denies known family hx of liver disease or liver cancer. Very rare alcohol consumption.     Waxing/waning, mildly elevated LFTs (ALT>AST), Likely 2/2 steatohepatitis in the setting of MAFLD vs other   Borderline + SHELDON (negative IgG and Factin)  Hepatic steatosis (S3), likely metabolic associated and not alcohol related      US abd complete 2/14/24: Increased echogenicity from diffuse fatty infiltration. No focal hepatic lesions. Gallbladder surgically absent. No biliary dilatation. CBD 6 mm. Portions of CBD could not be visualized due to overlying bowel gas.     CT abd/pelvis w contrast 3/3/24: Moderate diffuse hepatic steatosis. Cholecystectomy. No biliary ductal dilatation. Spleen normal. Pancreas normal.      FIB-4: 1.46: AST: 50, ALT: 72, PLT: 230, Age: 58     Fibroscan 4/17/24: S3 steatosis and F0/F1 fibrosis     Risk factors for metabolic fatty liver disease: Prediabetes, BMI 29, limited routine physical exercise, mixed dyslipidemia    Chronic vomiting, nausea, abdominal pain/bloating              > Discussed with pt that chronic abd pain/bloating is very unlikely to be 2/2 liver etiology   Hx IBS  Unintentional weight loss   > Follows with General GI and was recently started on metoclopramide    > S/p EGD Aug 2024 and colonoscopy Dec 2023     PLAN:  - Reviewed recent labs   - Placed referral to Hematology in setting of continued abnormal CBC results   - Recommended Hepatitis A vaccine with PCP/local  pharmacist   - Re-discussed pathophysiology and natural hx of nonalcoholic fatty liver disease   - Continue to work on slow, gradual weight loss  - Maintain good control of cholesterol (No contraindication to starting a statin with recent LFT elevations)   - Optimize blood glucose PRN. Could consider GLP-1 inhibitor for both insulin resistance and help with weight loss  - Limit carbs, especially simple carbs, and follow Mediterranean eating patten  - Increase physical activity as able  - Continue with rare alcohol use in moderation  - Highly encouraged patient work on decreasing coffee intake   - Continue to follow with PCP and GI regarding abdominal pain, nausea, vomiting and irregular bowel issues   - Follow up with me in ~6 months with labs same day    Zoya Coon PA-C  [facility]   -----------------------------------------------------         HPI:     Patient presents today to clinic for follow-up.  Had lab work today but was not fasting.    Patient tells me typically eats 1 meal per day.  Patient states he typically does not eat more that due to the GI symptoms he continues to experience.  Symptoms include nausea, vomiting, abdominal pain/bloating and abnormal bowel movements.  Patient typically states he has 2-3 loose/watery bowel movements per day.  No bright red blood per rectum or melena.  Denies excess gas.  Patient states abdominal pain is typically upper and is steady.  Abdominal pain resolves daily around 1 or 2 PM.  Pain feels crampy and sharp.  Typically vomits daily in the morning.  No blood in emesis.  Also experiences nausea in the morning.  Patient states he takes Percocet in the morning which seems to help reduce abdominal pain.  You was recently prescribed metoclopramide by GI but has not yet started taking.  Patient states he has increased intake of salads and has decreased consumption of red meat.  He admits to drinking 1 gallon of milk per day.  Also consumes 2  pots of coffee per day.  Does not drink water.  No recent yellowing of eyes or skin.    Currently smokes 5-8 cigarettes per day.  No hx of IV drug use.  Occasionally uses THC Gummies. No hx of alcohol abuse. Drinks 1-2 alcoholic beverages per year.     Abdominal surgical history has included appendectomy, cholecystectomy and hernia repair.     Previous work-up:   HCV antibody: nonreactive   HAV Ab total:  nonreactive  HBV Sab: reactive, 19.5  HBV Sag: nonreactive  HBV Cab: nonreactive  HIV: nonreactive    TTG: negative   IgA: unremarkable   Cholesterol: 129  HDL: 27  LDL: 60  Triglycerides: 208  Hemoglobin A1c: 5.8  Alpha-1-antitrypsin: 95; M2Z            > M allele protein product is a normal variant. The Z allele protein product variant is a   deficiency variant (~15 percent of normal serum concentrations). Smokers with this phenotype have an increased rate of loss of lung elasticity but rarely develop clinical disease.     Ceruloplasmin: 31   TSH: 1.91  SHELDON: borderline +  F-actin: negative  AMA: negative  Iron: 103  Ferritin: 170  Iron Sats: 32%     PMH:    has no past medical history on file.      SMH:    has a past surgical history that includes Laparoscopic appendectomy (2010); laparoscopic cholecystectomy (2010); arthroscopy knee rt/lt (Left, 2013); surgical history of -  (Left, 1995); arthroscopy knee rt/lt (Right, 10/2016); colonoscopy (02/2017); Colonoscopy with CO2 insufflation (N/A, 02/15/2017); Colonoscopy (N/A, 12/19/2023); and hernia repair (2010).      Medications:   Current Facility-Administered Medications          Current Outpatient Medications   Medication Sig Dispense Refill    hydrOXYzine HCl (ATARAX) 25 MG tablet Take 1 tablet (25 mg) by mouth 3 times daily as needed for other or itching (nausea and vomiting) 60 tablet 1    ondansetron (ZOFRAN ODT) 8 MG ODT tab Take 1 tablet (8 mg) by mouth every 8 hours as needed for nausea 20 tablet 1    oxyCODONE-acetaminophen (PERCOCET) 5-325 MG tablet  Take 1 tablet by mouth daily                    Current Facility-Administered Medications   Medication Dose Route Frequency Provider Last Rate Last Admin    triamcinolone (KENALOG-40) injection 40 mg  40 mg Intra-Lesional Once Ayush Santos MD                         Recent Labs   Lab Test 03/27/24  1204 02/12/24  1235 08/02/23  1436 03/15/23  1533 07/19/22  1422 03/18/22  1414 09/27/21  1212 01/18/21  0915 07/30/18  1037   ALKPHOS 102 76 72 94 79 83 89  --  115   ALT 72* 86* 46 53 90* 91* 52 31 41   AST 50* 44 41 23 58* 54* 31 23 31   BILITOTAL 0.4 0.5 0.3 0.3 0.5 0.2 0.3  --  0.4           Allergies:      Allergies        Allergies   Allergen Reactions    Wellbutrin [Bupropion]                Social History:              Socioeconomic History    Marital status:        Spouse name: Not on file    Number of children: Not on file    Years of education: Not on file    Highest education level: Not on file   Occupational History    Not on file   Tobacco Use    Smoking status: Light Smoker       Current packs/day: 0.04       Types: Cigarettes    Smokeless tobacco: Never    Tobacco comments:       5-8 cigs per day   Vaping Use    Vaping status: Never Used   Substance and Sexual Activity    Alcohol use: Yes       Comment: 1-2 drinks per year; no hx of alcohol abuse    Drug use: No       Comment: THC gummies for sleep (use for ~2 mo); no hx IVDU    Sexual activity: Yes       Partners: Female   Other Topics Concern    Parent/sibling w/ CABG, MI or angioplasty before 65F 55M? Not Asked   Social History Narrative    Not on file      Social Determinants of Health           Financial Resource Strain: Low Risk  (11/17/2023)     Financial Resource Strain      Within the past 12 months, have you or your family members you live with been unable to get utilities (heat, electricity) when it was really needed?: No   Food Insecurity: Low Risk  (11/17/2023)     Food Insecurity      Within the past 12 months, did you worry  "that your food would run out before you got money to buy more?: No      Within the past 12 months, did the food you bought just not last and you didn t have money to get more?: No   Transportation Needs: Low Risk  (11/17/2023)     Transportation Needs      Within the past 12 months, has lack of transportation kept you from medical appointments, getting your medicines, non-medical meetings or appointments, work, or from getting things that you need?: No   Physical Activity: Not on file   Stress: Not on file   Social Connections: Unknown (12/27/2021)     Received from [employer]     Social Connections      Frequency of Communication with Friends and Family: Not on file   Interpersonal Safety: Not on file   Housing Stability: Low Risk  (11/17/2023)     Housing Stability      Do you have housing? : Yes      Are you worried about losing your housing?: No              Family History:      Family History         Family History   Problem Relation Age of Onset    Prostate Cancer Maternal Uncle           X2    Liver Disease No family hx of      Liver Cancer No family hx of                Review of Systems:   Gen: See HPI           Physical Exam:   Vital signs:  Temp: 97.8  F (36.6  C) Temp src: Oral BP: 129/87 Pulse: 78   Resp: 18 SpO2: 96 %     Height: 172.7 cm (5' 7.99\") Weight: 88.5 kg (195 lb 1.6 oz)  Estimated body mass index is 29.67 kg/m  as calculated from the following:    Height as of this encounter: 1.727 m (5' 7.99\").    Weight as of this encounter: 88.5 kg (195 lb 1.6 oz).    Gen: A&Ox3, NAD  HEENT: Sclera anicteric   Lung: non-labored breathing   Ext: no edema  Skin: No jaundice  Neuro: grossly intact  Psych: appropriate mood and affects          Data:   Reviewed in person and significant for:           Lab Results   Component Value Date      02/12/2024      06/21/2021            Lab Results   Component Value Date     " "POTASSIUM 4.4 02/12/2024     POTASSIUM 3.8 06/21/2021            Lab Results   Component Value Date     CHLORIDE 103 02/12/2024     CHLORIDE 109 06/21/2021            Lab Results   Component Value Date     CO2 25 02/12/2024     CO2 26 06/21/2021            Lab Results   Component Value Date     BUN 7.9 02/12/2024     BUN 11 06/21/2021            Lab Results   Component Value Date     CR 1.07 02/12/2024     CR 1.10 06/21/2021               Lab Results   Component Value Date     WBC 13.9 03/27/2024     WBC 13.7 08/07/2018            Lab Results   Component Value Date     HGB 16.7 03/27/2024     HGB 15.9 06/21/2021            Lab Results   Component Value Date     HCT 48.1 03/27/2024     HCT 47.9 08/07/2018            Lab Results   Component Value Date     MCV 79 03/27/2024     MCV 81 08/07/2018            Lab Results   Component Value Date      03/27/2024      08/07/2018              Lab Results   Component Value Date     AST 50 03/27/2024     AST 23 01/18/2021            Lab Results   Component Value Date     ALT 72 03/27/2024     ALT 31 01/18/2021      No results found for: \"BILICONJ\"         Lab Results   Component Value Date     BILITOTAL 0.4 03/27/2024     BILITOTAL 0.4 07/30/2018               Lab Results   Component Value Date     ALBUMIN 4.6 03/27/2024     ALBUMIN 3.8 03/15/2023     ALBUMIN 3.8 07/30/2018            Lab Results   Component Value Date     PROTTOTAL 7.4 03/27/2024     PROTTOTAL 7.6 07/30/2018            Lab Results   Component Value Date     ALKPHOS 102 03/27/2024     ALKPHOS 115 07/30/2018            Lab Results   Component Value Date     INR 0.84 02/20/2019        Imaging:      CT ABDOMEN PELVIS W  LOCATION: [facility]  DATE: 3/3/2024    INDICATION: Abdominal or pelvic pain  COMPARISON: None.  TECHNIQUE: CT scan of the abdomen and pelvis was performed following injection of IV contrast. Multiplanar reformats were obtained. Dose reduction techniques were used.  CONTRAST: " Omnipaque 350 87mL    FINDINGS:  LOWER CHEST: Normal.    HEPATOBILIARY: Moderate diffuse hepatic steatosis. Cholecystectomy. No biliary ductal dilatation.    PANCREAS: Normal.    SPLEEN: Normal.    ADRENAL GLANDS: Normal.    KIDNEYS/BLADDER: Symmetric enhancement of both kidneys. No hydronephrosis. Urinary bladder only minimally distended though otherwise unremarkable.    BOWEL: Small and large bowel loops are normal in caliber without obstruction. Appendix is normal in appearance. No free fluid or pneumoperitoneum.    LYMPH NODES: No abdominal or pelvic lymphadenopathy.    VASCULATURE: Abdominal aorta normal in caliber with mild mixed atheromatous plaque. Major portal veins are patent.    PELVIC ORGANS: Unremarkable.    MUSCULOSKELETAL: Tiny umbilical hernia containing fat and short segment of nonobstructed small bowel.        US ABDOMEN COMPLETE 2/14/2024      CLINICAL HISTORY: Patient having issue with vomiting in am, pain on  palpation to right upper quadrant and right and left lower quadrant,  history of IBS, had gallbalder and appendix removed; Nausea and  vomiting, unspecified vomiting type  TECHNIQUE: Complete abdominal ultrasound.  COMPARISON: None.     FINDINGS:  GALLBLADDER: Surgically absent.     BILE DUCTS: No biliary dilatation. The common duct measures 6 mm.  Portions of the common duct could not be visualized due to overlying  bowel gas.     LIVER: Increased echogenicity from diffuse fatty infiltration. No  focal hepatic lesions are identified.     RIGHT KIDNEY: Normal size. Normal echogenicity with no hydronephrosis  or mass.      LEFT KIDNEY: Normal size. Normal echogenicity with no hydronephrosis  or mass.      SPLEEN: Normal.     PANCREAS: Pancreas appears heterogenous.     AORTA: Normal caliber where seen.      IVC: Normal where visualized.     No ascites.                                                                    IMPRESSION:  1.  Heterogenous appearance of the pancreas that may suggest  pancreatitis. Recommend correlation with lipase levels and if further concern, suggest CT exam.  2.  Diffuse hepatic steatosis.     CELIA MCKEON MD

## 2024-10-08 NOTE — PROGRESS NOTES
New Patient Oncology Nurse Navigator Note     Referral Received: 10/08/24      Referring provider: Zoya Coon PA-C     Referring Clinic/Organization: [facility]     Referred to: Benign Hematology    Requested provider (if applicable): First available - did not specify      Evaluation for :   Diagnosis   R79.89 (ICD-10-CM) - Abnormal CBC      Clinical History (per Nurse review of records provided):      OV 10/8 Jaymie:   PLAN:  - Reviewed recent labs   - Placed referral to Hematology in setting of continued abnormal CBC results   - Recommended Hepatitis A vaccine with PCP/local pharmacist   - Re-discussed pathophysiology and natural hx of nonalcoholic fatty liver disease   - Continue to work on slow, gradual weight loss  - Maintain good control of cholesterol (No contraindication to starting a statin with recent LFT elevations)   - Optimize blood glucose PRN. Could consider GLP-1 inhibitor for both insulin resistance and help with weight loss  - Limit carbs, especially simple carbs, and follow Mediterranean eating patten  - Increase physical activity as able  - Continue with rare alcohol use in moderation  - Highly encouraged patient work on decreasing coffee intake   - Continue to follow with PCP and GI regarding abdominal pain, nausea, vomiting and irregular bowel issues   - Follow up with me in ~6 months with labs same day   Latest Reference Range & Units 03/27/24 12:04 04/17/24 13:33 10/08/24 10:09   WBC 4.0 - 11.0 10e3/uL 13.9 (H) 13.2 (H) 15.1 (H)   Hemoglobin 13.3 - 17.7 g/dL 16.7 17.8 (H) 16.9   Hematocrit 40.0 - 53.0 % 48.1 51.1 50.3   Platelet Count 150 - 450 10e3/uL 230 213 188   RBC Count 4.40 - 5.90 10e6/uL 6.13 (H) 6.55 (H) 6.59 (H)   MCV 78 - 100 fL 79 78 76 (L)   MCH 26.5 - 33.0 pg 27.2 27.2 25.6 (L)   MCHC 31.5 - 36.5 g/dL 34.7 34.8 33.6   RDW 10.0 - 15.0 % 15.7 (H) 16.0 (H) 17.3 (H)   % Neutrophils % 74     % Lymphocytes % 19     % Monocytes % 6     % Eosinophils % 0     %  Basophils % 0     Absolute Basophils 0.0 - 0.2 10e3/uL 0.1     Absolute Eosinophils 0.0 - 0.7 10e3/uL 0.0     Absolute Immature Granulocytes <=0.4 10e3/uL 0.1     Absolute Lymphocytes 0.8 - 5.3 10e3/uL 2.7     Absolute Monocytes 0.0 - 1.3 10e3/uL 0.8     % Immature Granulocytes % 0     Absolute Neutrophils 1.6 - 8.3 10e3/uL 10.2 (H)     (H): Data is abnormally high  (L): Data is abnormally low     Records Location: [facility]     Additional testing needed prior to consult:     ?    Referral updates and Plan:     10/08/2024 1:24 PM -  Referral received and reviewed. Sent to NPS for scheduling first available. [facility] is about 6 weeks out.     MOHSEN BrownN, RN  Hematology/Oncology Nurse Navigator  [facility]  [phone] / [phone]

## 2024-10-09 NOTE — TELEPHONE ENCOUNTER
RECORDS STATUS - ALL OTHER DIAGNOSIS      RECORDS RECEIVED FROM: [facility] - Internal records   DATE RECEIVED: 10/9

## 2024-10-18 ENCOUNTER — PATIENT OUTREACH (OUTPATIENT)
Dept: CARE COORDINATION | Facility: CLINIC | Age: 59
End: 2024-10-18
Payer: COMMERCIAL

## 2024-11-01 ENCOUNTER — PATIENT OUTREACH (OUTPATIENT)
Dept: CARE COORDINATION | Facility: CLINIC | Age: 59
End: 2024-11-01
Payer: COMMERCIAL

## 2024-11-04 ENCOUNTER — OFFICE VISIT (OUTPATIENT)
Dept: SURGERY | Facility: CLINIC | Age: 59
End: 2024-11-04
Payer: COMMERCIAL

## 2024-11-04 VITALS
HEIGHT: 68 IN | DIASTOLIC BLOOD PRESSURE: 84 MMHG | SYSTOLIC BLOOD PRESSURE: 148 MMHG | WEIGHT: 190 LBS | HEART RATE: 90 BPM | OXYGEN SATURATION: 96 % | BODY MASS INDEX: 28.79 KG/M2

## 2024-11-04 DIAGNOSIS — K42.9 RECURRENT UMBILICAL HERNIA: ICD-10-CM

## 2024-11-04 DIAGNOSIS — M62.08 DIASTASIS RECTI: ICD-10-CM

## 2024-11-04 DIAGNOSIS — R10.12 LUQ ABDOMINAL PAIN: Primary | ICD-10-CM

## 2024-11-04 PROCEDURE — 99243 OFF/OP CNSLTJ NEW/EST LOW 30: CPT | Performed by: SURGERY

## 2024-11-04 RX ORDER — OLANZAPINE 5 MG/1
5 TABLET ORAL AT BEDTIME
COMMUNITY
Start: 2024-10-21

## 2024-11-04 NOTE — PROGRESS NOTES
Patient seen in consultation for hernia by Fela Babin    HPI:  Patient is a 59 year old male  with complaints of bulge in upper abdomen  Gets stomach pain and vomiting every morning  Not sure of cause. Did upper and lower scopes and no definite cause seen there  The patient noticed the symptoms about 3-4 years ago.    Had umbilical hernia repair when had lap aldo in 2010 at [facility]. Pain is more left abdomen into LUQ not periumbilical.   nothing makes the episode better.  Was about 240 lbs when started. Now down to 190. Losing the weight has not helped this pain.    Review Of Systems    Skin: negative  Ears/Nose/Throat: negative  Respiratory: No shortness of breath, dyspnea on exertion, cough, or hemoptysis  Cardiovascular: negative  Gastrointestinal: as above. Fatty liver  Genitourinary: negative  Musculoskeletal: negative  Neurologic: negative  Hematologic/Lymphatic/Immunologic: negative  Endocrine: negative      History reviewed. No pertinent past medical history.  Patient Active Problem List   Diagnosis    History of colonic polyps, 2/2017    Family history of prostate cancer    Restless legs syndrome    Mild major depression (H)    SNHL (sensory-neural hearing loss), asymmetrical    Tinnitus, bilateral    Major depressive disorder, recurrent episode, moderate (H)    Melanoma of skin (H)    Subcoracoid bursitis of left shoulder    Scapular dyskinesis    Rotator cuff impingement syndrome of left shoulder    Patellofemoral syndrome, right    Left knee pain    Impingement syndrome, shoulder, right    Cervical spinal stenosis    Cervical pseudoarthrosis (H)    Biliary colic    Biceps tendinitis of left shoulder    Aftercare following surgery    Aftercare following surgery of the musculoskeletal system    Acute medial meniscus tear of right knee    Tobacco use disorder    Hepatic steatosis    Prediabetes         Past Surgical History:   Procedure Laterality Date    ARTHROSCOPY KNEE RT/LT Left 2013     meniscal tear     ARTHROSCOPY KNEE RT/LT Right 10/2016    medial meniscal tear     COLONOSCOPY  02/2017    Q 3 years for polyps    COLONOSCOPY N/A 12/19/2023    Procedure: COLONOSCOPY, WITH POLYPECTOMY;  Surgeon: Gulshan Braden MD;  Location: [facility]    COLONOSCOPY WITH CO2 INSUFFLATION N/A 02/15/2017    Procedure: COLONOSCOPY WITH CO2 INSUFFLATION;  Surgeon: Galileo Lezama DO;  Location: [facility]    ESOPHAGOSCOPY, GASTROSCOPY, DUODENOSCOPY (EGD), COMBINED N/A 8/28/2024    Procedure: ESOPHAGOGASTRODUODENOSCOPY, WITH BIOPSY;  Surgeon: Estefani Diaz MD;  Location: [facility] OR    HERNIA REPAIR  2010    LAPAROSCOPIC APPENDECTOMY  2010    LAPAROSCOPIC CHOLECYSTECTOMY  2010    SURGICAL HISTORY OF -  Left 1995    corneal transplant X 2 on left eye       Social History     Socioeconomic History    Marital status:      Spouse name: Not on file    Number of children: Not on file    Years of education: Not on file    Highest education level: Not on file   Occupational History    Not on file   Tobacco Use    Smoking status: Light Smoker     Current packs/day: 0.04     Types: Cigarettes    Smokeless tobacco: Never    Tobacco comments:     5-8 cigs per day   Vaping Use    Vaping status: Never Used   Substance and Sexual Activity    Alcohol use: Yes     Comment: 1-2 drinks per year; no hx of alcohol abuse    Drug use: No     Comment: THC gummies for sleep (use for ~2 mo); no hx IVDU    Sexual activity: Yes     Partners: Female   Other Topics Concern    Parent/sibling w/ CABG, MI or angioplasty before 65F 55M? Not Asked   Social History Narrative    Not on file     Social Drivers of Health     Financial Resource Strain: Low Risk  (11/17/2023)    Financial Resource Strain     Within the past 12 months, have you or your family members you live with been unable to get utilities (heat, electricity) when it was really needed?: No   Food Insecurity: Low Risk  (11/17/2023)    Food Insecurity     Within the past 12 months, did you  worry that your food would run out before you got money to buy more?: No     Within the past 12 months, did the food you bought just not last and you didn t have money to get more?: No   Transportation Needs: Low Risk  (11/17/2023)    Transportation Needs     Within the past 12 months, has lack of transportation kept you from medical appointments, getting your medicines, non-medical meetings or appointments, work, or from getting things that you need?: No   Physical Activity: Not on file   Stress: Not on file   Social Connections: Unknown (12/27/2021)    Received [facility], [facility]    Social Connections     Frequency of Communication with Friends and Family: Not on file   Interpersonal Safety: Low Risk  (8/28/2024)    Interpersonal Safety     Do you feel physically and emotionally safe where you currently live?: Yes     Within the past 12 months, have you been hit, slapped, kicked or otherwise physically hurt by someone?: No     Within the past 12 months, have you been humiliated or emotionally abused in other ways by your partner or ex-partner?: No   Housing Stability: Low Risk  (11/17/2023)    Housing Stability     Do you have housing? : Yes     Are you worried about losing your housing?: No       Current Outpatient Medications   Medication Sig Dispense Refill    OLANZapine (ZYPREXA) 5 MG tablet Take 5 mg by mouth at bedtime.      ondansetron (ZOFRAN ODT) 8 MG ODT tab Take 1 tablet (8 mg) by mouth every 8 hours as needed for nausea 20 tablet 1    oxyCODONE-acetaminophen (PERCOCET) 5-325 MG tablet Take 1 tablet by mouth 2 times daily as needed for pain.      hydrOXYzine HCl (ATARAX) 25 MG tablet TAKE 1 TABLET(25 MG) BY MOUTH THREE TIMES DAILY AS NEEDED FOR ITCHING OR NAUSEA OR VOMITING (Patient not taking: Reported on 11/4/2024) 60 tablet 1    metoclopramide (REGLAN) 5 MG tablet Take 1 tablet (5 mg) by mouth 3 times daily as needed  "(vomiting). (Patient not taking: Reported on 11/4/2024) 30 tablet 0    traMADol (ULTRAM) 50 MG tablet Take 50 mg by mouth 2 times daily (Patient not taking: Reported on 9/20/2024)         Medications and history reviewed    Physical exam:  Vitals: BP (!) 148/84 (BP Location: Right arm, Patient Position: Sitting, Cuff Size: Adult Regular)   Pulse 90   Ht 1.727 m (5' 8\")   Wt 86.2 kg (190 lb)   SpO2 96%   BMI 28.89 kg/m    BMI= Body mass index is 28.89 kg/m .    Constitutional: healthy, alert, and no distress  Head: Normocephalic. No masses, lesions, tenderness or abnormalities  Gastrointestinal: positive findings: small easily reducible umbilical hernia, defect about 2.5 cm. Non-tender. Moderate diastasis recti, obese  : Deferred  Musculoskeletal: extremities normal- no gross deformities noted, gait normal, and normal muscle tone  Skin: no suspicious lesions or rashes  Psychiatric: mentation appears normal and affect normal/bright  Patient able to get up on table without difficulty.    Imaging shows:    Impression:            - Area of nodular mucosa was found in the esophagus.                          Biopsied.                          - Z-line regular, 40 cm from the incisors.                          - Gastroesophageal flap valve classified as Hill Grade                          II (fold present, opens with respiration).                          - Normal stomach. Biopsied.                          - Normal duodenum.     CT ABDOMEN PELVIS W CONTRAST  Order: [id number]  Impression    1.  No CT evidence of acute abdominal or pelvic process.  Narrative    For Patients: As a result of the 21st Century Cures Act, medical imaging exams and procedure reports are released immediately into your electronic medical record. You may view this report before your referring provider. If you have questions, please contact your health care provider.    EXAM: CT ABDOMEN PELVIS W  LOCATION: [facility]  DATE: " 3/3/2024    INDICATION: Abdominal or pelvic pain  COMPARISON: None.  TECHNIQUE: CT scan of the abdomen and pelvis was performed following injection of IV contrast. Multiplanar reformats were obtained. Dose reduction techniques were used.  CONTRAST: Omnipaque 350 87mL    FINDINGS:  LOWER CHEST: Normal.    HEPATOBILIARY: Moderate diffuse hepatic steatosis. Cholecystectomy. No biliary ductal dilatation.    PANCREAS: Normal.    SPLEEN: Normal.    ADRENAL GLANDS: Normal.    KIDNEYS/BLADDER: Symmetric enhancement of both kidneys. No hydronephrosis. Urinary bladder only minimally distended though otherwise unremarkable.    BOWEL: Small and large bowel loops are normal in caliber without obstruction. Appendix is normal in appearance. No free fluid or pneumoperitoneum.    LYMPH NODES: No abdominal or pelvic lymphadenopathy.    VASCULATURE: Abdominal aorta normal in caliber with mild mixed atheromatous plaque. Major portal veins are patent.    PELVIC ORGANS: Unremarkable.    MUSCULOSKELETAL: Tiny umbilical hernia containing fat and short segment of nonobstructed small bowel.    Assessment:     ICD-10-CM    1. LUQ abdominal pain  R10.12 Adult Gen Surg  Referral      2. Diastasis recti  M62.08       3. Recurrent umbilical hernia  K42.9         Plan: Sure of the cause of this chronic left sided to left upper quadrant abdominal pain.  He has the diastases rectus and a small recurrent umbilical hernia which appear to be asymptomatic.  These are unlikely because of the pain given its location.  He is already done further workup with both upper and lower scopes and cross-sectional imaging and unfortunately no definite cause seen.  I think it is unlikely that repair of the hernia and diastases would fix those symptoms.  I discussed that it is an option as there is a recurrent hernia.  Would approach robotically to not only address the hernia but then also plicate the diastases and reinforce the whole area with mesh.  Per  patient it did have mesh with old repair but I was unable to confirm by finding old op report.  The patient's main concern is this pain we will wait on any hernia repair.  Discussed ongoing monitoring and if he starts noticing more pain with the hernia, noticing that it is no longer soft and reducible then to have that checked out potentially in the ER and if there is any obstruction or incarceration in case emergency repair is needed.  In general this should be very low likelihood.  If he is simply noticing more umbilical area pain in the absence of those other or concerning symptoms he can follow-up with me if we can reconsider repair.    Dov Amos MD

## 2024-11-04 NOTE — LETTER
11/4/2024      Pop Eaton III  [address] [facility] [address]      Dear Colleague,    Thank you for referring your patient, Pop Eaton III, to the [facility]. Please see a copy of my visit note below.    Patient seen in consultation for hernia by Fela Babin    HPI:  Patient is a 59 year old male  with complaints of bulge in upper abdomen  Gets stomach pain and vomiting every morning  Not sure of cause. Did upper and lower scopes and no definite cause seen there  The patient noticed the symptoms about 3-4 years ago.    Had umbilical hernia repair when had lap aldo in 2010 at [facility]. Pain is more left abdomen into LUQ not periumbilical.   nothing makes the episode better.  Was about 240 lbs when started. Now down to 190. Losing the weight has not helped this pain.    Review Of Systems    Skin: negative  Ears/Nose/Throat: negative  Respiratory: No shortness of breath, dyspnea on exertion, cough, or hemoptysis  Cardiovascular: negative  Gastrointestinal: as above. Fatty liver  Genitourinary: negative  Musculoskeletal: negative  Neurologic: negative  Hematologic/Lymphatic/Immunologic: negative  Endocrine: negative      History reviewed. No pertinent past medical history.  Patient Active Problem List   Diagnosis     History of colonic polyps, 2/2017     Family history of prostate cancer     Restless legs syndrome     Mild major depression (H)     SNHL (sensory-neural hearing loss), asymmetrical     Tinnitus, bilateral     Major depressive disorder, recurrent episode, moderate (H)     Melanoma of skin (H)     Subcoracoid bursitis of left shoulder     Scapular dyskinesis     Rotator cuff impingement syndrome of left shoulder     Patellofemoral syndrome, right     Left knee pain     Impingement syndrome, shoulder, right     Cervical spinal stenosis     Cervical pseudoarthrosis (H)     Biliary colic     Biceps tendinitis of left shoulder     Aftercare following  surgery     Aftercare following surgery of the musculoskeletal system     Acute medial meniscus tear of right knee     Tobacco use disorder     Hepatic steatosis     Prediabetes         Past Surgical History:   Procedure Laterality Date     ARTHROSCOPY KNEE RT/LT Left 2013    meniscal tear      ARTHROSCOPY KNEE RT/LT Right 10/2016    medial meniscal tear      COLONOSCOPY  02/2017    Q 3 years for polyps     COLONOSCOPY N/A 12/19/2023    Procedure: COLONOSCOPY, WITH POLYPECTOMY;  Surgeon: Gulshan Braden MD;  Location: [facility]     COLONOSCOPY WITH CO2 INSUFFLATION N/A 02/15/2017    Procedure: COLONOSCOPY WITH CO2 INSUFFLATION;  Surgeon: Galileo Lezama DO;  Location: [facility]     ESOPHAGOSCOPY, GASTROSCOPY, DUODENOSCOPY (EGD), COMBINED N/A 8/28/2024    Procedure: ESOPHAGOGASTRODUODENOSCOPY, WITH BIOPSY;  Surgeon: Estefani Diaz MD;  Location: [facility] OR     HERNIA REPAIR  2010     LAPAROSCOPIC APPENDECTOMY  2010     LAPAROSCOPIC CHOLECYSTECTOMY  2010     SURGICAL HISTORY OF -  Left 1995    corneal transplant X 2 on left eye       Social History     Socioeconomic History     Marital status:      Spouse name: Not on file     Number of children: Not on file     Years of education: Not on file     Highest education level: Not on file   Occupational History     Not on file   Tobacco Use     Smoking status: Light Smoker     Current packs/day: 0.04     Types: Cigarettes     Smokeless tobacco: Never     Tobacco comments:     5-8 cigs per day   Vaping Use     Vaping status: Never Used   Substance and Sexual Activity     Alcohol use: Yes     Comment: 1-2 drinks per year; no hx of alcohol abuse     Drug use: No     Comment: THC gummies for sleep (use for ~2 mo); no hx IVDU     Sexual activity: Yes     Partners: Female   Other Topics Concern     Parent/sibling w/ CABG, MI or angioplasty before 65F 55M? Not Asked   Social History Narrative     Not on file     Social Drivers of Health     Financial Resource Strain: Low Risk   (11/17/2023)    Financial Resource Strain      Within the past 12 months, have you or your family members you live with been unable to get utilities (heat, electricity) when it was really needed?: No   Food Insecurity: Low Risk  (11/17/2023)    Food Insecurity      Within the past 12 months, did you worry that your food would run out before you got money to buy more?: No      Within the past 12 months, did the food you bought just not last and you didn t have money to get more?: No   Transportation Needs: Low Risk  (11/17/2023)    Transportation Needs      Within the past 12 months, has lack of transportation kept you from medical appointments, getting your medicines, non-medical meetings or appointments, work, or from getting things that you need?: No   Physical Activity: Not on file   Stress: Not on file   Social Connections: Unknown (12/27/2021)    Received from [facility], [facility]    Social Connections      Frequency of Communication with Friends and Family: Not on file   Interpersonal Safety: Low Risk  (8/28/2024)    Interpersonal Safety      Do you feel physically and emotionally safe where you currently live?: Yes      Within the past 12 months, have you been hit, slapped, kicked or otherwise physically hurt by someone?: No      Within the past 12 months, have you been humiliated or emotionally abused in other ways by your partner or ex-partner?: No   Housing Stability: Low Risk  (11/17/2023)    Housing Stability      Do you have housing? : Yes      Are you worried about losing your housing?: No       Current Outpatient Medications   Medication Sig Dispense Refill     OLANZapine (ZYPREXA) 5 MG tablet Take 5 mg by mouth at bedtime.       ondansetron (ZOFRAN ODT) 8 MG ODT tab Take 1 tablet (8 mg) by mouth every 8 hours as needed for nausea 20 tablet 1     oxyCODONE-acetaminophen (PERCOCET) 5-325 MG tablet Take 1 tablet by mouth 2 times daily  "as needed for pain.       hydrOXYzine HCl (ATARAX) 25 MG tablet TAKE 1 TABLET(25 MG) BY MOUTH THREE TIMES DAILY AS NEEDED FOR ITCHING OR NAUSEA OR VOMITING (Patient not taking: Reported on 11/4/2024) 60 tablet 1     metoclopramide (REGLAN) 5 MG tablet Take 1 tablet (5 mg) by mouth 3 times daily as needed (vomiting). (Patient not taking: Reported on 11/4/2024) 30 tablet 0     traMADol (ULTRAM) 50 MG tablet Take 50 mg by mouth 2 times daily (Patient not taking: Reported on 9/20/2024)         Medications and history reviewed    Physical exam:  Vitals: BP (!) 148/84 (BP Location: Right arm, Patient Position: Sitting, Cuff Size: Adult Regular)   Pulse 90   Ht 1.727 m (5' 8\")   Wt 86.2 kg (190 lb)   SpO2 96%   BMI 28.89 kg/m    BMI= Body mass index is 28.89 kg/m .    Constitutional: healthy, alert, and no distress  Head: Normocephalic. No masses, lesions, tenderness or abnormalities  Gastrointestinal: positive findings: small easily reducible umbilical hernia, defect about 2.5 cm. Non-tender. Moderate diastasis recti, obese  : Deferred  Musculoskeletal: extremities normal- no gross deformities noted, gait normal, and normal muscle tone  Skin: no suspicious lesions or rashes  Psychiatric: mentation appears normal and affect normal/bright  Patient able to get up on table without difficulty.    Imaging shows:    Impression:            - Area of nodular mucosa was found in the esophagus.                          Biopsied.                          - Z-line regular, 40 cm from the incisors.                          - Gastroesophageal flap valve classified as Hill Grade                          II (fold present, opens with respiration).                          - Normal stomach. Biopsied.                          - Normal duodenum.     CT ABDOMEN PELVIS W CONTRAST  Order: [id number]  Impression    1.  No CT evidence of acute abdominal or pelvic process.  Narrative    For Patients: As a result of the 21st Century Cures Act, " medical imaging exams and procedure reports are released immediately into your electronic medical record. You may view this report before your referring provider. If you have questions, please contact your health care provider.    EXAM: CT ABDOMEN PELVIS W  LOCATION: [facility]  DATE: 3/3/2024    INDICATION: Abdominal or pelvic pain  COMPARISON: None.  TECHNIQUE: CT scan of the abdomen and pelvis was performed following injection of IV contrast. Multiplanar reformats were obtained. Dose reduction techniques were used.  CONTRAST: Omnipaque 350 87mL    FINDINGS:  LOWER CHEST: Normal.    HEPATOBILIARY: Moderate diffuse hepatic steatosis. Cholecystectomy. No biliary ductal dilatation.    PANCREAS: Normal.    SPLEEN: Normal.    ADRENAL GLANDS: Normal.    KIDNEYS/BLADDER: Symmetric enhancement of both kidneys. No hydronephrosis. Urinary bladder only minimally distended though otherwise unremarkable.    BOWEL: Small and large bowel loops are normal in caliber without obstruction. Appendix is normal in appearance. No free fluid or pneumoperitoneum.    LYMPH NODES: No abdominal or pelvic lymphadenopathy.    VASCULATURE: Abdominal aorta normal in caliber with mild mixed atheromatous plaque. Major portal veins are patent.    PELVIC ORGANS: Unremarkable.    MUSCULOSKELETAL: Tiny umbilical hernia containing fat and short segment of nonobstructed small bowel.    Assessment:     ICD-10-CM    1. LUQ abdominal pain  R10.12 Adult Gen Surg  Referral      2. Diastasis recti  M62.08       3. Recurrent umbilical hernia  K42.9         Plan: Sure of the cause of this chronic left sided to left upper quadrant abdominal pain.  He has the diastases rectus and a small recurrent umbilical hernia which appear to be asymptomatic.  These are unlikely because of the pain given its location.  He is already done further workup with both upper and lower scopes and cross-sectional imaging and unfortunately no definite cause seen.  I think  it is unlikely that repair of the hernia and diastases would fix those symptoms.  I discussed that it is an option as there is a recurrent hernia.  Would approach robotically to not only address the hernia but then also plicate the diastases and reinforce the whole area with mesh.  Per patient it did have mesh with old repair but I was unable to confirm by finding old op report.  The patient's main concern is this pain we will wait on any hernia repair.  Discussed ongoing monitoring and if he starts noticing more pain with the hernia, noticing that it is no longer soft and reducible then to have that checked out potentially in the ER and if there is any obstruction or incarceration in case emergency repair is needed.  In general this should be very low likelihood.  If he is simply noticing more umbilical area pain in the absence of those other or concerning symptoms he can follow-up with me if we can reconsider repair.    Dov Amos MD      Again, thank you for allowing me to participate in the care of your patient.        Sincerely,        Dov Amos MD

## 2024-11-05 ENCOUNTER — TRANSFERRED RECORDS (OUTPATIENT)
Dept: HEALTH INFORMATION MANAGEMENT | Facility: CLINIC | Age: 59
End: 2024-11-05
Payer: COMMERCIAL

## 2024-11-12 ENCOUNTER — TELEPHONE (OUTPATIENT)
Dept: SURGERY | Facility: CLINIC | Age: 59
End: 2024-11-12
Payer: COMMERCIAL

## 2024-11-12 NOTE — TELEPHONE ENCOUNTER
[facility] Call Center    Phone Message    May a detailed message be left on voicemail: yes     Reason for Call: The patient called stating he would like to reconsider surgery. Please contact patient.     Action Taken: Message routed to:  Other: Gen Surg    Travel Screening: Not Applicable

## 2024-11-12 NOTE — TELEPHONE ENCOUNTER
Patient needs appointment to discuss surgery. Scheduled video visit with Dr. Amos 11/25. Patient confirmed understanding.     Katelin THOMPSON RN, [facility] 11/12/24 11:26 AM

## 2024-11-16 NOTE — PROGRESS NOTES
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    Impression:  Chronic neutrophilic leukocytosis and borderline erythrocytosis; could be due to chronic tobacco use and/or sleep apnea.  Want to rule out primary hematologic disorders which I think is unlikely.  Subacute recurrent unexplained emesis and left upper quadrant abdominal pain been evaluated by GI    Recommendation, plan, instructions:  Referral for sleep study  Hemochromatosis gene assay, JAK2 plus reflex, erythropoietin level, carbon monoxide level, CBC/differential, smear, BCR-ABL gene rearrangement, soluble transferrin receptor; I will call back with blood test results and what to do next.  The patient understands and agrees.    Time with patient including review, documentation, history, examination, coordination of care and counseling was 25 minutes.    History of present illness:  This patient has been seen in the [facility] clinic because of hepatic steatosis for which fibrosis has been excluded and for which he is thought to have nonalcoholic fatty liver disease.    However, review of complete blood counts dating back to 2018 demonstrates a history of chronic, nonprogressive neutrophilic leukocytosis with recent hemoglobins over 17 and hematocrit over 50% with MCV's dropping down to 76.    October, 2024 ferritin was 170, iron 103, TIBC 324, saturation 32%.  There was a negative March, 2024 CT abdomen pelvis except for hepatic steatosis without splenomegaly or renal masses.  Recent hepatic panel was unremarkable including liver transaminases.    The patient's past history is remarkable for biliary colic,  apparent malignant melanoma, depression, cervical spinal stenosis, restless leg syndrome, PTSD and tobacco use.  Does not use alcohol.    Past medical history:  No past medical history on file.      Family history:  I have reviewed this patient's family history and updated it with pertinent information if needed.  Family History   Problem Relation Age of Onset    Prostate Cancer Maternal Uncle         X2    Liver Disease No family hx of     Liver Cancer No family hx of          Medications:  Current Outpatient Medications   Medication Sig Dispense Refill    hydrOXYzine HCl (ATARAX) 25 MG tablet TAKE 1 TABLET(25 MG) BY MOUTH THREE TIMES DAILY AS NEEDED FOR ITCHING OR NAUSEA OR VOMITING 60 tablet 1    metoclopramide (REGLAN) 5 MG tablet Take 1 tablet (5 mg) by mouth 3 times daily as needed (vomiting). 30 tablet 0    OLANZapine (ZYPREXA) 2.5 MG tablet TAKE 1 TABLET BY MOUTH AT BEDTIME ALONG WITH 5MG. MAY HAVE 1 ADDITIONAL AS NEEDED DAILY      OLANZapine (ZYPREXA) 5 MG tablet Take 5 mg by mouth at bedtime.      OLANZapine (ZYPREXA) 7.5 MG tablet Take 7.5 mg by mouth at bedtime.      traMADol (ULTRAM) 50 MG tablet Take 50 mg by mouth 2 times daily.      ondansetron (ZOFRAN ODT) 8 MG ODT tab Take 1 tablet (8 mg) by mouth every 8 hours as needed for nausea (Patient not taking: Reported on 11/25/2024) 20 tablet 1     No current facility-administered medications for this visit.       Allergies:  Allergies   Allergen Reactions    Wellbutrin [Bupropion]        Review of systems:  Snoring for the last 10 years according to patient's wife.  He smokes 5 cigarettes daily.    Except as note above, the patient denies:  Headache, double vision, change in vision, hearing loss, tinnitus;  Dyspnea, chest pain, palpitations, pleurisy or hemoptysis;  Anorexia, nausea, vomiting, diarrhea, constipation, rectal bleeding bleeding, change in bowel habits;  Urinary frequency, burning or hematuria;  Fever, chills, sweats, change in  "appetite;  Bone or back pain; skin rash, joint swelling, new lumps;  Unexplained, bleeding or bruising, tingling numbness, change in gait;    Physical examination:  /76   Pulse 70   Resp 18   Ht 1.727 m (5' 7.99\")   Wt 90.5 kg (199 lb 8 oz)   SpO2 93%   BMI 30.34 kg/m      The patient is alert and oriented.   Throat and oral mucosa are normal-appearing.   Adenopathy is absent in the neck, axilla, groin and supraclavicular fossa;   Lungs are clear to auscultation and percussion without rales, wheezes or rubs; Heart rhythm is regular, heart sounds are without murmurs or gallops;   Abdomen is soft and flat without hepatosplenomegaly, masses, ascites or tenderness;   Extremities and skin reveal no unusual skin lesions, joint swelling or edema;    Jamel Galan MD            "

## 2024-11-25 ENCOUNTER — PRE VISIT (OUTPATIENT)
Dept: ONCOLOGY | Facility: CLINIC | Age: 59
End: 2024-11-25
Payer: COMMERCIAL

## 2024-11-25 ENCOUNTER — ONCOLOGY VISIT (OUTPATIENT)
Dept: ONCOLOGY | Facility: CLINIC | Age: 59
End: 2024-11-25
Attending: PHYSICIAN ASSISTANT
Payer: COMMERCIAL

## 2024-11-25 ENCOUNTER — OFFICE VISIT (OUTPATIENT)
Dept: SURGERY | Facility: CLINIC | Age: 59
End: 2024-11-25
Payer: COMMERCIAL

## 2024-11-25 ENCOUNTER — TELEPHONE (OUTPATIENT)
Dept: SURGERY | Facility: CLINIC | Age: 59
End: 2024-11-25

## 2024-11-25 VITALS
DIASTOLIC BLOOD PRESSURE: 94 MMHG | HEART RATE: 73 BPM | BODY MASS INDEX: 29.95 KG/M2 | WEIGHT: 197 LBS | SYSTOLIC BLOOD PRESSURE: 163 MMHG

## 2024-11-25 VITALS
HEART RATE: 70 BPM | SYSTOLIC BLOOD PRESSURE: 122 MMHG | WEIGHT: 199.5 LBS | BODY MASS INDEX: 30.23 KG/M2 | DIASTOLIC BLOOD PRESSURE: 76 MMHG | HEIGHT: 68 IN | RESPIRATION RATE: 18 BRPM | OXYGEN SATURATION: 93 %

## 2024-11-25 DIAGNOSIS — R10.9 CHRONIC ABDOMINAL PAIN: ICD-10-CM

## 2024-11-25 DIAGNOSIS — M62.08 DIASTASIS RECTI: ICD-10-CM

## 2024-11-25 DIAGNOSIS — R06.83 SNORING: ICD-10-CM

## 2024-11-25 DIAGNOSIS — R79.89 ABNORMAL CBC: ICD-10-CM

## 2024-11-25 DIAGNOSIS — D75.1 ERYTHROCYTOSIS: Primary | ICD-10-CM

## 2024-11-25 DIAGNOSIS — G89.29 CHRONIC ABDOMINAL PAIN: ICD-10-CM

## 2024-11-25 DIAGNOSIS — R71.8 MICROCYTOSIS: ICD-10-CM

## 2024-11-25 DIAGNOSIS — D72.829 LEUKOCYTOSIS, UNSPECIFIED TYPE: ICD-10-CM

## 2024-11-25 DIAGNOSIS — K42.9 UMBILICAL HERNIA WITHOUT OBSTRUCTION AND WITHOUT GANGRENE: Primary | ICD-10-CM

## 2024-11-25 LAB
BASOPHILS # BLD AUTO: 0.1 10E3/UL (ref 0–0.2)
BASOPHILS NFR BLD AUTO: 1 %
COHGB MFR BLD: 8.4 % (ref 0–2)
ELLIPTOCYTES BLD QL SMEAR: SLIGHT
EOSINOPHIL # BLD AUTO: 0.4 10E3/UL (ref 0–0.7)
EOSINOPHIL NFR BLD AUTO: 3 %
ERYTHROCYTE [DISTWIDTH] IN BLOOD BY AUTOMATED COUNT: 15.9 % (ref 10–15)
HCT VFR BLD AUTO: 46 % (ref 40–53)
HGB BLD-MCNC: 15.7 G/DL (ref 13.3–17.7)
IMM GRANULOCYTES # BLD: 0.1 10E3/UL
IMM GRANULOCYTES NFR BLD: 0 %
LAB DIRECTOR COMMENTS: NORMAL
LAB DIRECTOR DISCLAIMER: NORMAL
LAB DIRECTOR INTERPRETATION: NORMAL
LAB DIRECTOR METHODOLOGY: NORMAL
LAB DIRECTOR RESULTS: NORMAL
LYMPHOCYTES # BLD AUTO: 5.5 10E3/UL (ref 0.8–5.3)
LYMPHOCYTES NFR BLD AUTO: 37 %
MCH RBC QN AUTO: 26.2 PG (ref 26.5–33)
MCHC RBC AUTO-ENTMCNC: 34.1 G/DL (ref 31.5–36.5)
MCV RBC AUTO: 77 FL (ref 78–100)
MONOCYTES # BLD AUTO: 0.7 10E3/UL (ref 0–1.3)
MONOCYTES NFR BLD AUTO: 5 %
NEUTROPHILS # BLD AUTO: 8.1 10E3/UL (ref 1.6–8.3)
NEUTROPHILS NFR BLD AUTO: 55 %
NRBC # BLD AUTO: 0 10E3/UL
NRBC BLD AUTO-RTO: 0 /100
PLAT MORPH BLD: ABNORMAL
PLATELET # BLD AUTO: 177 10E3/UL (ref 150–450)
RBC # BLD AUTO: 6 10E6/UL (ref 4.4–5.9)
RBC MORPH BLD: ABNORMAL
RETICS # AUTO: 0.09 10E6/UL
RETICS/RBC NFR AUTO: 1.6 %
SPECIMEN TYPE: NORMAL
VARIANT LYMPHS BLD QL SMEAR: PRESENT
WBC # BLD AUTO: 14.7 10E3/UL (ref 4–11)

## 2024-11-25 PROCEDURE — 82668 ASSAY OF ERYTHROPOIETIN: CPT | Performed by: INTERNAL MEDICINE

## 2024-11-25 PROCEDURE — 99213 OFFICE O/P EST LOW 20 MIN: CPT | Performed by: SURGERY

## 2024-11-25 PROCEDURE — 84238 ASSAY NONENDOCRINE RECEPTOR: CPT | Performed by: INTERNAL MEDICINE

## 2024-11-25 PROCEDURE — 81270 JAK2 GENE: CPT | Performed by: INTERNAL MEDICINE

## 2024-11-25 PROCEDURE — 81338 MPL GENE COMMON VARIANTS: CPT | Performed by: INTERNAL MEDICINE

## 2024-11-25 PROCEDURE — 81256 HFE GENE: CPT | Performed by: INTERNAL MEDICINE

## 2024-11-25 PROCEDURE — 81207 BCR/ABL1 GENE MINOR BP: CPT | Performed by: INTERNAL MEDICINE

## 2024-11-25 PROCEDURE — 99202 OFFICE O/P NEW SF 15 MIN: CPT | Performed by: INTERNAL MEDICINE

## 2024-11-25 PROCEDURE — 85025 COMPLETE CBC W/AUTO DIFF WBC: CPT | Performed by: INTERNAL MEDICINE

## 2024-11-25 PROCEDURE — 81339 MPL GENE SEQ ALYS EXON 10: CPT | Performed by: INTERNAL MEDICINE

## 2024-11-25 PROCEDURE — G0452 MOLECULAR PATHOLOGY INTERPR: HCPCS | Mod: 26 | Performed by: PATHOLOGY

## 2024-11-25 PROCEDURE — 81206 BCR/ABL1 GENE MAJOR BP: CPT | Performed by: INTERNAL MEDICINE

## 2024-11-25 PROCEDURE — 36415 COLL VENOUS BLD VENIPUNCTURE: CPT | Performed by: INTERNAL MEDICINE

## 2024-11-25 PROCEDURE — 81279 JAK2 GENE TRGT SEQUENCE ALYS: CPT | Performed by: INTERNAL MEDICINE

## 2024-11-25 PROCEDURE — 85045 AUTOMATED RETICULOCYTE COUNT: CPT | Performed by: INTERNAL MEDICINE

## 2024-11-25 PROCEDURE — 82375 ASSAY CARBOXYHB QUANT: CPT | Performed by: INTERNAL MEDICINE

## 2024-11-25 PROCEDURE — 99213 OFFICE O/P EST LOW 20 MIN: CPT | Performed by: INTERNAL MEDICINE

## 2024-11-25 RX ORDER — OLANZAPINE 7.5 MG/1
7.5 TABLET, FILM COATED ORAL AT BEDTIME
COMMUNITY
Start: 2024-11-15

## 2024-11-25 RX ORDER — OLANZAPINE 2.5 MG/1
TABLET, FILM COATED ORAL
COMMUNITY
Start: 2024-11-11

## 2024-11-25 ASSESSMENT — PAIN SCALES - GENERAL: PAINLEVEL_OUTOF10: SEVERE PAIN (7)

## 2024-11-25 NOTE — NURSING NOTE
"Oncology Rooming Note    November 25, 2024 3:14 PM   Pop Eaton III is a 59 year old male who presents for:    Chief Complaint   Patient presents with    Oncology Clinic Visit     New patient- hematology     Initial Vitals: /76   Pulse 70   Resp 18   Ht 1.727 m (5' 7.99\")   Wt 90.5 kg (199 lb 8 oz)   SpO2 93%   BMI 30.34 kg/m   Estimated body mass index is 30.34 kg/m  as calculated from the following:    Height as of this encounter: 1.727 m (5' 7.99\").    Weight as of this encounter: 90.5 kg (199 lb 8 oz). Body surface area is 2.08 meters squared.  Severe Pain (7) Comment: Data Unavailable   No LMP for male patient.  Allergies reviewed: Yes  Medications reviewed: Yes    Medications: Medication refills not needed today.  Pharmacy name entered into [employer]: [id number] [address] AT [facility]    Frailty Screening:   Is the patient here for a new oncology consult visit in cancer care? 2. No      Clinical concerns: abnormal CBC       Brandy Waterman LPN              "

## 2024-11-25 NOTE — LETTER
"11/25/2024      Pop Eaton III  [address] [facility] [address]      Dear Colleague,    Thank you for referring your patient, Pop Eaton III, to the [facility]. Please see a copy of my visit note below.    Patient seen in follow-up for his hernia    HPI:  Patient is a 59 year old male  with complaints of the ongoing chronic left abdominal pain  Saw me in clinic earlier this month for this  Now \"grasping at straws\" for doing anything that might help even if low chance so wanted to talk again about hernia repair.  The patient noticed the symptoms about 1 year ago.    nothing makes the episode better.    From last visit:  Patient is a 59 year old male  with complaints of bulge in upper abdomen  Gets stomach pain and vomiting every morning  Not sure of cause. Did upper and lower scopes and no definite cause seen there  The patient noticed the symptoms about 3-4 years ago.    Had umbilical hernia repair when had lap aldo in 2010 at [facility]. Pain is more left abdomen into LUQ not periumbilical.   nothing makes the episode better.  Was about 240 lbs when started. Now down to 190. Losing the weight has not helped this pain.      No past medical history on file.  Patient Active Problem List   Diagnosis     History of colonic polyps, 2/2017     Family history of prostate cancer     Restless legs syndrome     Mild major depression (H)     SNHL (sensory-neural hearing loss), asymmetrical     Tinnitus, bilateral     Major depressive disorder, recurrent episode, moderate (H)     Melanoma of skin (H)     Subcoracoid bursitis of left shoulder     Scapular dyskinesis     Rotator cuff impingement syndrome of left shoulder     Patellofemoral syndrome, right     Left knee pain     Impingement syndrome, shoulder, right     Cervical spinal stenosis     Cervical pseudoarthrosis (H)     Biliary colic     Biceps tendinitis of left shoulder     Aftercare following surgery     Aftercare following " surgery of the musculoskeletal system     Acute medial meniscus tear of right knee     Tobacco use disorder     Hepatic steatosis     Prediabetes         Past Surgical History:   Procedure Laterality Date     ARTHROSCOPY KNEE RT/LT Left 2013    meniscal tear      ARTHROSCOPY KNEE RT/LT Right 10/2016    medial meniscal tear      COLONOSCOPY  02/2017    Q 3 years for polyps     COLONOSCOPY N/A 12/19/2023    Procedure: COLONOSCOPY, WITH POLYPECTOMY;  Surgeon: Gulshan Braden MD;  Location: [facility]     COLONOSCOPY WITH CO2 INSUFFLATION N/A 02/15/2017    Procedure: COLONOSCOPY WITH CO2 INSUFFLATION;  Surgeon: Galileo Lezama DO;  Location: [facility]     ESOPHAGOSCOPY, GASTROSCOPY, DUODENOSCOPY (EGD), COMBINED N/A 8/28/2024    Procedure: ESOPHAGOGASTRODUODENOSCOPY, WITH BIOPSY;  Surgeon: Estefani Diaz MD;  Location: [facility] OR     HERNIA REPAIR  2010     LAPAROSCOPIC APPENDECTOMY  2010     LAPAROSCOPIC CHOLECYSTECTOMY  2010     SURGICAL HISTORY OF -  Left 1995    corneal transplant X 2 on left eye       Social History     Socioeconomic History     Marital status:      Spouse name: Not on file     Number of children: Not on file     Years of education: Not on file     Highest education level: Not on file   Occupational History     Not on file   Tobacco Use     Smoking status: Light Smoker     Current packs/day: 0.04     Types: Cigarettes     Smokeless tobacco: Never     Tobacco comments:     5-8 cigs per day   Vaping Use     Vaping status: Never Used   Substance and Sexual Activity     Alcohol use: Yes     Comment: 1-2 drinks per year; no hx of alcohol abuse     Drug use: No     Comment: THC gummies for sleep (use for ~2 mo); no hx IVDU     Sexual activity: Yes     Partners: Female   Other Topics Concern     Parent/sibling w/ CABG, MI or angioplasty before 65F 55M? Not Asked   Social History Narrative     Not on file     Social Drivers of Health     Financial Resource Strain: Low Risk  (11/17/2023)    Financial  Resource Strain      Within the past 12 months, have you or your family members you live with been unable to get utilities (heat, electricity) when it was really needed?: No   Food Insecurity: Low Risk  (11/17/2023)    Food Insecurity      Within the past 12 months, did you worry that your food would run out before you got money to buy more?: No      Within the past 12 months, did the food you bought just not last and you didn t have money to get more?: No   Transportation Needs: Low Risk  (11/17/2023)    Transportation Needs      Within the past 12 months, has lack of transportation kept you from medical appointments, getting your medicines, non-medical meetings or appointments, work, or from getting things that you need?: No   Physical Activity: Not on file   Stress: Not on file   Social Connections: Unknown (12/27/2021)    Received from [facility]    Social Connections      Frequency of Communication with Friends and Family: Not on file   Interpersonal Safety: Low Risk  (8/28/2024)    Interpersonal Safety      Do you feel physically and emotionally safe where you currently live?: Yes      Within the past 12 months, have you been hit, slapped, kicked or otherwise physically hurt by someone?: No      Within the past 12 months, have you been humiliated or emotionally abused in other ways by your partner or ex-partner?: No   Housing Stability: Low Risk  (11/17/2023)    Housing Stability      Do you have housing? : Yes      Are you worried about losing your housing?: No       Current Outpatient Medications   Medication Sig Dispense Refill     hydrOXYzine HCl (ATARAX) 25 MG tablet TAKE 1 TABLET(25 MG) BY MOUTH THREE TIMES DAILY AS NEEDED FOR ITCHING OR NAUSEA OR VOMITING (Patient not taking: Reported on 11/4/2024) 60 tablet 1     metoclopramide (REGLAN) 5 MG tablet Take 1 tablet (5 mg) by mouth 3 times daily as needed (vomiting). (Patient  not taking: Reported on 11/4/2024) 30 tablet 0     OLANZapine (ZYPREXA) 5 MG tablet Take 5 mg by mouth at bedtime.       ondansetron (ZOFRAN ODT) 8 MG ODT tab Take 1 tablet (8 mg) by mouth every 8 hours as needed for nausea 20 tablet 1     oxyCODONE-acetaminophen (PERCOCET) 5-325 MG tablet Take 1 tablet by mouth 2 times daily as needed for pain.       traMADol (ULTRAM) 50 MG tablet Take 50 mg by mouth 2 times daily (Patient not taking: Reported on 9/20/2024)         Medications and history reviewed    Physical exam:  Vitals: BP (!) 163/94   Pulse 73   Wt 89.4 kg (197 lb)   BMI 29.95 kg/m    BMI= Body mass index is 29.95 kg/m .    Constitutional: healthy, alert, and no distress  Head: Normocephalic. No masses, lesions, tenderness or abnormalities  Gastrointestinal: positive findings: small easily reducible umbilical hernia about 2 cm, diastasis recti, obese  : Deferred  Musculoskeletal: extremities normal- no gross deformities noted, gait normal, and normal muscle tone  Skin: no suspicious lesions or rashes  Psychiatric: mentation appears normal and affect normal/bright      Assessment:     ICD-10-CM    1. Umbilical hernia without obstruction and without gangrene  K42.9 Case Request: HERNIORRHAPHY, VENTRAL, ROBOT-ASSISTED      2. Diastasis recti  M62.08 Case Request: HERNIORRHAPHY, VENTRAL, ROBOT-ASSISTED      3. Chronic abdominal pain  R10.9     G89.29         Plan: While I still think the chance is low that repair of his recurrent umbilical hernia is going to help this chronic abdominal pain there is still a possibility and therefore he would like to go forward with repair.  Would plan to include repair of the diastases as well in case that is a factor.  I discussed that does make it a larger procedure so would tackle that robotically (etep).  Still uncertain for sure if there is previous mesh from his old repair in 2010.  If so would likely keep in place with the posterior layer as it would be more difficult  to try and remove if there are no other issues with it.  Risks of surgery discussed including, but not limited to bleeding, infection, recurrence, damage to intra-abdominal contents such as nerves, blood vessels, bowel or other organs.  Risks of anesthesia also discussed.  Although mesh is a better long term repair if it gets infected it must be removed. Mesh problems such as fracture, erosion or adhesions are possible.  If there is evidence of an infection at time of surgery it will be cancelled and rescheduled to when well.    Discussed massaging hernia back in and using ice if becomes more painful.  If not able to reduce then go to emergency room.      Dov Amos MD      Again, thank you for allowing me to participate in the care of your patient.        Sincerely,        Dov Amos MD

## 2024-11-25 NOTE — PATIENT INSTRUCTIONS
1. Blood tests today; Dr. Galan will call with results and what to do next  2. Referral for possible sleep study to exclude sleep apnea

## 2024-11-25 NOTE — TELEPHONE ENCOUNTER
Type of surgery: HERNIORRHAPHY, VENTRAL, ROBOT-ASSISTED   Location of surgery: [facility]  Date and time of surgery: 2/20  Surgeon: germain   Pre-Op Appt Date: pt. Will schedule himself   Post-Op Appt Date: 3/10   Packet sent out: Yes  Pre-cert/Authorization completed:  Not Applicable  Date:

## 2024-11-25 NOTE — LETTER
11/25/2024      Pop Eaton III  [address]  [facility] [address]      Dear Colleague,    Thank you for referring your patient, Pop Eaton III, to the [facility]. Please see a copy of my visit note below.    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    Impression:  Chronic neutrophilic leukocytosis and borderline erythrocytosis; could be due to chronic tobacco use and/or sleep apnea.  Want to rule out primary hematologic disorders which I think is unlikely.  Subacute recurrent unexplained emesis and left upper quadrant abdominal pain been evaluated by GI    Recommendation, plan, instructions:  Referral for sleep study  Hemochromatosis gene assay, JAK2 plus reflex, erythropoietin level, carbon monoxide level, CBC/differential, smear, BCR-ABL gene rearrangement, soluble transferrin receptor; I will call back with blood test results and what to do next.  The patient understands and agrees.    Time with patient including review, documentation, history, examination, coordination of care and counseling was 25 minutes.    History of present illness:  This patient has been seen in the [facility] because of hepatic steatosis for which fibrosis has been excluded and for which he is thought to have nonalcoholic fatty liver disease.    However, review of complete blood counts dating back to 2018 demonstrates a history of chronic, nonprogressive neutrophilic leukocytosis with recent hemoglobins over 17 and hematocrit over 50% with MCV's dropping down to 76.    October, 2024 ferritin was 170, iron 103, TIBC 324,  saturation 32%.  There was a negative March, 2024 CT abdomen pelvis except for hepatic steatosis without splenomegaly or renal masses.  Recent hepatic panel was unremarkable including liver transaminases.    The patient's past history is remarkable for biliary colic, apparent malignant melanoma, depression, cervical spinal stenosis, restless leg syndrome, PTSD and tobacco use.  Does not use alcohol.    Past medical history:  No past medical history on file.      Family history:  I have reviewed this patient's family history and updated it with pertinent information if needed.  Family History   Problem Relation Age of Onset     Prostate Cancer Maternal Uncle         X2     Liver Disease No family hx of      Liver Cancer No family hx of          Medications:  Current Outpatient Medications   Medication Sig Dispense Refill     hydrOXYzine HCl (ATARAX) 25 MG tablet TAKE 1 TABLET(25 MG) BY MOUTH THREE TIMES DAILY AS NEEDED FOR ITCHING OR NAUSEA OR VOMITING 60 tablet 1     metoclopramide (REGLAN) 5 MG tablet Take 1 tablet (5 mg) by mouth 3 times daily as needed (vomiting). 30 tablet 0     OLANZapine (ZYPREXA) 2.5 MG tablet TAKE 1 TABLET BY MOUTH AT BEDTIME ALONG WITH 5MG. MAY HAVE 1 ADDITIONAL AS NEEDED DAILY       OLANZapine (ZYPREXA) 5 MG tablet Take 5 mg by mouth at bedtime.       OLANZapine (ZYPREXA) 7.5 MG tablet Take 7.5 mg by mouth at bedtime.       traMADol (ULTRAM) 50 MG tablet Take 50 mg by mouth 2 times daily.       ondansetron (ZOFRAN ODT) 8 MG ODT tab Take 1 tablet (8 mg) by mouth every 8 hours as needed for nausea (Patient not taking: Reported on 11/25/2024) 20 tablet 1     No current facility-administered medications for this visit.       Allergies:  Allergies   Allergen Reactions     Wellbutrin [Bupropion]        Review of systems:  Snoring for the last 10 years according to patient's wife.  He smokes 5 cigarettes daily.    Except as note above, the patient denies:  Headache, double vision, change in  "vision, hearing loss, tinnitus;  Dyspnea, chest pain, palpitations, pleurisy or hemoptysis;  Anorexia, nausea, vomiting, diarrhea, constipation, rectal bleeding bleeding, change in bowel habits;  Urinary frequency, burning or hematuria;  Fever, chills, sweats, change in appetite;  Bone or back pain; skin rash, joint swelling, new lumps;  Unexplained, bleeding or bruising, tingling numbness, change in gait;    Physical examination:  /76   Pulse 70   Resp 18   Ht 1.727 m (5' 7.99\")   Wt 90.5 kg (199 lb 8 oz)   SpO2 93%   BMI 30.34 kg/m      The patient is alert and oriented.   Throat and oral mucosa are normal-appearing.   Adenopathy is absent in the neck, axilla, groin and supraclavicular fossa;   Lungs are clear to auscultation and percussion without rales, wheezes or rubs; Heart rhythm is regular, heart sounds are without murmurs or gallops;   Abdomen is soft and flat without hepatosplenomegaly, masses, ascites or tenderness;   Extremities and skin reveal no unusual skin lesions, joint swelling or edema;    Jamel Galan MD              Again, thank you for allowing me to participate in the care of your patient.        Sincerely,        Jamel Galan MD  "

## 2024-11-25 NOTE — PROGRESS NOTES
"Patient seen in follow-up for his hernia    HPI:  Patient is a 59 year old male  with complaints of the ongoing chronic left abdominal pain  Saw me in clinic earlier this month for this  Now \"grasping at straws\" for doing anything that might help even if low chance so wanted to talk again about hernia repair.  The patient noticed the symptoms about 1 year ago.    nothing makes the episode better.    From last visit:  Patient is a 59 year old male  with complaints of bulge in upper abdomen  Gets stomach pain and vomiting every morning  Not sure of cause. Did upper and lower scopes and no definite cause seen there  The patient noticed the symptoms about 3-4 years ago.    Had umbilical hernia repair when had lap aldo in 2010 at [facility]. Pain is more left abdomen into LUQ not periumbilical.   nothing makes the episode better.  Was about 240 lbs when started. Now down to 190. Losing the weight has not helped this pain.      No past medical history on file.  Patient Active Problem List   Diagnosis    History of colonic polyps, 2/2017    Family history of prostate cancer    Restless legs syndrome    Mild major depression (H)    SNHL (sensory-neural hearing loss), asymmetrical    Tinnitus, bilateral    Major depressive disorder, recurrent episode, moderate (H)    Melanoma of skin (H)    Subcoracoid bursitis of left shoulder    Scapular dyskinesis    Rotator cuff impingement syndrome of left shoulder    Patellofemoral syndrome, right    Left knee pain    Impingement syndrome, shoulder, right    Cervical spinal stenosis    Cervical pseudoarthrosis (H)    Biliary colic    Biceps tendinitis of left shoulder    Aftercare following surgery    Aftercare following surgery of the musculoskeletal system    Acute medial meniscus tear of right knee    Tobacco use disorder    Hepatic steatosis    Prediabetes         Past Surgical History:   Procedure Laterality Date    ARTHROSCOPY KNEE RT/LT Left 2013    meniscal tear     ARTHROSCOPY " KNEE RT/LT Right 10/2016    medial meniscal tear     COLONOSCOPY  02/2017    Q 3 years for polyps    COLONOSCOPY N/A 12/19/2023    Procedure: COLONOSCOPY, WITH POLYPECTOMY;  Surgeon: Gulshan Braden MD;  Location: [facility]    COLONOSCOPY WITH CO2 INSUFFLATION N/A 02/15/2017    Procedure: COLONOSCOPY WITH CO2 INSUFFLATION;  Surgeon: Galileo Lezama DO;  Location: [facility]    ESOPHAGOSCOPY, GASTROSCOPY, DUODENOSCOPY (EGD), COMBINED N/A 8/28/2024    Procedure: ESOPHAGOGASTRODUODENOSCOPY, WITH BIOPSY;  Surgeon: Estefani Diaz MD;  Location: [facility] OR    HERNIA REPAIR  2010    LAPAROSCOPIC APPENDECTOMY  2010    LAPAROSCOPIC CHOLECYSTECTOMY  2010    SURGICAL HISTORY OF -  Left 1995    corneal transplant X 2 on left eye       Social History     Socioeconomic History    Marital status:      Spouse name: Not on file    Number of children: Not on file    Years of education: Not on file    Highest education level: Not on file   Occupational History    Not on file   Tobacco Use    Smoking status: Light Smoker     Current packs/day: 0.04     Types: Cigarettes    Smokeless tobacco: Never    Tobacco comments:     5-8 cigs per day   Vaping Use    Vaping status: Never Used   Substance and Sexual Activity    Alcohol use: Yes     Comment: 1-2 drinks per year; no hx of alcohol abuse    Drug use: No     Comment: THC gummies for sleep (use for ~2 mo); no hx IVDU    Sexual activity: Yes     Partners: Female   Other Topics Concern    Parent/sibling w/ CABG, MI or angioplasty before 65F 55M? Not Asked   Social History Narrative    Not on file     Social Drivers of Health     Financial Resource Strain: Low Risk  (11/17/2023)    Financial Resource Strain     Within the past 12 months, have you or your family members you live with been unable to get utilities (heat, electricity) when it was really needed?: No   Food Insecurity: Low Risk  (11/17/2023)    Food Insecurity     Within the past 12 months, did you worry that your food would run  out before you got money to buy more?: No     Within the past 12 months, did the food you bought just not last and you didn t have money to get more?: No   Transportation Needs: Low Risk  (11/17/2023)    Transportation Needs     Within the past 12 months, has lack of transportation kept you from medical appointments, getting your medicines, non-medical meetings or appointments, work, or from getting things that you need?: No   Physical Activity: Not on file   Stress: Not on file   Social Connections: Unknown (12/27/2021)    Received from [facility]    Social Connections     Frequency of Communication with Friends and Family: Not on file   Interpersonal Safety: Low Risk  (8/28/2024)    Interpersonal Safety     Do you feel physically and emotionally safe where you currently live?: Yes     Within the past 12 months, have you been hit, slapped, kicked or otherwise physically hurt by someone?: No     Within the past 12 months, have you been humiliated or emotionally abused in other ways by your partner or ex-partner?: No   Housing Stability: Low Risk  (11/17/2023)    Housing Stability     Do you have housing? : Yes     Are you worried about losing your housing?: No       Current Outpatient Medications   Medication Sig Dispense Refill    hydrOXYzine HCl (ATARAX) 25 MG tablet TAKE 1 TABLET(25 MG) BY MOUTH THREE TIMES DAILY AS NEEDED FOR ITCHING OR NAUSEA OR VOMITING (Patient not taking: Reported on 11/4/2024) 60 tablet 1    metoclopramide (REGLAN) 5 MG tablet Take 1 tablet (5 mg) by mouth 3 times daily as needed (vomiting). (Patient not taking: Reported on 11/4/2024) 30 tablet 0    OLANZapine (ZYPREXA) 5 MG tablet Take 5 mg by mouth at bedtime.      ondansetron (ZOFRAN ODT) 8 MG ODT tab Take 1 tablet (8 mg) by mouth every 8 hours as needed for nausea 20 tablet 1    oxyCODONE-acetaminophen (PERCOCET) 5-325 MG tablet Take 1 tablet by mouth 2  times daily as needed for pain.      traMADol (ULTRAM) 50 MG tablet Take 50 mg by mouth 2 times daily (Patient not taking: Reported on 9/20/2024)         Medications and history reviewed    Physical exam:  Vitals: BP (!) 163/94   Pulse 73   Wt 89.4 kg (197 lb)   BMI 29.95 kg/m    BMI= Body mass index is 29.95 kg/m .    Constitutional: healthy, alert, and no distress  Head: Normocephalic. No masses, lesions, tenderness or abnormalities  Gastrointestinal: positive findings: small easily reducible umbilical hernia about 2 cm, diastasis recti, obese  : Deferred  Musculoskeletal: extremities normal- no gross deformities noted, gait normal, and normal muscle tone  Skin: no suspicious lesions or rashes  Psychiatric: mentation appears normal and affect normal/bright      Assessment:     ICD-10-CM    1. Umbilical hernia without obstruction and without gangrene  K42.9 Case Request: HERNIORRHAPHY, VENTRAL, ROBOT-ASSISTED      2. Diastasis recti  M62.08 Case Request: HERNIORRHAPHY, VENTRAL, ROBOT-ASSISTED      3. Chronic abdominal pain  R10.9     G89.29         Plan: While I still think the chance is low that repair of his recurrent umbilical hernia is going to help this chronic abdominal pain there is still a possibility and therefore he would like to go forward with repair.  Would plan to include repair of the diastases as well in case that is a factor.  I discussed that does make it a larger procedure so would tackle that robotically (etep).  Still uncertain for sure if there is previous mesh from his old repair in 2010.  If so would likely keep in place with the posterior layer as it would be more difficult to try and remove if there are no other issues with it.  Risks of surgery discussed including, but not limited to bleeding, infection, recurrence, damage to intra-abdominal contents such as nerves, blood vessels, bowel or other organs.  Risks of anesthesia also discussed.  Although mesh is a better long term repair  if it gets infected it must be removed. Mesh problems such as fracture, erosion or adhesions are possible.  If there is evidence of an infection at time of surgery it will be cancelled and rescheduled to when well.    Discussed massaging hernia back in and using ice if becomes more painful.  If not able to reduce then go to emergency room.      Dov Amos MD

## 2024-11-26 LAB
PATH REPORT.COMMENTS IMP SPEC: NORMAL
PATH REPORT.COMMENTS IMP SPEC: NORMAL
PATH REPORT.FINAL DX SPEC: NORMAL
PATH REPORT.MICROSCOPIC SPEC OTHER STN: NORMAL
PATH REPORT.MICROSCOPIC SPEC OTHER STN: NORMAL
PATH REPORT.RELEVANT HX SPEC: NORMAL

## 2024-11-27 LAB — EPO SERPL-ACNC: 10 MU/ML

## 2024-11-28 LAB — STFR SERPL-MCNC: 1.7 MG/L

## 2024-12-02 LAB
INTERPRETATION SERPL IEP-IMP: NORMAL
LAB TEST RESULTS REPORTED IN RPTPERIOD: NORMAL
LOCATION OF TASK: NORMAL
SEQUENCING METHOD PNL BLD/T: NORMAL
SPECIMEN TYPE: NORMAL

## 2024-12-03 ENCOUNTER — TRANSFERRED RECORDS (OUTPATIENT)
Dept: HEALTH INFORMATION MANAGEMENT | Facility: CLINIC | Age: 59
End: 2024-12-03
Payer: COMMERCIAL

## 2024-12-24 DIAGNOSIS — R11.2 NAUSEA AND VOMITING, UNSPECIFIED VOMITING TYPE: ICD-10-CM

## 2024-12-24 RX ORDER — HYDROXYZINE HYDROCHLORIDE 25 MG/1
TABLET, FILM COATED ORAL
Qty: 60 TABLET | Refills: 1 | Status: SHIPPED | OUTPATIENT
Start: 2024-12-24

## 2024-12-31 ENCOUNTER — TRANSFERRED RECORDS (OUTPATIENT)
Dept: HEALTH INFORMATION MANAGEMENT | Facility: CLINIC | Age: 59
End: 2024-12-31
Payer: COMMERCIAL

## 2025-01-23 ENCOUNTER — TRANSFERRED RECORDS (OUTPATIENT)
Dept: HEALTH INFORMATION MANAGEMENT | Facility: CLINIC | Age: 60
End: 2025-01-23
Payer: COMMERCIAL

## 2025-02-22 PROBLEM — M75.22 BICEPS TENDINITIS OF LEFT SHOULDER: Status: RESOLVED | Noted: 2021-06-06 | Resolved: 2025-02-22

## 2025-02-22 PROBLEM — K76.0 HEPATIC STEATOSIS: Chronic | Status: ACTIVE | Noted: 2024-04-17

## 2025-02-22 PROBLEM — F33.1 MAJOR DEPRESSIVE DISORDER, RECURRENT EPISODE, MODERATE (H): Chronic | Status: ACTIVE | Noted: 2023-05-22

## 2025-02-22 PROBLEM — D75.1 ERYTHROCYTOSIS: Chronic | Status: ACTIVE | Noted: 2025-02-22

## 2025-02-22 PROBLEM — D72.829 LEUKOCYTOSIS: Chronic | Status: ACTIVE | Noted: 2025-02-22

## 2025-02-24 ASSESSMENT — SLEEP AND FATIGUE QUESTIONNAIRES
HOW LIKELY ARE YOU TO NOD OFF OR FALL ASLEEP WHILE SITTING INACTIVE IN A PUBLIC PLACE: WOULD NEVER DOZE
HOW LIKELY ARE YOU TO NOD OFF OR FALL ASLEEP WHILE SITTING QUIETLY AFTER LUNCH WITHOUT ALCOHOL: MODERATE CHANCE OF DOZING
HOW LIKELY ARE YOU TO NOD OFF OR FALL ASLEEP WHILE SITTING AND READING: HIGH CHANCE OF DOZING
HOW LIKELY ARE YOU TO NOD OFF OR FALL ASLEEP WHEN YOU ARE A PASSENGER IN A CAR FOR AN HOUR WITHOUT A BREAK: SLIGHT CHANCE OF DOZING
HOW LIKELY ARE YOU TO NOD OFF OR FALL ASLEEP WHILE WATCHING TV: HIGH CHANCE OF DOZING
HOW LIKELY ARE YOU TO NOD OFF OR FALL ASLEEP WHILE LYING DOWN TO REST IN THE AFTERNOON WHEN CIRCUMSTANCES PERMIT: HIGH CHANCE OF DOZING
HOW LIKELY ARE YOU TO NOD OFF OR FALL ASLEEP IN A CAR, WHILE STOPPED FOR A FEW MINUTES IN TRAFFIC: WOULD NEVER DOZE
HOW LIKELY ARE YOU TO NOD OFF OR FALL ASLEEP WHILE SITTING AND TALKING TO SOMEONE: WOULD NEVER DOZE

## 2025-02-25 ENCOUNTER — VIRTUAL VISIT (OUTPATIENT)
Dept: SLEEP MEDICINE | Facility: CLINIC | Age: 60
End: 2025-02-25
Payer: COMMERCIAL

## 2025-02-25 VITALS — HEIGHT: 68 IN | WEIGHT: 199 LBS | BODY MASS INDEX: 30.16 KG/M2

## 2025-02-25 DIAGNOSIS — Z72.820 LACK OF ADEQUATE SLEEP: ICD-10-CM

## 2025-02-25 DIAGNOSIS — R06.00 DYSPNEA AND RESPIRATORY ABNORMALITY: Primary | ICD-10-CM

## 2025-02-25 DIAGNOSIS — G47.09 OTHER INSOMNIA: ICD-10-CM

## 2025-02-25 DIAGNOSIS — R06.83 SNORING: ICD-10-CM

## 2025-02-25 DIAGNOSIS — R06.89 DYSPNEA AND RESPIRATORY ABNORMALITY: Primary | ICD-10-CM

## 2025-02-25 DIAGNOSIS — E66.811 CLASS 1 OBESITY DUE TO EXCESS CALORIES WITH SERIOUS COMORBIDITY AND BODY MASS INDEX (BMI) OF 30.0 TO 30.9 IN ADULT: ICD-10-CM

## 2025-02-25 DIAGNOSIS — D75.1 ERYTHROCYTOSIS: ICD-10-CM

## 2025-02-25 DIAGNOSIS — E66.09 CLASS 1 OBESITY DUE TO EXCESS CALORIES WITH SERIOUS COMORBIDITY AND BODY MASS INDEX (BMI) OF 30.0 TO 30.9 IN ADULT: ICD-10-CM

## 2025-02-25 DIAGNOSIS — R53.81 MALAISE AND FATIGUE: ICD-10-CM

## 2025-02-25 DIAGNOSIS — R53.83 MALAISE AND FATIGUE: ICD-10-CM

## 2025-02-25 PROBLEM — F17.200 TOBACCO USE DISORDER: Chronic | Status: ACTIVE | Noted: 2024-04-17

## 2025-02-25 PROBLEM — G25.81 RESTLESS LEGS SYNDROME: Status: RESOLVED | Noted: 2018-08-07 | Resolved: 2025-02-25

## 2025-02-25 PROCEDURE — 98002 SYNCH AUDIO-VIDEO NEW MOD 45: CPT | Performed by: PHYSICIAN ASSISTANT

## 2025-02-25 ASSESSMENT — PAIN SCALES - GENERAL: PAINLEVEL_OUTOF10: SEVERE PAIN (7)

## 2025-02-25 NOTE — PROGRESS NOTES
Virtual Visit Details    Type of service:  Video Visit     Originating Location (pt. Location): Home    Distant Location (provider location):  On-site  Platform used for Video Visit: [facility]    Outpatient Sleep Medicine Consultation:      Name: Pop Eaton III MRN# [id number]   Age: 59 year old YOB: 1965     Date of Consultation: February 25, 2025  Consultation is requested by: Jamel Galan MD  [address] [place] [address] Jamel Galan  Primary care provider: Fela Babin       Reason for Sleep Consult:     Pop Eaton III is sent by Jamel Galan for a sleep consultation regarding snoring and erythrocytosis.    Patient s Reason for visit  Pop Eaton III main reason for visit: (Patient-Rptd) For a good nights sleep  Patient states problem(s) started: (Patient-Rptd) Years ago  Pop Eaton III's goals for this visit: (Patient-Rptd) To get a good nights sleep           Assessment and Plan:     Pop Eaton III is a 59-year-old male with a history of chronic nonprogressive neutrophilic leukocytosis, borderline erythrocytosis with microcytosis, and chronic tobacco use who presents for evaluation of snoring, low-normal oxygen saturation (93%) and erythrocytosis. He experiences nonrestorative sleep, excessive daytime sleepiness (ESS 12) and frequent nocturnal awakenings. Given his erythrocytosis, there is concern for nocturnal hypoxemia contributing to secondary polycythemia.  --Recommend polysomnography with transcutaneous CO? monitoring and venous blood gas to evaluate for sleep-disordered breathing, nocturnal hypoxemia, and potential hypoventilation.Ambien if needed.   Consider PFTs      Insomnia, sleep onset and sleep maintenance, likely due to a variety of factors including inadequate sleep hygiene and conditioned hyperarousal. Co-occurring depression identified and insomnia might be a secondary symptom. Untreated obstructive sleep apnea may  be implicated in sleep maintenance difficulties. Discussed sleep hygiene, stimulus control and sleep compression to help better consolidate sleep.     Recommend weight management.  We discussed the link between obesity, sleep apnea, and health outcomes. Weight loss is recommended to address long term effects of obesity and sleep apnea.         Summary Recommendations:  No orders of the defined types were placed in this encounter.      Summary Counseling:    Sleep Testing Reviewed  Obstructive Sleep Apnea Reviewed  Complications of Untreated Sleep Apnea Reviewed      Medical Decision-making:   Educational materials provided in instructions    Total time spent reviewing medical records, history and physical examination, review of previous testing and interpretation as well as documentation on this date:50 minutes    CC: Jamel Galan          History of Present Illness:     Pop Eaton III is a 59-year-old male with a history of chronic nonprogressive neutrophilic leukocytosis, borderline erythrocytosis with microcytosis, and chronic tobacco use who presents for evaluation of snoring, low-normal oxygen saturation (93%), and erythrocytosis. He reports a long-standing history of loud snoring, noted by his bed partner, there is no witnessed apneas. He experiences nonrestorative sleep, excessive daytime sleepiness (ESS 12) and frequent nocturnal awakenings. Given his erythrocytosis, there is concern for nocturnal hypoxemia contributing to secondary polycythemia.    SLEEP-WAKE SCHEDULE:     Work/School Days: Patient goes to school/work: (Patient-Rptd) Yes   Usually gets into bed at (Patient-Rptd) 9p  Takes patient about (Patient-Rptd) 1hr to fall asleep  Has trouble falling asleep (Patient-Rptd) 4 nights per week  Wakes up in the middle of the night (Patient-Rptd) 4 times.  Wakes up due to (Patient-Rptd) Use the bathroom;Uncertain  He has trouble falling back asleep (Patient-Rptd) 3 times a week.   It usually takes  (Patient-Rptd) 1hr to get back to sleep  Patient is usually up at (Patient-Rptd) 7 or 8am  Uses alarm: (Patient-Rptd) No    Weekends/Non-work Days/All Other Days:  Usually gets into bed at (Patient-Rptd) 9pm   Takes patient about (Patient-Rptd) 1hr to fall asleep  Patient is usually up at (Patient-Rptd) 8  Uses alarm: (Patient-Rptd) No    Sleep Need  Patient gets  (Patient-Rptd) 5hrs sleep on average   Patient thinks he needs about (Patient-Rptd) 8 sleep    Pop Eaton III prefers to sleep in this position(s): (Patient-Rptd) Stomach   Patient states they do the following activities in bed: (Patient-Rptd) Watch TV    Naps  Patient takes a purposeful nap (Patient-Rptd) 1 times a week and naps are usually (Patient-Rptd) 2 or 3 hrs in duration  He feels better after a nap: (Patient-Rptd) No  He dozes off unintentionally (Patient-Rptd) 2 days per week  Patient has had a driving accident or near-miss due to sleepiness/drowsiness: (Patient-Rptd) No      SLEEP DISRUPTIONS:    Breathing/Snoring  Patient snores:(Patient-Rptd) Yes  Other people complain about his snoring: (Patient-Rptd) Yes  Patient has been told he stops breathing in his sleep:(Patient-Rptd) No  He has issues with the following: (Patient-Rptd) Getting up to urinate more than once    Movement:  Patient gets pain, discomfort, with an urge to move:  (Patient-Rptd) No  It happens when he is resting:  (Patient-Rptd) No  It happens more at night:  (Patient-Rptd) No  Patient has been told he kicks his legs at night:  (Patient-Rptd) Yes     Behaviors in Sleep:  Pop Eaton III has experienced the following behaviors while sleeping: (Patient-Rptd) Recurring Nightmares;Eating;Night terrors (screaming,yelling or acting afraid but not recalling event)  He has experienced sudden muscle weakness during the day: (Patient-Rptd) No      Is there anything else you would like your sleep provider to know: (Patient-Rptd) No      CAFFEINE AND OTHER  SUBSTANCES:    Patient consumes caffeinated beverages per day:  (Patient-Rptd) 4 cup coffee  Last caffeine use is usually: (Patient-Rptd) 11am  List of any prescribed or over the counter stimulants that patient takes:    List of any prescribed or over the counter sleep medication patient takes: (Patient-Rptd) None  List of previous sleep medications that patient has tried: (Patient-Rptd) None  Patient drinks alcohol to help them sleep: (Patient-Rptd) No  Patient drinks alcohol near bedtime: (Patient-Rptd) No    Family History:  Patient has a family member been diagnosed with a sleep disorder: (Patient-Rptd) No            SCALES:    EPWORTH SLEEPINESS SCALE         2/24/2025     9:28 AM    Kent Sleepiness Scale ( BRYON Plata  [id number]<br>ESS - [place]/English - Final version - 21 Nov 07 - [facility] [place].)   Sitting and reading High chance of dozing   Watching TV High chance of dozing   Sitting, inactive in a public place (e.g. a theatre or a meeting) Would never doze   As a passenger in a car for an hour without a break Slight chance of dozing   Lying down to rest in the afternoon when circumstances permit High chance of dozing   Sitting and talking to someone Would never doze   Sitting quietly after a lunch without alcohol Moderate chance of dozing   In a car, while stopped for a few minutes in traffic Would never doze   Kent Score (MC) 12   Kent Score (Sleep) 12        Patient-reported         INSOMNIA SEVERITY INDEX (KATYA)          2/24/2025     9:14 AM   Insomnia Severity Index (KATYA)   Difficulty falling asleep 3   Difficulty staying asleep 3   Problems waking up too early 0   How SATISFIED/DISSATISFIED are you with your CURRENT sleep pattern? 3   How NOTICEABLE to others do you think your sleep problem is in terms of impairing the quality of your life? 1   How WORRIED/DISTRESSED are you about your current sleep problem? 1   To what extent do you consider your sleep problem to INTERFERE with your  "daily functioning (e.g. daytime fatigue, mood, ability to function at work/daily chores, concentration, memory, mood, etc.) CURRENTLY? 2   KATYA Total Score 13        Patient-reported       Guidelines for Scoring/Interpretation:  Total score categories:  0-7 = No clinically significant insomnia   8-14 = Subthreshold insomnia   15-21 = Clinical insomnia (moderate severity)  22-28 = Clinical insomnia (severe)  Used via courtesy of www.[employer].va.gov with permission from Martínez Delaney PhD., Joint venture between [facility]      STOP BANG         2/25/2025     9:14 AM   STOP BANG Questionnaire (  2008, the American Society of Anesthesiologists, Inc. [employer]   B/P Clinic: --   BMI Clinic: 30.27         GAD7        7/13/2020    12:38 PM   COMPA-7    1. Feeling nervous, anxious, or on edge 2   2. Not being able to stop or control worrying 3   3. Worrying too much about different things 3   4. Trouble relaxing 2   5. Being so restless that it is hard to sit still 2   6. Becoming easily annoyed or irritable 3   7. Feeling afraid, as if something awful might happen 2   COMPA-7 Total Score 17   If you checked any problems, how difficult have they made it for you to do your work, take care of things at home, or get along with other people? Very difficult         CAGE-AID        6/22/2020    11:00 AM   CAGE-AID Flowsheet   Have you ever felt you should Cut down on your drinking or drug use? 0   Have people Annoyed you by criticizing your drinking or drug use? 0   Have you ever felt bad or Guilty about your drinking or drug use? 0   Have you ever had a drink or used drugs first thing in the morning to steady your nerves or to get rid of a hangover? (Eye opener) 0   CAGE-AID SCORE 0       CAGE-AID reprinted with permission from the [place] Medical Journal, IRENE Damon. and OLIVE Burger, \"Conjoint screening questionnaires for alcohol and drug abuse\" [place] Medical Journal 94: 135-140, 1995.      PATIENT HEALTH " QUESTIONNAIRE-9 (PHQ - 9)        9/20/2024     9:14 AM   PHQ-9 ([employer])   1.  Little interest or pleasure in doing things 3   2.  Feeling down, depressed, or hopeless 3   3.  Trouble falling or staying asleep, or sleeping too much 2   4.  Feeling tired or having little energy 2   5.  Poor appetite or overeating 3   6.  Feeling bad about yourself - or that you are a failure or have let yourself or your family down 3   7.  Trouble concentrating on things, such as reading the newspaper or watching television 2   8.  Moving or speaking so slowly that other people could have noticed. Or the opposite - being so fidgety or restless that you have been moving around a lot more than usual 3   9.  Thoughts that you would be better off dead, or of hurting yourself in some way 0   PHQ-9 Total Score 21   6.  Feeling bad about yourself 3   7.  Trouble concentrating 2   8.  Moving slowly or restless 3   9.  Suicidal or self-harm thoughts 0   1.  Little interest or pleasure in doing things Nearly every day   2.  Feeling down, depressed, or hopeless Nearly every day   3.  Trouble falling or staying asleep, or sleeping too much More than half the days   4.  Feeling tired or having little energy More than half the days   5.  Poor appetite or overeating Nearly every day   6.  Feeling bad about yourself Nearly every day   7.  Trouble concentrating More than half the days   8.  Moving slowly or restless Nearly every day   9.  Suicidal or self-harm thoughts Not at all   PHQ-9 via [facility] TOTAL SCORE-----> 21 (Severe depression)   Difficulty at work, home, or with people Very difficult       Developed by Demetria Vega, Sandy Patel, Lloyd Malone and colleagues, with an educational evie from [employer]. No permission required to reproduce, translate, display or distribute.        Allergies:    Allergies   Allergen Reactions    Wellbutrin [Bupropion]        Medications:    Current Outpatient Medications   Medication Sig  Dispense Refill    hydrOXYzine HCl (ATARAX) 25 MG tablet TAKE 1 TABLET(25 MG) BY MOUTH THREE TIMES DAILY AS NEEDED FOR ITCHING OR NAUSEA OR VOMITING 60 tablet 1    metoclopramide (REGLAN) 5 MG tablet Take 1 tablet (5 mg) by mouth 3 times daily as needed (vomiting). 30 tablet 0    OLANZapine (ZYPREXA) 2.5 MG tablet TAKE 1 TABLET BY MOUTH AT BEDTIME ALONG WITH 5MG. MAY HAVE 1 ADDITIONAL AS NEEDED DAILY      OLANZapine (ZYPREXA) 5 MG tablet Take 5 mg by mouth at bedtime.      OLANZapine (ZYPREXA) 7.5 MG tablet Take 7.5 mg by mouth at bedtime.      ondansetron (ZOFRAN ODT) 8 MG ODT tab Take 1 tablet (8 mg) by mouth every 8 hours as needed for nausea (Patient not taking: Reported on 11/25/2024) 20 tablet 1    traMADol (ULTRAM) 50 MG tablet Take 50 mg by mouth 2 times daily.         Problem List:  Patient Active Problem List    Diagnosis Date Noted    Leukocytosis 02/22/2025     Priority: Medium    Erythrocytosis 02/22/2025     Priority: Medium    Umbilical hernia without obstruction and without gangrene 11/25/2024     Priority: Medium    Diastasis recti 11/25/2024     Priority: Medium    Tobacco use disorder 04/17/2024     Priority: Medium    Hepatic steatosis 04/17/2024     Priority: Medium    Prediabetes 04/17/2024     Priority: Medium    Major depressive disorder, recurrent episode, moderate (H) 05/22/2023     Priority: Medium    Melanoma of skin (H) 05/22/2023     Priority: Medium    Subcoracoid bursitis of left shoulder 06/06/2021     Priority: Medium    Rotator cuff impingement syndrome of left shoulder 06/01/2021     Priority: Medium    SNHL (sensory-neural hearing loss), asymmetrical 03/22/2021     Priority: Medium    Tinnitus, bilateral 03/22/2021     Priority: Medium    Cervical pseudoarthrosis (H) 06/26/2019     Priority: Medium     Treated surgically with posterior fusion C5-7 on 6/26/2019 by Dr. Caro.      Cervical spinal stenosis 02/27/2019     Priority: Medium     Treated surgically with ACDF C5-7 on  2/27/2019 by Dr. Caro.      Scapular dyskinesis 11/13/2018     Priority: Medium    Impingement syndrome, shoulder, right 11/13/2018     Priority: Medium    Family history of prostate cancer 08/07/2018     Priority: Medium    Restless legs syndrome 08/07/2018     Priority: Medium    History of colonic polyps, 2/2017 02/15/2017     Priority: Medium    Patellofemoral syndrome, right 11/29/2016     Priority: Medium    Left knee pain 06/05/2013     Priority: Medium    Biliary colic 12/21/2011     Priority: Medium        Past Medical/Surgical History:  Past Medical History:   Diagnosis Date    Acute medial meniscus tear of right knee 10/14/2016    Biceps tendinitis of left shoulder 06/06/2021     Past Surgical History:   Procedure Laterality Date    ARTHROSCOPY KNEE RT/LT Left 2013    meniscal tear     ARTHROSCOPY KNEE RT/LT Right 10/2016    medial meniscal tear     COLONOSCOPY  02/2017    Q 3 years for polyps    COLONOSCOPY N/A 12/19/2023    Procedure: COLONOSCOPY, WITH POLYPECTOMY;  Surgeon: Gulshan Braden MD;  Location: [facility]    COLONOSCOPY WITH CO2 INSUFFLATION N/A 02/15/2017    Procedure: COLONOSCOPY WITH CO2 INSUFFLATION;  Surgeon: Galileo Lezama DO;  Location: [facility]    ESOPHAGOSCOPY, GASTROSCOPY, DUODENOSCOPY (EGD), COMBINED N/A 8/28/2024    Procedure: ESOPHAGOGASTRODUODENOSCOPY, WITH BIOPSY;  Surgeon: Estefani Diaz MD;  Location: [facility] OR    HERNIA REPAIR  2010    LAPAROSCOPIC APPENDECTOMY  2010    LAPAROSCOPIC CHOLECYSTECTOMY  2010    SURGICAL HISTORY OF -  Left 1995    corneal transplant X 2 on left eye       Social History:  Social History     Socioeconomic History    Marital status:      Spouse name: Not on file    Number of children: Not on file    Years of education: Not on file    Highest education level: Not on file   Occupational History    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   Tobacco Use    Smoking status: Light Smoker     Current packs/day: 0.04     Types: Cigarettes    Smokeless  tobacco: Never    Tobacco comments:     5-8 cigs per day   Vaping Use    Vaping status: Never Used   Substance and Sexual Activity    Alcohol use: Yes     Comment: 1-2 drinks per year; no hx of alcohol abuse    Drug use: No     Comment: THC gummies for sleep (use for ~2 mo); no hx IVDU    Sexual activity: Yes     Partners: Female   Other Topics Concern    Parent/sibling w/ CABG, MI or angioplasty before 65F 55M? Not Asked   Social History Narrative    Not on file     Social Drivers of Health     Financial Resource Strain: Low Risk  (11/17/2023)    Financial Resource Strain     Within the past 12 months, have you or your family members you live with been unable to get utilities (heat, electricity) when it was really needed?: No   Food Insecurity: Low Risk  (11/17/2023)    Food Insecurity     Within the past 12 months, did you worry that your food would run out before you got money to buy more?: No     Within the past 12 months, did the food you bought just not last and you didn t have money to get more?: No   Transportation Needs: Low Risk  (11/17/2023)    Transportation Needs     Within the past 12 months, has lack of transportation kept you from medical appointments, getting your medicines, non-medical meetings or appointments, work, or from getting things that you need?: No   Physical Activity: Not on file   Stress: Not on file   Social Connections: Unknown (12/27/2021)    Received from [facility]    Social Connections     Frequency of Communication with Friends and Family: Not on file   Interpersonal Safety: Low Risk  (8/28/2024)    Interpersonal Safety     Do you feel physically and emotionally safe where you currently live?: Yes     Within the past 12 months, have you been hit, slapped, kicked or otherwise physically hurt by someone?: No     Within the past 12 months, have you been humiliated or emotionally abused in other ways by  "your partner or ex-partner?: No   Housing Stability: Low Risk  (11/17/2023)    Housing Stability     Do you have housing? : Yes     Are you worried about losing your housing?: No       Family History:  Family History   Problem Relation Age of Onset    Prostate Cancer Maternal Uncle         X2    Liver Disease No family hx of     Liver Cancer No family hx of        Review of Systems:  A complete review of systems reviewed by me is negative with the exeption of what has been mentioned in the history of present illness.  In the last TWO WEEKS have you experienced any of the following symptoms?  Fevers: (Patient-Rptd) No  Night Sweats: (Patient-Rptd) Yes  Weight Gain: (Patient-Rptd) No  Pain at Night: (Patient-Rptd) No  Double Vision: (Patient-Rptd) No  Changes in Vision: (Patient-Rptd) No  Difficulty Breathing through Nose: (Patient-Rptd) No  Sore Throat in Morning: (Patient-Rptd) No  Dry Mouth in the Morning: (Patient-Rptd) No  Shortness of Breath Lying Flat: (Patient-Rptd) No  Shortness of Breath With Activity: (Patient-Rptd) No  Awakening with Shortness of Breath: (Patient-Rptd) No  Increased Cough: (Patient-Rptd) No  Heart Racing at Night: (Patient-Rptd) No  Swelling in Feet or Legs: (Patient-Rptd) No  Diarrhea at Night: (Patient-Rptd) No  Heartburn at Night: (Patient-Rptd) No  Urinating More than Once at Night: (Patient-Rptd) Yes  Losing Control of Urine at Night: (Patient-Rptd) No  Joint Pains at Night: (Patient-Rptd) No  Headaches in Morning: (Patient-Rptd) No  Weakness in Arms or Legs: (Patient-Rptd) No  Depressed Mood: (Patient-Rptd) Yes  Anxiety: (Patient-Rptd) Yes     Physical Examination:  Vitals: Ht 1.727 m (5' 7.99\")   Wt 90.3 kg (199 lb)   BMI 30.27 kg/m    BMI= Body mass index is 30.27 kg/m .           GENERAL APPEARANCE: alert and no distress  EYES: Eyes grossly normal to inspection  NECK: no asymmetry, masses, or scars  RESP: breathing is non-labored   NEURO: mentation intact and speech " "normal  PSYCH: affect normal/bright  Mallampati Class:   Tonsillar Stage:          Data: All pertinent previous laboratory data reviewed     Recent Labs   Lab Test 10/08/24  1009 04/17/24  1333    142   POTASSIUM 4.1 3.8   CHLORIDE 105 105   CO2 25 26   ANIONGAP 11 11   * 94   BUN 11.9 7.1   CR 1.05 1.04   JANE 9.6 9.7       Recent Labs   Lab Test 11/25/24  1553   WBC 14.7*   RBC 6.00*   HGB 15.7   HCT 46.0   MCV 77*   MCH 26.2*   MCHC 34.1   RDW 15.9*          Recent Labs   Lab Test 10/08/24  1009   PROTTOTAL 7.3   ALBUMIN 4.6   BILITOTAL 0.5   ALKPHOS 103   AST 40   ALT 46       TSH   Date Value   04/17/2024 1.91 uIU/mL   07/30/2018 1.67 mU/L       No results found for: \"UAMP\", \"UBARB\", \"BENZODIAZEUR\", \"UCANN\", \"UCOC\", \"OPIT\", \"UPCP\"    Iron Sat Index   Date/Time Value Ref Range Status   04/17/2024 01:33 PM 32 15 - 46 % Final     Ferritin   Date/Time Value Ref Range Status   04/17/2024 01:33  31 - 409 ng/mL Final           LETITIA Forte 2/25/2025           "

## 2025-02-25 NOTE — PATIENT INSTRUCTIONS
"          MY TREATMENT INFORMATION FOR SLEEP APNEA-  Pop Eaton III    DOCTOR : LETITIA Forte    Am I having a sleep study at a sleep center?  --->Due to normal delays, you will be contacted within 2-4 weeks to schedule    Am I having a home sleep study?  --->Watch the video for the device you are using:    -/drop off device-   https://www.Financial Fairy Talesube.com/watch?v=yGGFBdELGhk    -Disposable device sent out require phone/computer application-   https://www.Miroi.com/watch?v=BCce_vbiwxE      Frequently asked questions:  1. What is Obstructive Sleep Apnea (SEBASTIÁN)? SEBASTIÁN is the most common type of sleep apnea. Apnea means, \"without breath.\"  Apnea is most often caused by narrowing or collapse of the upper airway as muscles relax during sleep.   Almost everyone has occasional apneas. Most people with sleep apnea have had brief interruptions at night frequently for many years.  The severity of sleep apnea is related to how frequent and severe the events are.   2. What are the consequences of SEBASTIÁN? Symptoms include: feeling sleepy during the day, snoring loudly, gasping or stopping of breathing, trouble sleeping, and occasionally morning headaches or heartburn at night.  Sleepiness can be serious and even increase the risk of falling asleep while driving. Other health consequences may include development of high blood pressure and other cardiovascular disease in persons who are susceptible. Untreated SEBASTIÁN  can contribute to heart disease, stroke and diabetes.   3. What are the treatment options? In most situations, sleep apnea is a lifelong disease that must be managed with daily therapy. Medications are not effective for sleep apnea and surgery is generally not considered until other therapies have been tried. Your treatment is your choice . Continuous Positive Airway (CPAP) works right away and is the therapy that is effective in nearly everyone. An oral device to hold your jaw forward is usually the next " most reliable option. Other options include postioning devices (to keep you off your back), weight loss, and surgery including a tongue pacing device. There is more detail about some of these options below.  4. Are my sleep studies covered by insurance? Although we will request verification of coverage, we advise you also check in advance of the study to ensure there is coverage.    Important tips for those choosing CPAP and similar devices  REMEMBER-IF YOU RECEIVE A CALL FROM  [phone]-->IT IS TO SETUP A DEVICE  For new devices, sign up for device KENZIE to monitor your device for your followup visits  We encourage you to utilize the [employer] kenzie or website ( https://GigaLogix.Thumbs Up/ ) to monitor your therapy progress and share the data with your healthcare team when you discuss your sleep apnea.                                                    Know your equipment:  CPAP is continuous positive airway pressure that prevents obstructive sleep apnea by keeping the throat from collapsing while you are sleeping. In most cases, the device is  smart  and can slowly self-adjusts if your throat collapses and keeps a record every day of how well you are treated-this information is available to you and your care team.  BPAP is bilevel positive airway pressure that keeps your throat open and also assists each breath with a pressure boost to maintain adequate breathing.  Special kinds of BPAP are used in patients who have inadequate breathing from lung or heart disease. In most cases, the device is  smart  and can slowly self-adjusts to assist breathing. Like CPAP, the device keeps a record of how well you are treated.  Your mask is your connection to the device. You get to choose what feels most comfortable and the staff will help to make sure if fits. Here: are some examples of the different masks that are available: Magnetic mask aids may assist with use but there are safety issues that should be addressed when  considering with magnets* ( see end of discussion).       Key points to remember on your journey with sleep apnea:  Sleep study.  PAP devices often need to be adjusted during a sleep study to show that they are effective and adjusted right.  Good tips to remember: Try wearing just the mask during a quiet time during the day so your body adapts to wearing it. A humidifier is recommended for comfort in most cases to prevent drying of your nose and throat. Allergy medication from your provider may help you if you are having nasal congestion.  Getting settled-in. It takes more than one night for most of us to get used to wearing a mask. Try wearing just the mask during a quiet time during the day so your body adapts to wearing it. A humidifier is recommended for comfort in most cases. Our team will work with you carefully on the first day and will be in contact within 4 days and again at 2 and 4 weeks for advice and remote device adjustments. Your therapy is evaluated by the device each day.   Use it every night. The more you are able to sleep naturally for 7-8 hours, the more likely you will have good sleep and to prevent health risks or symptoms from sleep apnea. Even if you use it 4 hours it helps. Occasionally all of us are unable to use a medical therapy, in sleep apnea, it is not dangerous to miss one night.   Communicate. Call our skilled team on the number provided on the first day if your visit for problems that make it difficult to wear the device. Over 2 out of 3 patients can learn to wear the device long-term with help from our team. Remember to call our team or your sleep providers if you are unable to wear the device as we may have other solutions for those who cannot adapt to mask CPAP therapy. It is recommended that you sleep your sleep provider within the first 3 months and yearly after that if you are not having problems.   Use it for your health. We encourage use of CPAP masks during daytime quiet  periods to allow your face and brain to adapt to the sensation of CPAP so that it will be a more natural sensation to awaken to at night or during naps. This can be very useful during the first few weeks or months of adapting to CPAP though it does not help medically to wear CPAP during wakefulness and  should not be used as a strategy just to meet guidelines.  Take care of your equipment. Make sure you clean your mask and tubing using directions every day and that your filter and mask are replaced as recommended or if they are not working.     *Masks with magnets:  Updated Contraindications  Masks with magnetic components are contraindicated for use by patients where they, or anyone in close physical contact while using the mask, have the following:   Active medical implants that interact with magnets (i.e., pacemakers, implantable cardioverter defibrillators (ICD), neurostimulators, cerebrospinal fluid (CSF) shunts, insulin/infusion pumps)   Metallic implants/objects containing ferromagnetic material (i.e., aneurysm clips/flow disruption devices, embolic coils, stents, valves, electrodes, implants to restore hearing or balance with implanted magnets, ocular implants, metallic splinters in the eye)  Updated Warning  Keep the mask magnets at a safe distance of at least 6 inches (150 mm) away from implants or medical devices that may be adversely affected by magnetic interference. This warning applies to you or anyone in close physical contact with your mask. The magnets are in the frame and lower headgear clips, with a magnetic field strength of up to 400mT. When worn, they connect to secure the mask but may inadvertently detach while asleep.  Implants/medical devices, including those listed within contraindications, may be adversely affected if they change function under external magnetic fields or contain ferromagnetic materials that attract/repel to magnetic fields (some metallic implants, e.g., contact lenses  with metal, dental implants, metallic cranial plates, screws, carla hole covers, and bone substitute devices). Consult your physician and  of your implant / other medical device for information on the potential adverse effects of magnetic fields.    BESIDES CPAP, WHAT OTHER THERAPIES ARE THERE?    Positioning Device  Positioning devices are generally used when sleep apnea is mild and only occurs on your back.This example shows a pillow that straps around the waist. It may be appropriate for those whose sleep study shows milder sleep apnea that occurs primarily when lying flat on one's back. Preliminary studies have shown benefit but effectiveness at home may need to be verified by a home sleep test. These devices are generally not covered by medical insurance.  Examples of devices that maintain sleeping on the back to prevent snoring and mild sleep apnea.    Belt type body positioner  http://Tracsis/    Electronic reminder  http://nightshifttherapy.Armut/            Oral Appliance  What is oral appliance therapy?  An oral appliance device fits on your teeth at night like a retainer used after having braces. The device is made by a specialized dentist and requires several visits over 1-2 months before a manufactured device is made to fit your teeth and is adjusted to prevent your sleep apnea. Once an oral device is working properly, snoring should be improved. A home sleep test may be recommended at that time if to determine whether the sleep apnea is adequately treated.       Some things to remember:  -Oral devices are often, but not always, covered by your medical insurance. Be sure to check with your insurance provider.   -If you are referred for oral therapy, you will be given a list of specialized dentists to consider or you may choose to visit the Web site of the American Academy of Dental Sleep Medicine  -Oral devices are less likely to work if you have severe sleep apnea or are extremely  overweight.     More detailed information  An oral appliance is a small acrylic device that fits over the upper and lower teeth  (similar to a retainer or a mouth guard). This device slightly moves jaw forward, which moves the base of the tongue forward, opens the airway, improves breathing for effective treat snoring and obstructive sleep apnea in perhaps 7 out of 10 people .  The best working devices are custom-made by a dental device  after a mold is made of the teeth 1, 2, 3.  When is an oral appliance indicated?  Oral appliance therapy is recommended as a first-line treatment for patients with primary snoring, mild sleep apnea, and for patients with moderate sleep apnea who prefer appliance therapy to use of CPAP4, 5. Severity of sleep apnea is determined by sleep testing and is based on the number of respiratory events per hour of sleep.   How successful is oral appliance therapy?  The success rate of oral appliance therapy in patients with mild sleep apnea is 75-80% while in patients with moderate sleep apnea it is 50-70%. The chance of success in patients with severe sleep apnea is 40-50%. The research also shows that oral appliances have a beneficial effect on the cardiovascular health of SEBASTIÁN patients at the same magnitude as CPAP therapy7.  Oral appliances should be a second-line treatment in cases of severe sleep apnea, but if not completely successful then a combination therapy utilizing CPAP plus oral appliance therapy may be effective. Oral appliances tend to be effective in a broad range of patients although studies show that the patients who have the highest success are females, younger patients, those with milder disease, and less severe obesity. 3, 6.   Finding a dentist that practices dental sleep medicine  Specific training is available through the American Academy of Dental Sleep Medicine for dentists interested in working in the field of sleep. To find a dentist who is educated in  the field of sleep and the use of oral appliances, near you, visit the Web site of the American Academy of Dental Sleep Medicine.    References  1. Sheridan, et al. Objectively measured vs self-reported compliance during oral appliance therapy for sleep-disordered breathing. Chest 2013; 144(5): 9531-2229.  2. Colby et al. Objective measurement of compliance during oral appliance therapy for sleep-disordered breathing. Thorax 2013; 68(1): 91-96.  3. Soy et al. Mandibular advancement devices in 620 men and women with SEBASTIÁN and snoring: tolerability and predictors of treatment success. Chest 2004; 125: 4911-4072.  4. Tim, et al. Oral appliances for snoring and SEBASTIÁN: a review. Sleep 2006; 29: 244-262.  5. Claudette et al. Oral appliance treatment for SEBASTIÁN: an update. J Clin Sleep Med 2014; 10(2): 215-227.  6. Dom et al. Predictors of OSAH treatment outcome. J Dent Res 2007; 86: 9946-2715.      Weight Loss:   Your Body mass index is 30.27 kg/m .    Being overweight does not necessarily mean you will have health consequences.  Those who have BMI over 30 or over 27 with existing medical conditions carries greater risk.   Weight loss decreases severity of sleep apnea in most people with obesity. For those with mild obesity who have developed snoring with weight gain, even 15-30 pound weight loss can improve and occasionally milder eliminate sleep apnea.  Structured and life-long dietary and health habits are necessary to lose weight and keep healthier weight levels.     The Comprehensive Weight loss program offers all aspects of weight loss strategies including two Non-Surgical Weight Loss Programs: Medical Weight Management and our 24 Week Healthy Lifestyle Program:  Medical Weight Management: You will meet with a Medical Weight Management Provider, as well as a Registered Dietician. The program may include medication therapy, dietary education, recommended exercise and physical therapy programs,  monthly support group meetings, and possible psychological counseling. Follow up visits with the provider or dietician are scheduled based on your progress and needs.  24 Week Healthy Lifestyle Program: This unique program is designed to give you the support of weekly appointments and activities thru a 24-week period. It may include all of the components of the basic program (above), with the addition of 11 individual Health  Visits, 24-week access to the [employer] website for over 700 online classes, and monthly support group meetings. This program has an out-of-pocket expense of $499 to cover the items that can not be billed to insurance (health coaches and [employer] access), and is non-refundable/non-transferable (you may be able to use a Health Savings Account; ask your HSA provider). There may be an optional meal replacement plan prescribed as well.   Medication therapy has been approved for the treatment of sleep apnea: The FDA approved tirzepatide for moderate to severe sleep apnea (apnea-hypopnea index greater than or equal to 15) in patients with BMI of greater than or equal to 30, or BMI greater than or equal to 27 with at least 1 weight-related condition such as hypertension or dyslipidemia.  Surgical management achieves meaningful long-term weight loss and improvement in health risks in most patients with more severe obesity.      Sleep Apnea Surgery:    Surgery for obstructive sleep apnea is considered generally only when other therapies fail to work. Surgery may be discussed with you if you are having a difficult time tolerating CPAP and or when there is an abnormal structure that requires surgical correction.  Nose and throat surgeries often enlarge the airway to prevent collapse.  Most of these surgeries create pain for 1-2 weeks and up to half of the most common surgeries are not effective throughout life.  You should carefully discuss the benefits and drawbacks to surgery with your sleep  provider and surgeon to determine if it is the best solution for you.   More information  Surgery for SEBASTIÁN is directed at areas that are responsible for narrowing or complete obstruction of the airway during sleep.  There are a wide range of procedures available to enlarge and/or stabilize the airway to prevent blockage of breathing in the three major areas where it can occur: the palate, tongue, and nasal regions.  Successful surgical treatment depends on the accurate identification of the factors responsible for obstructive sleep apnea in each person.  A personalized approach is required because there is no single treatment that works well for everyone.  Because of anatomic variation, consultation with an examination by a sleep surgeon is a critical first step in determining what surgical options are best for each patient.  In some cases, examination during sedation may be recommended in order to guide the selection of procedures.  Patients will be counseled about risks and benefits as well as the typical recovery course after surgery. Surgery is typically not a cure for a person s SEBASTIÁN.  However, surgery will often significantly improve one s SEBASTIÁN severity (termed  success rate ).  Even in the absence of a cure, surgery will decrease the cardiovascular risk associated with OSA7; improve overall quality of life8 (sleepiness, functionality, sleep quality, etc).      Palate Procedures:  Patients with SEBASTIÁN often have narrowing of their airway in the region of their tonsils and uvula.  The goals of palate procedures are to widen the airway in this region as well as to help the tissues resist collapse.  Modern palate procedure techniques focus on tissue conservation and soft tissue rearrangement, rather than tissue removal.  Often the uvula is preserved in this procedure. Residual sleep apnea is common in patient after pharyngoplasty with an average reduction in sleep apnea events of 33%2.      Tongue  Procedures:  ExamWhile patients are awake, the muscles that surround the throat are active and keep this region open for breathing. These muscles relax during sleep, allowing the tongue and other structures to collapse and block breathing.  There are several different tongue procedures available.  Selection of a tongue base procedure depends on characteristics seen on physical exam.  Generally, procedures are aimed at removing bulky tissues in this area or preventing the back of the tongue from falling back during sleep.  Success rates for tongue surgery range from 50-62%3.    Hypoglossal Nerve Stimulation:  Hypoglossal nerve stimulation has recently received approval from the [place] Food and Drug Administration for the treatment of obstructive sleep apnea.  This is based on research showing that the system was safe and effective in treating sleep apnea6.  Results showed that the median AHI score decreased 68%, from 29.3 to 9.0. This therapy uses an implant system that senses breathing patterns and delivers mild stimulation to airway muscles, which keeps the airway open during sleep.  The system consists of three fully implanted components: a small generator (similar in size to a pacemaker), a breathing sensor, and a stimulation lead.  Using a small handheld remote, a patient turns the therapy on before bed and off upon awakening.    Candidates for this device must be greater than 18 years of age, have moderate to severe obstructive sleep apnea with less than 25% central events  (AHI between 15-65), BMI less than 35, have tried CPAP/oral appliance for at least 8 weeks without success, and have appropriate upper airway anatomy (determined by a sleep endoscopy performed by Dr. Musa Richardson or Dr. Parish Toledo).    Nasal Procedures:  Nasal obstruction can interfere with nasal breathing during the day and night.  Studies have shown that relief of nasal obstruction can improve the ability of some patients to  tolerate positive airway pressure therapy for obstructive sleep apnea1.  Treatment options include medications such as nasal saline, topical corticosteroid and antihistamine sprays, and oral medications such as antihistamines or decongestants. Non-surgical treatments can include external nasal dilators for selected patients. If these are not successful by themselves, surgery can improve the nasal airway either alone or in combination with these other options.        Combination Procedures:  Combination of surgical procedures and other treatments may be recommended, particularly if patients have more than one area of narrowing or persistent positional disease.  The success rate of combination surgery ranges from 66-80%2,3.    References  Arjun ENGLAND. The Role of the Nose in Snoring and Obstructive Sleep Apnoea: An Update.  Eur Arch Otorhinolaryngol. 2011; 268: 1365-73.   Uvaldo SM; Jovanny JA; Alton JR; Pallanch JF; Tanisha MB; Claudia SG; Alban TORRES. Surgical modifications of the upper airway for obstructive sleep apnea in adults: a systematic review and meta-analysis. SLEEP 2010;33(10):9124-8390. Julito RAYO. Hypopharyngeal surgery in obstructive sleep apnea: an evidence-based medicine review.  Arch Otolaryngol Head Neck Surg. 2006 Feb;132(2):206-13.  Benjamin YH1, Christine Y, Oliverio PHILLIP. The efficacy of anatomically based multilevel surgery for obstructive sleep apnea. Otolaryngol Head Neck Surg. 2003 Oct;129(4):327-35.  Julito RAYO, Goldberg A. Hypopharyngeal Surgery in Obstructive Sleep Apnea: An Evidence-Based Medicine Review. Arch Otolaryngol Head Neck Surg. 2006 Feb;132(2):206-13.  Suellen IRENE et al. Upper-Airway Stimulation for Obstructive Sleep Apnea.  N Engl J Med. 2014 Jan 9;370(2):139-49.  Trudi Y et al. Increased Incidence of Cardiovascular Disease in Middle-aged Men with Obstructive Sleep Apnea. Am J Respir Crit Care Med; 2002 166: 159-165  Mayuri NATION et al. Studying Life Effects and Effectiveness of  Palatopharyngoplasty (SLEEP) study: Subjective Outcomes of Isolated Uvulopalatopharyngoplasty. Otolaryngol Head Neck Surg. 2011; 144: 623-631.        WHAT IF I ONLY HAVE SNORING?    Mandibular advancement devices, lateral sleep positioning, long-term weight loss and treatment of nasal allergies have been shown to improve snoring.  Exercising tongue muscles with a game (https://apps.NovoPedics/us/kenzie/fuseSPORT-reduce-snoring/rn5352561434) or stimulating the tongue during the day with a device (https://doi.org/10.3390/qyr38146640) have improved snoring in some individuals.  https://www.Tekmi.gis.to/  https://www.sleepfoundation.org/best-anti-snoring-mouthpieces-and-mouthguards    Remember to Drive Safe... Drive Alive     Sleep health profoundly affects your health, mood, and your safety.  Thirty three percent of the population (one in three of us) is not getting enough sleep and many have a sleep disorder. Not getting enough sleep or having an untreated / undertreated sleep condition may make us sleepy without even knowing it. In fact, our driving could be dramatically impaired due to our sleep health. As your provider, here are some things I would like you to know about driving:     Here are some warning signs for impairment and dangerous drowsy driving:              -Having been awake more than 16 hours               -Looking tired               -Eyelid drooping              -Head nodding (it could be too late at this point)              -Driving for more than 30 minutes     Some things you could do to make the driving safer if you are experiencing some drowsiness:              -Stop driving and rest              -Call for transportation              -Make sure your sleep disorder is adequately treated     Some things that have been shown NOT to work when experiencing drowsiness while driving:              -Turning on the radio              -Opening windows              -Eating any  distracting  /  entertaining   foods (e.g., sunflower seeds, candy, or any other)              -Talking on the phone      Your decision may not only impact your life, but also the life of others. Please, remember to drive safe for yourself and all of us.           Your Body mass index is 30.27 kg/m .  Weight management is a personal decision.  If you are interested in exploring weight loss strategies, the following discussion covers the approaches that may be successful. Body mass index (BMI) is one way to tell whether you are at a healthy weight, overweight, or obese. It measures your weight in relation to your height.  A BMI of 18.5 to 24.9 is in the healthy range. A person with a BMI of 25 to 29.9 is considered overweight, and someone with a BMI of 30 or greater is considered obese. More than two-thirds of American adults are considered overweight or obese.  Being overweight or obese increases the risk for further weight gain. Excess weight may lead to heart disease and diabetes.  Creating and following plans for healthy eating and physical activity may help you improve your health.  Weight control is part of healthy lifestyle and includes exercise, emotional health, and healthy eating habits. Careful eating habits lifelong are the mainstay of weight control. Though there are significant health benefits from weight loss, long-term weight loss with diet alone may be very difficult to achieve- studies show long-term success with dietary management in less than 10% of people. Attaining a healthy weight may be especially difficult to achieve in those with severe obesity. In some cases, medications, devices and surgical management might be considered.  What can you do?  If you are overweight or obese and are interested in methods for weight loss, you should discuss this with your provider.   Consider reducing daily calorie intake by 500 calories.   Keep a food journal.   Avoiding skipping meals, consider cutting portions instead.    Diet combined  with exercise helps maintain muscle while optimizing fat loss. Strength training is particularly important for building and maintaining muscle mass. Exercise helps reduce stress, increase energy, and improves fitness. Increasing exercise without diet control, however, may not burn enough calories to loose weight.     Start walking three days a week 10-20 minutes at a time  Work towards walking thirty minutes five days a week   Eventually, increase the speed of your walking for 1-2 minutes at time    In addition, we recommend that you review healthy lifestyles and methods for weight loss available through the National Institutes of Health patient information sites:  http://win.niddk.nih.gov/publications/index.htm    And look into health and wellness programs that may be available through your health insurance provider, employer, local community center, or ward club.

## 2025-02-25 NOTE — NURSING NOTE
Current patient location: [address] [place] [id number]    Is the patient currently in the state of [place]? YES    Visit mode: VIDEO    If the visit is dropped, the patient can be reconnected by:VIDEO VISIT: Text to cell phone:   Telephone Information:   Mobile [phone]       Will anyone else be joining the visit? NO  (If patient encounters technical issues they should call [phone]    Are changes needed to the allergy or medication list? No    Are refills needed on medications prescribed by this physician? NO    Rooming Documentation:  Not applicable    Reason for visit: Consult    Daniel GUTIÉRREZF

## 2025-03-18 ENCOUNTER — TRANSFERRED RECORDS (OUTPATIENT)
Dept: HEALTH INFORMATION MANAGEMENT | Facility: CLINIC | Age: 60
End: 2025-03-18
Payer: COMMERCIAL

## 2025-04-09 ENCOUNTER — OFFICE VISIT (OUTPATIENT)
Dept: GASTROENTEROLOGY | Facility: CLINIC | Age: 60
End: 2025-04-09
Attending: PHYSICIAN ASSISTANT
Payer: COMMERCIAL

## 2025-04-09 ENCOUNTER — LAB (OUTPATIENT)
Dept: LAB | Facility: CLINIC | Age: 60
End: 2025-04-09
Attending: PHYSICIAN ASSISTANT
Payer: COMMERCIAL

## 2025-04-09 ENCOUNTER — TELEPHONE (OUTPATIENT)
Dept: GASTROENTEROLOGY | Facility: CLINIC | Age: 60
End: 2025-04-09

## 2025-04-09 VITALS
TEMPERATURE: 98.3 F | DIASTOLIC BLOOD PRESSURE: 78 MMHG | OXYGEN SATURATION: 95 % | HEIGHT: 68 IN | BODY MASS INDEX: 31.07 KG/M2 | HEART RATE: 77 BPM | WEIGHT: 205 LBS | SYSTOLIC BLOOD PRESSURE: 126 MMHG | RESPIRATION RATE: 20 BRPM

## 2025-04-09 DIAGNOSIS — R79.89 LFT ELEVATION: ICD-10-CM

## 2025-04-09 DIAGNOSIS — K76.0 HEPATIC STEATOSIS: ICD-10-CM

## 2025-04-09 DIAGNOSIS — K76.0 HEPATIC STEATOSIS: Primary | ICD-10-CM

## 2025-04-09 DIAGNOSIS — R79.89 ABNORMAL CBC: ICD-10-CM

## 2025-04-09 LAB
ALBUMIN SERPL BCG-MCNC: 4.3 G/DL (ref 3.5–5.2)
ALP SERPL-CCNC: 113 U/L (ref 40–150)
ALT SERPL W P-5'-P-CCNC: 65 U/L (ref 0–70)
ANION GAP SERPL CALCULATED.3IONS-SCNC: 12 MMOL/L (ref 7–15)
AST SERPL W P-5'-P-CCNC: 48 U/L (ref 0–45)
BILIRUB DIRECT SERPL-MCNC: 0.1 MG/DL (ref 0–0.3)
BILIRUB SERPL-MCNC: 0.2 MG/DL
BUN SERPL-MCNC: 12.2 MG/DL (ref 6–20)
CALCIUM SERPL-MCNC: 9.3 MG/DL (ref 8.8–10.4)
CHLORIDE SERPL-SCNC: 104 MMOL/L (ref 98–107)
CHOLEST SERPL-MCNC: 149 MG/DL
CREAT SERPL-MCNC: 1 MG/DL (ref 0.67–1.17)
EGFRCR SERPLBLD CKD-EPI 2021: 87 ML/MIN/1.73M2
ERYTHROCYTE [DISTWIDTH] IN BLOOD BY AUTOMATED COUNT: 17.4 % (ref 10–15)
EST. AVERAGE GLUCOSE BLD GHB EST-MCNC: 120 MG/DL
GLUCOSE SERPL-MCNC: 110 MG/DL (ref 70–99)
HBA1C MFR BLD: 5.8 %
HCO3 SERPL-SCNC: 23 MMOL/L (ref 22–29)
HCT VFR BLD AUTO: 48.8 % (ref 40–53)
HDLC SERPL-MCNC: 30 MG/DL
HGB BLD-MCNC: 16.6 G/DL (ref 13.3–17.7)
INR PPP: 0.9 (ref 0.85–1.15)
LDLC SERPL CALC-MCNC: ABNORMAL MG/DL
LDLC SERPL DIRECT ASSAY-MCNC: 68 MG/DL
MCH RBC QN AUTO: 26.1 PG (ref 26.5–33)
MCHC RBC AUTO-ENTMCNC: 34 G/DL (ref 31.5–36.5)
MCV RBC AUTO: 77 FL (ref 78–100)
NONHDLC SERPL-MCNC: 119 MG/DL
PLATELET # BLD AUTO: 191 10E3/UL (ref 150–450)
POTASSIUM SERPL-SCNC: 4.2 MMOL/L (ref 3.4–5.3)
PROT SERPL-MCNC: 7 G/DL (ref 6.4–8.3)
RBC # BLD AUTO: 6.36 10E6/UL (ref 4.4–5.9)
SODIUM SERPL-SCNC: 139 MMOL/L (ref 135–145)
TRIGL SERPL-MCNC: 464 MG/DL
WBC # BLD AUTO: 13.5 10E3/UL (ref 4–11)

## 2025-04-09 PROCEDURE — 1125F AMNT PAIN NOTED PAIN PRSNT: CPT | Performed by: PHYSICIAN ASSISTANT

## 2025-04-09 PROCEDURE — 3078F DIAST BP <80 MM HG: CPT | Performed by: PHYSICIAN ASSISTANT

## 2025-04-09 PROCEDURE — 82248 BILIRUBIN DIRECT: CPT | Performed by: PATHOLOGY

## 2025-04-09 PROCEDURE — 80053 COMPREHEN METABOLIC PANEL: CPT | Performed by: PATHOLOGY

## 2025-04-09 PROCEDURE — 83721 ASSAY OF BLOOD LIPOPROTEIN: CPT | Performed by: PHYSICIAN ASSISTANT

## 2025-04-09 PROCEDURE — 85610 PROTHROMBIN TIME: CPT | Performed by: PATHOLOGY

## 2025-04-09 PROCEDURE — 3074F SYST BP LT 130 MM HG: CPT | Performed by: PHYSICIAN ASSISTANT

## 2025-04-09 PROCEDURE — 99214 OFFICE O/P EST MOD 30 MIN: CPT | Performed by: PHYSICIAN ASSISTANT

## 2025-04-09 PROCEDURE — 80061 LIPID PANEL: CPT | Performed by: PATHOLOGY

## 2025-04-09 PROCEDURE — 99000 SPECIMEN HANDLING OFFICE-LAB: CPT | Performed by: PATHOLOGY

## 2025-04-09 PROCEDURE — 36415 COLL VENOUS BLD VENIPUNCTURE: CPT | Performed by: PATHOLOGY

## 2025-04-09 PROCEDURE — 83036 HEMOGLOBIN GLYCOSYLATED A1C: CPT | Performed by: PHYSICIAN ASSISTANT

## 2025-04-09 PROCEDURE — 99213 OFFICE O/P EST LOW 20 MIN: CPT | Performed by: PHYSICIAN ASSISTANT

## 2025-04-09 PROCEDURE — 85027 COMPLETE CBC AUTOMATED: CPT | Performed by: PATHOLOGY

## 2025-04-09 RX ORDER — BUPRENORPHINE HYDROCHLORIDE 150 UG/1
150 FILM, SOLUBLE BUCCAL EVERY 12 HOURS
COMMUNITY

## 2025-04-09 RX ORDER — FLUOXETINE 10 MG/1
10 CAPSULE ORAL DAILY
COMMUNITY
Start: 2025-03-26

## 2025-04-09 ASSESSMENT — PAIN SCALES - GENERAL: PAINLEVEL_OUTOF10: SEVERE PAIN (7)

## 2025-04-09 NOTE — Clinical Note
Hieu Galan,      I saw this mutual patient of ours today in the hepatology clinic for follow-up.  Patient was not aware that he did not have scheduled follow-up with you in clinic as recommended.  Could you have your scheduling team contact him to schedule follow-up?  I believe your note stated return in 6 months, which would be May of this year.  Thanks, Zoya Coon PA-C

## 2025-04-09 NOTE — Clinical Note
Viktor Bartholomew,      I saw Chip today in clinic. He of course still complains of ongoing GI sx's: Nausea, vomiting, abdominal pain, loose stool.  Sounds like he is not currently taking Zofran or metoclopramide. He told me he has not seen you in a while and would like to schedule follow-up.  Are you willing to have your scheduling staff reach out to him to do this?   Thanks! Zoya

## 2025-04-09 NOTE — LETTER
4/9/2025      Pop Eaton III  [address] [facility] [address]      Dear Colleague,    Thank you for referring your patient, Pop Eaton III, to the [facility] [place]. Please see a copy of my visit note below.    Hepatology Clinic Note  Pop Eaton III   Date of Birth 1965    REASON FOR FOLLOW UP: fatty liver, abnormal LFTs          Assessment/plan:      60 YO M with PMHx significant for biliary colic, MDD, melanoma, depression, cervical spinal stenosis, RLS, PTSD, hx of colonic polyps, prediabetes and tobacco use disorder who presents for follow up regarding hx of abnormal LFTs and hepatic steatosis.      No known underlying liver disease/condition. No hx liver biopsy. Denies known fam hx of liver disease or liver cancer. Very rare alcohol consumption.     Waxing/waning, mildly elevated LFTs (ALT>AST), Likely 2/2 steatohepatitis in the setting of MAFLD vs other   Borderline + SHELDON (negative IgG and Factin; low threshold for consideration of autoimmune hepatitis)  Hepatic steatosis (S3), likely metabolic associated and not alcohol related      US abd comp 2/14/24: Increased echogenicity from diffuse fatty infiltration. No focal hepatic lesions. Gallbladder surgically absent. No biliary dilatation. CBD 6 mm. Portions of CBD could not be visualized due to overlying bowel gas.     CT abd/pelvis w contrast 3/3/24: Moderate diffuse hepatic steatosis. Cholecystectomy. No biliary ductal dilatation. Spleen normal. Pancreas normal.     FIB-4 Calculation: 1.81 at 4/9/2025     Fibroscan 4/17/24: S3 steatosis and F0/F1 fibrosis     Risk factors for metabolic fatty liver disease: Prediabetes, BMI 31, mixed dyslipidemia     Chronic vomiting, nausea, abdominal pain/bloating              > Discussed with pt that chronic abd pain/bloating is very unlikely to be 2/2 liver etiology   Hx IBS  Unintentional weight loss (wt now stable)              > Follows with General  GI; when asked about Zofran and metoclopramide, patient states he does not take Zofran and he does not believe he ever took metoclopramide              > S/p EGD Aug 2024 and colonoscopy Dec 2023     PLAN:  - Reviewed labs    > Added on lipid panel and hemoglobin A1c since patient is fasting  - Agree with having sleep study  - Told patient I would reach out to both Puma Hannah and Dr. Galan about arranging follow up appointments with pt   > Continue to follow with Hem/Onc and I routinely as recommended   > Discussed with pt how Dr. Galan recommended phlebotomy   - Previously recommended Hepatitis A vaccine with PCP/local pharmacist   - Re-discussed pathophysiology and natural hx of nonalcoholic fatty liver disease   - Continue to work on slow, gradual weight loss  - Maintain good control of cholesterol (No contraindication to starting a statin with recent LFT elevations)   - Optimize blood glucose PRN. Could consider GLP-1 inhibitor for both insulin resistance and help with weight loss  - Limit carbs, especially simple carbs, and follow Mediterranean eating patten  - Increase physical activity as able  - Continue with rare alcohol use in moderation  - Again, highly encouraged patient work on decreasing coffee and milk intake    > Both of these beverages are likely exacerbating his GI symptoms  - Continue to follow with PCP and GI regarding abdominal pain, nausea, vomiting and irregular bowel issues     RTC in 12 months with MELD labs same day   > Consider repeating fibroscan at next visit to see if there has been any imporvements in degree of steatosis and if not, seriously consider referral to medical weight management team    Zoya Coon PA-C  [facility]   -----------------------------------------------------         HPI:     Patient presents today to clinic for follow-up.  Had lab work today and states he was fasting.  Had black coffee.    When asked about changes to his medications,  patient states he has a new pain management medication called Belbuca.  States Belbuca was started in 2025.  He has noticed slight decrease in pain with Belbuca.  Denies any other recent changes to his medications.  When asked about metoclopramide and Zofran, patient states he has never heard of metoclopramide.  He is not taking Zofran.  States he uses hydroxyzine as needed.  Is on Prozac and Zyprexa.    Patient states his GI symptoms are still ongoing and unchanged.  Continues to have nausea on a daily basis.  Usually vomits every other day.  No blood in vomit.  Patient states frequency of vomiting has slightly improved.  Does admit to some reflux symptoms.  Continues to have relatively constant, achy left upper quadrant abdominal pain.  Occasionally this pain feels sharp.  Typically stool is either loose or watery.  Usually passes 3 bowel movements per day.  Denies bright red blood per rectum.  Patient states he occasionally notes black stool.  He denies fevers or chills.  Lately has been working on eating smaller portions.  Still tends to eat 1 meal per day.  Does not drink water.  Continues to drink 1 gallon of milk per day as well as 2 pots of coffee.  He is aware he still needs to work on decreasing caffeine intake.  He is eating lots of salad and his increased consumption of fish and chicken.  States he loves eating fruit and vegetables.  Patient states weight as of late has been stable between 190-200 LBS.  Patient states since fall 2023, has unintentionally lost 60 LBS.  For exercise, he walks 1.5 miles per day.    When asked about his visit with hematology/oncology patient found it frustrating that abnormal lab results were blamed on tobacco use.  When asked if he ever got phlebotomies as recommended, patient states he did not.  Patient admits he has a history of getting sick after phlebotomies.  He does want to schedule follow-up with Dr. Galan.  He states he may be willing to do 1 phlebotomy and see if it  helps his lab levels.  He also would like to follow-up with general GI provider.    States he has an upcoming sleep study.    Currently smokes 5-8 cigarettes per day.  No hx of IV drug use.  Occasionally uses THC Gummies at bedtime. No hx of alcohol abuse. Drinks 1-2 alcoholic beverages per year.        Previous work-up:     Lab Results   Component Value Date    HEPBANG Nonreactive 04/17/2024    HBCAB Nonreactive 04/17/2024    AUSAB 19.50 04/17/2024    HCVAB Nonreactive 02/12/2024    ALAN 170 04/17/2024    IRONSAT 32 04/17/2024    TTG 0.7 04/04/2024    TTGG 0.7 04/04/2024     04/04/2024    CER 31 04/17/2024    RONNIE Borderline Positive (A) 04/17/2024    ANAT1 1:80 04/17/2024    SMOOTHMUS 3 04/17/2024    A1A 95 04/17/2024    MITM2 <1.0 04/17/2024    TSH 1.91 04/17/2024    CHOL 149 04/09/2025    HDL 30 (L) 04/09/2025    LDL  04/09/2025      Comment:      Cannot estimate LDL when triglyceride exceeds 400 mg/dL    TRIG 464 (H) 04/09/2025    A1C 5.8 (H) 02/12/2024      Lab Results   Component Value Date    SPECDES  11/25/2024     Blood: ACD x5      SPECDES  11/25/2024     Blood: ACD x5      SPECDES  11/25/2024     Blood: ACD x5      SPECDES  11/25/2024     Blood: ACD      SPECDES  11/25/2024     Blood: ACD x5      LDRESULTS  11/25/2024     BCR::ABL1 MAJOR(p210) QUANTITATION RESULTS:  BCR::ABL1/ABL1 Major(p210):  UNDETECTABLE    INTERNAL CONTROL (NUMBER OF ABL1 TRANSCRIPTS): 50,600      LDRESULTS  11/25/2024     HEMOCHROMATOSIS RESULTS    HFE Gene C282Y (G845A) RESULTS:    C282Y Mutation Interpretation: HETEROZYGOTE    HFE Gene H63D (C187G) RESULTS:    H63D Mutation Interpretation: NORMAL    HFE Gene S65C (A193T) RESULTS:    S65C Mutation Interpretation: NORMAL      LDRESULTS  11/25/2024     BCR::ABL1 MINOR(p190) QUANTITATION RESULTS:  BCR::ABL1/ABL1 minor(p190):  UNDETECTABLE    INTERNAL CONTROL (NUMBER OF ABL1 TRANSCRIPTS): 21,700         HCV antibody: nonreactive   HAV Ab total:  nonreactive  HBV Sab: reactive,  19.5  HBV Sag: nonreactive  HBV Cab: nonreactive  HIV: nonreactive    TTG: negative   IgA: unremarkable   Cholesterol: 129  HDL: 27  LDL: 60  Triglycerides: 208  Hemoglobin A1c: 5.8  Alpha-1-antitrypsin: 95; M2Z            > M allele protein product is a normal variant. The Z allele protein product variant is a   deficiency variant (~15 percent of normal serum concentrations). Smokers with this phenotype have an increased rate of loss of lung elasticity but rarely develop clinical disease.      Ceruloplasmin: 31   TSH: 1.91  SHELDON: borderline +  F-actin: negative  AMA: negative  Iron: 103  Ferritin: 170  Iron Sats: 32%     PMH:    has a past medical history of Acute medial meniscus tear of right knee (10/14/2016), Biceps tendinitis of left shoulder (06/06/2021), and Restless legs syndrome (08/07/2018).     SMH:    has a past surgical history that includes Laparoscopic appendectomy (2010); laparoscopic cholecystectomy (2010); arthroscopy knee rt/lt (Left, 2013); surgical history of -  (Left, 1995); arthroscopy knee rt/lt (Right, 10/2016); colonoscopy (02/2017); Colonoscopy with CO2 insufflation (N/A, 02/15/2017); Colonoscopy (N/A, 12/19/2023); hernia repair (2010); and Esophagoscopy, gastroscopy, duodenoscopy (EGD), combined (N/A, 8/28/2024).     Medications:   Current Outpatient Medications   Medication Sig Dispense Refill     BELBUCA 150 MCG FILM buccal film Place 150 mcg inside cheek every 12 hours.       FLUoxetine (PROZAC) 10 MG capsule Take 10 mg by mouth daily.       hydrOXYzine HCl (ATARAX) 25 MG tablet TAKE 1 TABLET(25 MG) BY MOUTH THREE TIMES DAILY AS NEEDED FOR ITCHING OR NAUSEA OR VOMITING 60 tablet 1     OLANZapine (ZYPREXA) 2.5 MG tablet Take 2.5 mg by mouth 2 times daily as needed.       OLANZapine (ZYPREXA) 7.5 MG tablet Take 7.5 mg by mouth at bedtime.       No current facility-administered medications for this visit.     Recent Labs   Lab Test 10/08/24  1009 04/17/24  1333 03/27/24  1204  02/12/24  1235 08/02/23  1436 03/15/23  1533 07/19/22  1422 03/18/22  1414 09/27/21  1212 01/18/21  0915 07/30/18  1037   ALKPHOS 103 104 102 76 72 94 79 83 89  --  115   ALT 46 51 72* 86* 46 53 90* 91* 52 31 41   AST 40 35 50* 44 41 23 58* 54* 31 23 31   BILITOTAL 0.5 0.4 0.4 0.5 0.3 0.3 0.5 0.2 0.3  --  0.4          Allergies:     Allergies   Allergen Reactions     Wellbutrin [Bupropion]           Social History:     Social History     Socioeconomic History     Marital status:      Spouse name: Not on file     Number of children: Not on file     Years of education: Not on file     Highest education level: Not on file   Occupational History     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   Tobacco Use     Smoking status: Light Smoker     Current packs/day: 0.04     Types: Cigarettes     Smokeless tobacco: Never     Tobacco comments:     5-8 cigs per day   Vaping Use     Vaping status: Never Used   Substance and Sexual Activity     Alcohol use: Yes     Comment: 1-2 drinks per year; no hx of alcohol abuse     Drug use: No     Comment: THC gummies for sleep (use for ~2 mo); no hx IVDU     Sexual activity: Yes     Partners: Female   Other Topics Concern     Parent/sibling w/ CABG, MI or angioplasty before 65F 55M? Not Asked   Social History Narrative     Not on file     Social Drivers of Health     Financial Resource Strain: Low Risk  (11/17/2023)    Financial Resource Strain      Within the past 12 months, have you or your family members you live with been unable to get utilities (heat, electricity) when it was really needed?: No   Food Insecurity: Low Risk  (11/17/2023)    Food Insecurity      Within the past 12 months, did you worry that your food would run out before you got money to buy more?: No      Within the past 12 months, did the food you bought just not last and you didn t have money to get more?: No   Transportation Needs: Low Risk  (11/17/2023)    Transportation Needs      Within the past 12 months, has  "lack of transportation kept you from medical appointments, getting your medicines, non-medical meetings or appointments, work, or from getting things that you need?: No   Physical Activity: Not on file   Stress: Not on file   Social Connections: Unknown (12/27/2021)    Received from [facility], [facility]    Social Connections      Frequency of Communication with Friends and Family: Not on file   Interpersonal Safety: Low Risk  (8/28/2024)    Interpersonal Safety      Do you feel physically and emotionally safe where you currently live?: Yes      Within the past 12 months, have you been hit, slapped, kicked or otherwise physically hurt by someone?: No      Within the past 12 months, have you been humiliated or emotionally abused in other ways by your partner or ex-partner?: No   Housing Stability: Low Risk  (11/17/2023)    Housing Stability      Do you have housing? : Yes      Are you worried about losing your housing?: No          Family History:     Family History   Problem Relation Age of Onset     Prostate Cancer Maternal Uncle         X2     Liver Disease No family hx of      Liver Cancer No family hx of           Review of Systems:   Gen: See HPI          Physical Exam:   Vital signs:  Temp: 98.3  F (36.8  C) Temp src: Oral BP: 126/78 Pulse: 77   Resp: 20 SpO2: 95 %     Height: 172.7 cm (5' 7.99\") Weight: 93 kg (205 lb)  Estimated body mass index is 31.18 kg/m  as calculated from the following:    Height as of this encounter: 1.727 m (5' 7.99\").    Weight as of this encounter: 93 kg (205 lb).  Gen: A&Ox3, NAD  HEENT: Sclera anicteric  CV: RRR, no overt murmurs  Lung: CTA posteriorly, non-labored breathing  Abd: soft, ND, BS+, TTP in epigastric region, LUQ and RUQ  Ext: no edema, intact pulses.   Skin: No jaundice  Neuro: grossly intact  Psych: appropriate mood and affects         Data:   Reviewed in person and significant for:    Lab Results " "  Component Value Date     10/08/2024     06/21/2021      Lab Results   Component Value Date    POTASSIUM 4.1 10/08/2024    POTASSIUM 3.8 06/21/2021     Lab Results   Component Value Date    CHLORIDE 105 10/08/2024    CHLORIDE 109 06/21/2021     Lab Results   Component Value Date    CO2 25 10/08/2024    CO2 26 06/21/2021     Lab Results   Component Value Date    BUN 11.9 10/08/2024    BUN 11 06/21/2021     Lab Results   Component Value Date    CR 1.05 10/08/2024    CR 1.10 06/21/2021       Lab Results   Component Value Date    WBC 14.7 11/25/2024    WBC 13.7 08/07/2018     Lab Results   Component Value Date    HGB 15.7 11/25/2024    HGB 15.9 06/21/2021     Lab Results   Component Value Date    HCT 46.0 11/25/2024    HCT 47.9 08/07/2018     Lab Results   Component Value Date    MCV 77 11/25/2024    MCV 81 08/07/2018     Lab Results   Component Value Date     11/25/2024     08/07/2018       Lab Results   Component Value Date    AST 40 10/08/2024    AST 23 01/18/2021     Lab Results   Component Value Date    ALT 46 10/08/2024    ALT 31 01/18/2021     No results found for: \"BILICONJ\"   Lab Results   Component Value Date    BILITOTAL 0.5 10/08/2024    BILITOTAL 0.4 07/30/2018       Lab Results   Component Value Date    ALBUMIN 4.6 10/08/2024    ALBUMIN 3.8 03/15/2023    ALBUMIN 3.8 07/30/2018     Lab Results   Component Value Date    PROTTOTAL 7.3 10/08/2024    PROTTOTAL 7.6 07/30/2018      Lab Results   Component Value Date    ALKPHOS 103 10/08/2024    ALKPHOS 115 07/30/2018       Lab Results   Component Value Date    INR 0.99 10/08/2024    INR 0.84 02/20/2019         Imaging:      Liver FibroScan   4/17/24     INDICATION:    Liver fibrosis assessment     HISTORY:     RAVI/NAFLD     A series of at least 10 Vibration Controlled Transient Elastography (VCTE) measurements were performed by placing the XL probe over the center of the liver parenchyma and mechanically inducing a 50 Hertz shear wave. "     Each resulting VCTE measurement was analyzed to determine shear wave propagation speed and calculate the equivalent liver stiffness.     All measurements were reviewed by the  and physician for technical accuracy. Data variability across the acquired measurements was quantified with IQR/Median Percentage.     FINDINGS:     The median liver stiffness was 4.9 kPa with IQR/Median percentage of 11 %.     The measure CAP ultrasound attenuation rate value was 374 dB/m.     IMPRESSION:       1. Estimated liver fibrosis is Stage 0-1   2. Steatosis Grade S3     The results of the FibroScan examination were assessed by taking into account the quality of the measurement thumbnails, number of measurements, and IQR/Median ratio. I have personally reviewed the examination and initial interpretation, and I agree with the findings.      Monet Servin PA-C           CT ABDOMEN PELVIS W CONTRAST 3/3/24    1.  No CT evidence of acute abdominal or pelvic process.  Narrative    For Patients: As a result of the 21st Century Cures Act, medical imaging exams and procedure reports are released immediately into your electronic medical record. You may view this report before your referring provider. If you have questions, please contact your health care provider.    EXAM: CT ABDOMEN PELVIS W  LOCATION: [facility]  DATE: 3/3/2024    INDICATION: Abdominal or pelvic pain  COMPARISON: None.  TECHNIQUE: CT scan of the abdomen and pelvis was performed following injection of IV contrast. Multiplanar reformats were obtained. Dose reduction techniques were used.  CONTRAST: Omnipaque 350 87mL    FINDINGS:  LOWER CHEST: Normal.    HEPATOBILIARY: Moderate diffuse hepatic steatosis. Cholecystectomy. No biliary ductal dilatation.    PANCREAS: Normal.    SPLEEN: Normal.    ADRENAL GLANDS: Normal.    KIDNEYS/BLADDER: Symmetric enhancement of both kidneys. No hydronephrosis. Urinary bladder only minimally distended though otherwise  unremarkable.    BOWEL: Small and large bowel loops are normal in caliber without obstruction. Appendix is normal in appearance. No free fluid or pneumoperitoneum.    LYMPH NODES: No abdominal or pelvic lymphadenopathy.    VASCULATURE: Abdominal aorta normal in caliber with mild mixed atheromatous plaque. Major portal veins are patent.    PELVIC ORGANS: Unremarkable.    MUSCULOSKELETAL: Tiny umbilical hernia containing fat and short segment of nonobstructed small bowel.          US ABDOMEN COMPLETE 2/14/2024 11:09 AM     CLINICAL HISTORY: Patient having issue with vomiting in am, pain on  palpation to right upper quadrant and right and left lower quadrant,  history of IBS, had gallbalder and appendix removed; Nausea and  vomiting, unspecified vomiting type  TECHNIQUE: Complete abdominal ultrasound.  COMPARISON: None.     FINDINGS:  GALLBLADDER: Surgically absent.     BILE DUCTS: No biliary dilatation. The common duct measures 6 mm. Portions of the common duct could not be visualized due to overlying bowel gas.     LIVER: Increased echogenicity from diffuse fatty infiltration. No  focal hepatic lesions are identified.     RIGHT KIDNEY: Normal size. Normal echogenicity with no hydronephrosis or mass.      LEFT KIDNEY: Normal size. Normal echogenicity with no hydronephrosis  or mass.      SPLEEN: Normal.     PANCREAS: Pancreas appears heterogenous.     AORTA: Normal caliber where seen.      IVC: Normal where visualized.     No ascites.                                                                   IMPRESSION:  1.  Heterogenous appearance of the pancreas that may suggest pancreatitis. Recommend correlation with lipase levels and if further  concern, suggest CT exam.  2.  Diffuse hepatic steatosis.     CELIA MCKEON MD          Again, thank you for allowing me to participate in the care of your patient.        Sincerely,        Zoya Coon PA-C    Electronically signed

## 2025-04-09 NOTE — TELEPHONE ENCOUNTER
4/9/2025 2:06PM  Patient Contacted for the patient to call back and schedule the following:    Appointment type: Return GI  Provider: Puma Hannah PA-C  Return date: Next available  Specialty phone number: [phone]  Additional appointment(s) needed: NA  Additonal Notes: 4/9 Pt will call back to schedule. I will follow-up in 2 weeks to make sure something is scheduled since the call ended abruptly. Return GI w/ Puma Hannah- video or in person ok. [facility]   Rheumatology, Gastroenterology, Nephrology, Infectious Disease, Pulmonology  [facility]        ----- Message from Hailey GONZALES sent at 4/9/2025  1:57 PM CDT -----  Regarding: Follow up visit  Puma Sotelo PA-C is requesting  a follow-up appt for Chip. In-person or VV is ok.     Please reach out with any other questions or concerns.  Thanks,  More GONZALES RN  [facility]   Gastroenterology    [phone]

## 2025-04-09 NOTE — NURSING NOTE
"Chief Complaint   Patient presents with    RECHECK     Hepatic steatosis      Vital signs:  Temp: 98.3  F (36.8  C) Temp src: Oral BP: 126/78 Pulse: 77   Resp: 20 SpO2: 95 %     Height: 172.7 cm (5' 7.99\") Weight: 93 kg (205 lb)  Estimated body mass index is 31.18 kg/m  as calculated from the following:    Height as of this encounter: 1.727 m (5' 7.99\").    Weight as of this encounter: 93 kg (205 lb).      Sue Phipps, [facility]  4/9/2025 11:00 AM    "

## 2025-04-09 NOTE — PROGRESS NOTES
Hepatology Clinic Note  Pop Eaton III   Date of Birth 1965    REASON FOR FOLLOW UP: fatty liver, abnormal LFTs          Assessment/plan:      58 YO M with PMHx significant for biliary colic, MDD, melanoma, depression, cervical spinal stenosis, RLS, PTSD, hx of colonic polyps, prediabetes and tobacco use disorder who presents for follow up regarding hx of abnormal LFTs and hepatic steatosis.      No known underlying liver disease/condition. No hx liver biopsy. Denies known fam hx of liver disease or liver cancer. Very rare alcohol consumption.     Waxing/waning, mildly elevated LFTs (ALT>AST), Likely 2/2 steatohepatitis in the setting of MAFLD vs other   Borderline + SHELDON (negative IgG and Factin; low threshold for consideration of autoimmune hepatitis)  Hepatic steatosis (S3), likely metabolic associated and not alcohol related      US abd comp 2/14/24: Increased echogenicity from diffuse fatty infiltration. No focal hepatic lesions. Gallbladder surgically absent. No biliary dilatation. CBD 6 mm. Portions of CBD could not be visualized due to overlying bowel gas.     CT abd/pelvis w contrast 3/3/24: Moderate diffuse hepatic steatosis. Cholecystectomy. No biliary ductal dilatation. Spleen normal. Pancreas normal.     FIB-4 Calculation: 1.81 at 4/9/2025     Fibroscan 4/17/24: S3 steatosis and F0/F1 fibrosis     Risk factors for metabolic fatty liver disease: Prediabetes, BMI 31, mixed dyslipidemia     Chronic vomiting, nausea, abdominal pain/bloating              > Discussed with pt that chronic abd pain/bloating is very unlikely to be 2/2 liver etiology   Hx IBS  Unintentional weight loss (wt now stable)              > Follows with General GI; when asked about Zofran and metoclopramide, patient states he does not take Zofran and he does not believe he ever took metoclopramide              > S/p EGD Aug 2024 and colonoscopy Dec 2023     PLAN:  - Reviewed labs    > Added on lipid panel and hemoglobin A1c  since patient is fasting  - Agree with having sleep study  - Told patient I would reach out to both Puma Hannah and Dr. Galan about arranging follow up appointments with pt   > Continue to follow with Hem/Onc and I routinely as recommended   > Discussed with pt how Dr. Galan recommended phlebotomy   - Previously recommended Hepatitis A vaccine with PCP/local pharmacist   - Re-discussed pathophysiology and natural hx of nonalcoholic fatty liver disease   - Continue to work on slow, gradual weight loss  - Maintain good control of cholesterol (No contraindication to starting a statin with recent LFT elevations)   - Optimize blood glucose PRN. Could consider GLP-1 inhibitor for both insulin resistance and help with weight loss  - Limit carbs, especially simple carbs, and follow Mediterranean eating patten  - Increase physical activity as able  - Continue with rare alcohol use in moderation  - Again, highly encouraged patient work on decreasing coffee and milk intake    > Both of these beverages are likely exacerbating his GI symptoms  - Continue to follow with PCP and GI regarding abdominal pain, nausea, vomiting and irregular bowel issues     RTC in 12 months with MELD labs same day   > Consider repeating fibroscan at next visit to see if there has been any imporvements in degree of steatosis and if not, seriously consider referral to medical weight management team    Zoya Coon PA-C  [facility] Hepatology   -----------------------------------------------------         HPI:     Patient presents today to clinic for follow-up.  Had lab work today and states he was fasting.  Had black coffee.    When asked about changes to his medications, patient states he has a new pain management medication called Belbuca.  States Belbuca was started in 2025.  He has noticed slight decrease in pain with Belbuca.  Denies any other recent changes to his medications.  When asked about metoclopramide and Zofran, patient  states he has never heard of metoclopramide.  He is not taking Zofran.  States he uses hydroxyzine as needed.  Is on Prozac and Zyprexa.    Patient states his GI symptoms are still ongoing and unchanged.  Continues to have nausea on a daily basis.  Usually vomits every other day.  No blood in vomit.  Patient states frequency of vomiting has slightly improved.  Does admit to some reflux symptoms.  Continues to have relatively constant, achy left upper quadrant abdominal pain.  Occasionally this pain feels sharp.  Typically stool is either loose or watery.  Usually passes 3 bowel movements per day.  Denies bright red blood per rectum.  Patient states he occasionally notes black stool.  He denies fevers or chills.  Lately has been working on eating smaller portions.  Still tends to eat 1 meal per day.  Does not drink water.  Continues to drink 1 gallon of milk per day as well as 2 pots of coffee.  He is aware he still needs to work on decreasing caffeine intake.  He is eating lots of salad and his increased consumption of fish and chicken.  States he loves eating fruit and vegetables.  Patient states weight as of late has been stable between 190-200 LBS.  Patient states since fall 2023, has unintentionally lost 60 LBS.  For exercise, he walks 1.5 miles per day.    When asked about his visit with hematology/oncology patient found it frustrating that abnormal lab results were blamed on tobacco use.  When asked if he ever got phlebotomies as recommended, patient states he did not.  Patient admits he has a history of getting sick after phlebotomies.  He does want to schedule follow-up with Dr. Galan.  He states he may be willing to do 1 phlebotomy and see if it helps his lab levels.  He also would like to follow-up with general GI provider.    States he has an upcoming sleep study.    Currently smokes 5-8 cigarettes per day.  No hx of IV drug use.  Occasionally uses THC Gummies at bedtime. No hx of alcohol abuse. Drinks 1-2  alcoholic beverages per year.        Previous work-up:     Lab Results   Component Value Date    HEPBANG Nonreactive 04/17/2024    HBCAB Nonreactive 04/17/2024    AUSAB 19.50 04/17/2024    HCVAB Nonreactive 02/12/2024    ALAN 170 04/17/2024    IRONSAT 32 04/17/2024    TTG 0.7 04/04/2024    TTGG 0.7 04/04/2024     04/04/2024    CER 31 04/17/2024    RONNIE Borderline Positive (A) 04/17/2024    ANAT1 1:80 04/17/2024    SMOOTHMUS 3 04/17/2024    A1A 95 04/17/2024    MITM2 <1.0 04/17/2024    TSH 1.91 04/17/2024    CHOL 149 04/09/2025    HDL 30 (L) 04/09/2025    LDL  04/09/2025      Comment:      Cannot estimate LDL when triglyceride exceeds 400 mg/dL    TRIG 464 (H) 04/09/2025    A1C 5.8 (H) 02/12/2024      Lab Results   Component Value Date    SPECDES  11/25/2024     Blood: ACD x5      SPECDES  11/25/2024     Blood: ACD x5      SPECDES  11/25/2024     Blood: ACD x5      SPECDES  11/25/2024     Blood: ACD      SPECDES  11/25/2024     Blood: ACD x5      LDRESULTS  11/25/2024     BCR::ABL1 MAJOR(p210) QUANTITATION RESULTS:  BCR::ABL1/ABL1 Major(p210):  UNDETECTABLE    INTERNAL CONTROL (NUMBER OF ABL1 TRANSCRIPTS): 50,600      LDRESULTS  11/25/2024     HEMOCHROMATOSIS RESULTS    HFE Gene C282Y (G845A) RESULTS:    C282Y Mutation Interpretation: HETEROZYGOTE    HFE Gene H63D (C187G) RESULTS:    H63D Mutation Interpretation: NORMAL    HFE Gene S65C (A193T) RESULTS:    S65C Mutation Interpretation: NORMAL      LDRESULTS  11/25/2024     BCR::ABL1 MINOR(p190) QUANTITATION RESULTS:  BCR::ABL1/ABL1 minor(p190):  UNDETECTABLE    INTERNAL CONTROL (NUMBER OF ABL1 TRANSCRIPTS): 21,700         HCV antibody: nonreactive   HAV Ab total:  nonreactive  HBV Sab: reactive, 19.5  HBV Sag: nonreactive  HBV Cab: nonreactive  HIV: nonreactive    TTG: negative   IgA: unremarkable   Cholesterol: 129  HDL: 27  LDL: 60  Triglycerides: 208  Hemoglobin A1c: 5.8  Alpha-1-antitrypsin: 95; M2Z            > M allele protein product is a normal variant.  The Z allele protein product variant is a   deficiency variant (~15 percent of normal serum concentrations). Smokers with this phenotype have an increased rate of loss of lung elasticity but rarely develop clinical disease.      Ceruloplasmin: 31   TSH: 1.91  SHELDON: borderline +  F-actin: negative  AMA: negative  Iron: 103  Ferritin: 170  Iron Sats: 32%     PMH:    has a past medical history of Acute medial meniscus tear of right knee (10/14/2016), Biceps tendinitis of left shoulder (06/06/2021), and Restless legs syndrome (08/07/2018).     SMH:    has a past surgical history that includes Laparoscopic appendectomy (2010); laparoscopic cholecystectomy (2010); arthroscopy knee rt/lt (Left, 2013); surgical history of -  (Left, 1995); arthroscopy knee rt/lt (Right, 10/2016); colonoscopy (02/2017); Colonoscopy with CO2 insufflation (N/A, 02/15/2017); Colonoscopy (N/A, 12/19/2023); hernia repair (2010); and Esophagoscopy, gastroscopy, duodenoscopy (EGD), combined (N/A, 8/28/2024).     Medications:   Current Outpatient Medications   Medication Sig Dispense Refill    BELBUCA 150 MCG FILM buccal film Place 150 mcg inside cheek every 12 hours.      FLUoxetine (PROZAC) 10 MG capsule Take 10 mg by mouth daily.      hydrOXYzine HCl (ATARAX) 25 MG tablet TAKE 1 TABLET(25 MG) BY MOUTH THREE TIMES DAILY AS NEEDED FOR ITCHING OR NAUSEA OR VOMITING 60 tablet 1    OLANZapine (ZYPREXA) 2.5 MG tablet Take 2.5 mg by mouth 2 times daily as needed.      OLANZapine (ZYPREXA) 7.5 MG tablet Take 7.5 mg by mouth at bedtime.       No current facility-administered medications for this visit.     Recent Labs   Lab Test 10/08/24  1009 04/17/24  1333 03/27/24  1204 02/12/24  1235 08/02/23  1436 03/15/23  1533 07/19/22  1422 03/18/22  1414 09/27/21  1212 01/18/21  0915 07/30/18  1037   ALKPHOS 103 104 102 76 72 94 79 83 89  --  115   ALT 46 51 72* 86* 46 53 90* 91* 52 31 41   AST 40 35 50* 44 41 23 58* 54* 31 23 31   BILITOTAL 0.5 0.4 0.4 0.5 0.3 0.3  0.5 0.2 0.3  --  0.4          Allergies:     Allergies   Allergen Reactions    Wellbutrin [Bupropion]           Social History:     Social History     Socioeconomic History    Marital status:      Spouse name: Not on file    Number of children: Not on file    Years of education: Not on file    Highest education level: Not on file   Occupational History    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   Tobacco Use    Smoking status: Light Smoker     Current packs/day: 0.04     Types: Cigarettes    Smokeless tobacco: Never    Tobacco comments:     5-8 cigs per day   Vaping Use    Vaping status: Never Used   Substance and Sexual Activity    Alcohol use: Yes     Comment: 1-2 drinks per year; no hx of alcohol abuse    Drug use: No     Comment: THC gummies for sleep (use for ~2 mo); no hx IVDU    Sexual activity: Yes     Partners: Female   Other Topics Concern    Parent/sibling w/ CABG, MI or angioplasty before 65F 55M? Not Asked   Social History Narrative    Not on file     Social Drivers of Health     Financial Resource Strain: Low Risk  (11/17/2023)    Financial Resource Strain     Within the past 12 months, have you or your family members you live with been unable to get utilities (heat, electricity) when it was really needed?: No   Food Insecurity: Low Risk  (11/17/2023)    Food Insecurity     Within the past 12 months, did you worry that your food would run out before you got money to buy more?: No     Within the past 12 months, did the food you bought just not last and you didn t have money to get more?: No   Transportation Needs: Low Risk  (11/17/2023)    Transportation Needs     Within the past 12 months, has lack of transportation kept you from medical appointments, getting your medicines, non-medical meetings or appointments, work, or from getting things that you need?: No   Physical Activity: Not on file   Stress: Not on file   Social Connections: Unknown (12/27/2021)    Received from [facility] &  "[facility], [facility] & [facility]    Social Connections     Frequency of Communication with Friends and Family: Not on file   Interpersonal Safety: Low Risk  (8/28/2024)    Interpersonal Safety     Do you feel physically and emotionally safe where you currently live?: Yes     Within the past 12 months, have you been hit, slapped, kicked or otherwise physically hurt by someone?: No     Within the past 12 months, have you been humiliated or emotionally abused in other ways by your partner or ex-partner?: No   Housing Stability: Low Risk  (11/17/2023)    Housing Stability     Do you have housing? : Yes     Are you worried about losing your housing?: No          Family History:     Family History   Problem Relation Age of Onset    Prostate Cancer Maternal Uncle         X2    Liver Disease No family hx of     Liver Cancer No family hx of           Review of Systems:   Gen: See HPI          Physical Exam:   Vital signs:  Temp: 98.3  F (36.8  C) Temp src: Oral BP: 126/78 Pulse: 77   Resp: 20 SpO2: 95 %     Height: 172.7 cm (5' 7.99\") Weight: 93 kg (205 lb)  Estimated body mass index is 31.18 kg/m  as calculated from the following:    Height as of this encounter: 1.727 m (5' 7.99\").    Weight as of this encounter: 93 kg (205 lb).  Gen: A&Ox3, NAD  HEENT: Sclera anicteric  CV: RRR, no overt murmurs  Lung: CTA posteriorly, non-labored breathing  Abd: soft, ND, BS+, TTP in epigastric region, LUQ and RUQ  Ext: no edema, intact pulses.   Skin: No jaundice  Neuro: grossly intact  Psych: appropriate mood and affects         Data:   Reviewed in person and significant for:    Lab Results   Component Value Date     10/08/2024     06/21/2021      Lab Results   Component Value Date    POTASSIUM 4.1 10/08/2024    POTASSIUM 3.8 06/21/2021     Lab Results   Component Value Date    CHLORIDE 105 10/08/2024    CHLORIDE 109 06/21/2021     Lab Results   Component Value Date    CO2 25 10/08/2024    " "CO2 26 06/21/2021     Lab Results   Component Value Date    BUN 11.9 10/08/2024    BUN 11 06/21/2021     Lab Results   Component Value Date    CR 1.05 10/08/2024    CR 1.10 06/21/2021       Lab Results   Component Value Date    WBC 14.7 11/25/2024    WBC 13.7 08/07/2018     Lab Results   Component Value Date    HGB 15.7 11/25/2024    HGB 15.9 06/21/2021     Lab Results   Component Value Date    HCT 46.0 11/25/2024    HCT 47.9 08/07/2018     Lab Results   Component Value Date    MCV 77 11/25/2024    MCV 81 08/07/2018     Lab Results   Component Value Date     11/25/2024     08/07/2018       Lab Results   Component Value Date    AST 40 10/08/2024    AST 23 01/18/2021     Lab Results   Component Value Date    ALT 46 10/08/2024    ALT 31 01/18/2021     No results found for: \"BILICONJ\"   Lab Results   Component Value Date    BILITOTAL 0.5 10/08/2024    BILITOTAL 0.4 07/30/2018       Lab Results   Component Value Date    ALBUMIN 4.6 10/08/2024    ALBUMIN 3.8 03/15/2023    ALBUMIN 3.8 07/30/2018     Lab Results   Component Value Date    PROTTOTAL 7.3 10/08/2024    PROTTOTAL 7.6 07/30/2018      Lab Results   Component Value Date    ALKPHOS 103 10/08/2024    ALKPHOS 115 07/30/2018       Lab Results   Component Value Date    INR 0.99 10/08/2024    INR 0.84 02/20/2019         Imaging:      Liver FibroScan   4/17/24     INDICATION:    Liver fibrosis assessment     HISTORY:     RAVI/NAFLD     A series of at least 10 Vibration Controlled Transient Elastography (VCTE) measurements were performed by placing the XL probe over the center of the liver parenchyma and mechanically inducing a 50 Hertz shear wave.     Each resulting VCTE measurement was analyzed to determine shear wave propagation speed and calculate the equivalent liver stiffness.     All measurements were reviewed by the  and physician for technical accuracy. Data variability across the acquired measurements was quantified with IQR/Median " Percentage.     FINDINGS:     The median liver stiffness was 4.9 kPa with IQR/Median percentage of 11 %.     The measure CAP ultrasound attenuation rate value was 374 dB/m.     IMPRESSION:       1. Estimated liver fibrosis is Stage 0-1   2. Steatosis Grade S3     The results of the FibroScan examination were assessed by taking into account the quality of the measurement thumbnails, number of measurements, and IQR/Median ratio. I have personally reviewed the examination and initial interpretation, and I agree with the findings.      Monet Servin PA-C           CT ABDOMEN PELVIS W CONTRAST 3/3/24    1.  No CT evidence of acute abdominal or pelvic process.  Narrative    For Patients: As a result of the 21st Century Cures Act, medical imaging exams and procedure reports are released immediately into your electronic medical record. You may view this report before your referring provider. If you have questions, please contact your health care provider.    EXAM: CT ABDOMEN PELVIS W  LOCATION: [facility]  DATE: 3/3/2024    INDICATION: Abdominal or pelvic pain  COMPARISON: None.  TECHNIQUE: CT scan of the abdomen and pelvis was performed following injection of IV contrast. Multiplanar reformats were obtained. Dose reduction techniques were used.  CONTRAST: Omnipaque 350 87mL    FINDINGS:  LOWER CHEST: Normal.    HEPATOBILIARY: Moderate diffuse hepatic steatosis. Cholecystectomy. No biliary ductal dilatation.    PANCREAS: Normal.    SPLEEN: Normal.    ADRENAL GLANDS: Normal.    KIDNEYS/BLADDER: Symmetric enhancement of both kidneys. No hydronephrosis. Urinary bladder only minimally distended though otherwise unremarkable.    BOWEL: Small and large bowel loops are normal in caliber without obstruction. Appendix is normal in appearance. No free fluid or pneumoperitoneum.    LYMPH NODES: No abdominal or pelvic lymphadenopathy.    VASCULATURE: Abdominal aorta normal in caliber with mild mixed atheromatous plaque. Major  portal veins are patent.    PELVIC ORGANS: Unremarkable.    MUSCULOSKELETAL: Tiny umbilical hernia containing fat and short segment of nonobstructed small bowel.          US ABDOMEN COMPLETE 2/14/2024 11:09 AM     CLINICAL HISTORY: Patient having issue with vomiting in am, pain on  palpation to right upper quadrant and right and left lower quadrant,  history of IBS, had gallbalder and appendix removed; Nausea and  vomiting, unspecified vomiting type  TECHNIQUE: Complete abdominal ultrasound.  COMPARISON: None.     FINDINGS:  GALLBLADDER: Surgically absent.     BILE DUCTS: No biliary dilatation. The common duct measures 6 mm. Portions of the common duct could not be visualized due to overlying bowel gas.     LIVER: Increased echogenicity from diffuse fatty infiltration. No  focal hepatic lesions are identified.     RIGHT KIDNEY: Normal size. Normal echogenicity with no hydronephrosis or mass.      LEFT KIDNEY: Normal size. Normal echogenicity with no hydronephrosis  or mass.      SPLEEN: Normal.     PANCREAS: Pancreas appears heterogenous.     AORTA: Normal caliber where seen.      IVC: Normal where visualized.     No ascites.                                                                   IMPRESSION:  1.  Heterogenous appearance of the pancreas that may suggest pancreatitis. Recommend correlation with lipase levels and if further  concern, suggest CT exam.  2.  Diffuse hepatic steatosis.     CELIA MCKEON MD

## 2025-04-15 ENCOUNTER — TRANSFERRED RECORDS (OUTPATIENT)
Dept: HEALTH INFORMATION MANAGEMENT | Facility: CLINIC | Age: 60
End: 2025-04-15
Payer: COMMERCIAL

## 2025-05-27 ENCOUNTER — TRANSFERRED RECORDS (OUTPATIENT)
Dept: HEALTH INFORMATION MANAGEMENT | Facility: CLINIC | Age: 60
End: 2025-05-27
Payer: COMMERCIAL

## 2025-05-30 ENCOUNTER — THERAPY VISIT (OUTPATIENT)
Dept: SLEEP MEDICINE | Facility: CLINIC | Age: 60
End: 2025-05-30
Attending: PHYSICIAN ASSISTANT
Payer: COMMERCIAL

## 2025-05-30 DIAGNOSIS — R06.00 DYSPNEA AND RESPIRATORY ABNORMALITY: ICD-10-CM

## 2025-05-30 DIAGNOSIS — E66.811 CLASS 1 OBESITY DUE TO EXCESS CALORIES WITH SERIOUS COMORBIDITY AND BODY MASS INDEX (BMI) OF 30.0 TO 30.9 IN ADULT: ICD-10-CM

## 2025-05-30 DIAGNOSIS — R53.83 MALAISE AND FATIGUE: ICD-10-CM

## 2025-05-30 DIAGNOSIS — R53.81 MALAISE AND FATIGUE: ICD-10-CM

## 2025-05-30 DIAGNOSIS — G47.09 OTHER INSOMNIA: ICD-10-CM

## 2025-05-30 DIAGNOSIS — D75.1 ERYTHROCYTOSIS: ICD-10-CM

## 2025-05-30 DIAGNOSIS — E66.09 CLASS 1 OBESITY DUE TO EXCESS CALORIES WITH SERIOUS COMORBIDITY AND BODY MASS INDEX (BMI) OF 30.0 TO 30.9 IN ADULT: ICD-10-CM

## 2025-05-30 DIAGNOSIS — R06.89 DYSPNEA AND RESPIRATORY ABNORMALITY: ICD-10-CM

## 2025-05-30 DIAGNOSIS — R06.83 SNORING: ICD-10-CM

## 2025-05-30 DIAGNOSIS — Z72.820 LACK OF ADEQUATE SLEEP: ICD-10-CM

## 2025-06-08 PROBLEM — F32.9 MAJOR DEPRESSION: Status: ACTIVE | Noted: 2021-10-19

## 2025-06-08 PROBLEM — F11.20 OPIOID DEPENDENCE (H): Status: ACTIVE | Noted: 2025-06-08

## 2025-06-08 PROBLEM — H93.13 TINNITUS, BILATERAL: Chronic | Status: ACTIVE | Noted: 2021-03-22

## 2025-06-08 PROBLEM — D75.1 ERYTHROCYTOSIS: Status: ACTIVE | Noted: 2025-02-22

## 2025-06-08 PROBLEM — G89.4 CHRONIC PAIN DISORDER: Status: ACTIVE | Noted: 2025-06-08

## 2025-06-08 PROBLEM — D72.829 LEUKOCYTOSIS: Status: ACTIVE | Noted: 2025-02-22

## 2025-06-08 PROBLEM — F51.04 CHRONIC INSOMNIA: Chronic | Status: ACTIVE | Noted: 2025-02-25

## 2025-06-08 PROBLEM — F33.1 MAJOR DEPRESSIVE DISORDER, RECURRENT EPISODE, MODERATE (H): Chronic | Status: ACTIVE | Noted: 2021-10-19

## 2025-06-08 PROBLEM — H90.3 SNHL (SENSORY-NEURAL HEARING LOSS), ASYMMETRICAL: Chronic | Status: ACTIVE | Noted: 2021-03-22

## 2025-06-09 LAB — SLPCOMP: NORMAL

## 2025-06-10 ENCOUNTER — RESULTS FOLLOW-UP (OUTPATIENT)
Dept: SLEEP MEDICINE | Facility: CLINIC | Age: 60
End: 2025-06-10

## 2025-07-14 ENCOUNTER — OFFICE VISIT (OUTPATIENT)
Dept: INTERNAL MEDICINE | Facility: CLINIC | Age: 60
End: 2025-07-14
Payer: COMMERCIAL

## 2025-07-14 VITALS
SYSTOLIC BLOOD PRESSURE: 134 MMHG | DIASTOLIC BLOOD PRESSURE: 84 MMHG | BODY MASS INDEX: 31.22 KG/M2 | HEART RATE: 77 BPM | HEIGHT: 68 IN | TEMPERATURE: 98.7 F | WEIGHT: 206 LBS | OXYGEN SATURATION: 97 % | RESPIRATION RATE: 16 BRPM

## 2025-07-14 DIAGNOSIS — F33.1 MAJOR DEPRESSIVE DISORDER, RECURRENT EPISODE, MODERATE (H): Chronic | ICD-10-CM

## 2025-07-14 DIAGNOSIS — Z01.818 PREOP GENERAL PHYSICAL EXAM: Primary | ICD-10-CM

## 2025-07-14 DIAGNOSIS — M62.08 DIASTASIS RECTI: ICD-10-CM

## 2025-07-14 DIAGNOSIS — K42.9 UMBILICAL HERNIA WITHOUT OBSTRUCTION AND WITHOUT GANGRENE: ICD-10-CM

## 2025-07-14 DIAGNOSIS — Z87.891 HISTORY OF TOBACCO USE, PRESENTING HAZARDS TO HEALTH: ICD-10-CM

## 2025-07-14 PROBLEM — F41.9 ANXIETY DISORDER: Status: ACTIVE | Noted: 2023-04-03

## 2025-07-14 PROBLEM — C44.310 BASAL CELL CARCINOMA (BCC) OF FACE: Status: ACTIVE | Noted: 2023-09-22

## 2025-07-14 PROBLEM — F11.20 OPIOID DEPENDENCE (H): Status: RESOLVED | Noted: 2025-06-08 | Resolved: 2025-07-14

## 2025-07-14 LAB
ALBUMIN SERPL BCG-MCNC: 4.2 G/DL (ref 3.5–5.2)
ALP SERPL-CCNC: 102 U/L (ref 40–150)
ALT SERPL W P-5'-P-CCNC: 31 U/L (ref 0–70)
ANION GAP SERPL CALCULATED.3IONS-SCNC: 11 MMOL/L (ref 7–15)
AST SERPL W P-5'-P-CCNC: 34 U/L (ref 0–45)
BASOPHILS # BLD AUTO: 0.1 10E3/UL (ref 0–0.2)
BASOPHILS NFR BLD AUTO: 0 %
BILIRUB SERPL-MCNC: 0.2 MG/DL
BUN SERPL-MCNC: 10.1 MG/DL (ref 6–20)
CALCIUM SERPL-MCNC: 9.1 MG/DL (ref 8.8–10.4)
CHLORIDE SERPL-SCNC: 105 MMOL/L (ref 98–107)
CREAT SERPL-MCNC: 1.02 MG/DL (ref 0.67–1.17)
EGFRCR SERPLBLD CKD-EPI 2021: 85 ML/MIN/1.73M2
EOSINOPHIL # BLD AUTO: 0.1 10E3/UL (ref 0–0.7)
EOSINOPHIL NFR BLD AUTO: 1 %
ERYTHROCYTE [DISTWIDTH] IN BLOOD BY AUTOMATED COUNT: 16 % (ref 10–15)
GLUCOSE SERPL-MCNC: 91 MG/DL (ref 70–99)
HCO3 SERPL-SCNC: 24 MMOL/L (ref 22–29)
HCT VFR BLD AUTO: 47 % (ref 40–53)
HGB BLD-MCNC: 15.5 G/DL (ref 13.3–17.7)
IMM GRANULOCYTES # BLD: 0 10E3/UL
IMM GRANULOCYTES NFR BLD: 0 %
LYMPHOCYTES # BLD AUTO: 3.1 10E3/UL (ref 0.8–5.3)
LYMPHOCYTES NFR BLD AUTO: 21 %
MCH RBC QN AUTO: 25.7 PG (ref 26.5–33)
MCHC RBC AUTO-ENTMCNC: 33 G/DL (ref 31.5–36.5)
MCV RBC AUTO: 78 FL (ref 78–100)
MONOCYTES # BLD AUTO: 0.7 10E3/UL (ref 0–1.3)
MONOCYTES NFR BLD AUTO: 5 %
NEUTROPHILS # BLD AUTO: 11.2 10E3/UL (ref 1.6–8.3)
NEUTROPHILS NFR BLD AUTO: 74 %
PLATELET # BLD AUTO: 197 10E3/UL (ref 150–450)
POTASSIUM SERPL-SCNC: 4.1 MMOL/L (ref 3.4–5.3)
PROT SERPL-MCNC: 6.8 G/DL (ref 6.4–8.3)
RBC # BLD AUTO: 6.04 10E6/UL (ref 4.4–5.9)
SODIUM SERPL-SCNC: 140 MMOL/L (ref 135–145)
WBC # BLD AUTO: 15.2 10E3/UL (ref 4–11)

## 2025-07-14 PROCEDURE — 99215 OFFICE O/P EST HI 40 MIN: CPT

## 2025-07-14 PROCEDURE — G2211 COMPLEX E/M VISIT ADD ON: HCPCS

## 2025-07-14 PROCEDURE — 1126F AMNT PAIN NOTED NONE PRSNT: CPT

## 2025-07-14 PROCEDURE — 3079F DIAST BP 80-89 MM HG: CPT

## 2025-07-14 PROCEDURE — 93000 ELECTROCARDIOGRAM COMPLETE: CPT

## 2025-07-14 PROCEDURE — 36415 COLL VENOUS BLD VENIPUNCTURE: CPT

## 2025-07-14 PROCEDURE — 3075F SYST BP GE 130 - 139MM HG: CPT

## 2025-07-14 PROCEDURE — 85025 COMPLETE CBC W/AUTO DIFF WBC: CPT

## 2025-07-14 PROCEDURE — 80053 COMPREHEN METABOLIC PANEL: CPT

## 2025-07-14 ASSESSMENT — ANXIETY QUESTIONNAIRES
2. NOT BEING ABLE TO STOP OR CONTROL WORRYING: MORE THAN HALF THE DAYS
5. BEING SO RESTLESS THAT IT IS HARD TO SIT STILL: MORE THAN HALF THE DAYS
GAD7 TOTAL SCORE: 18
IF YOU CHECKED OFF ANY PROBLEMS ON THIS QUESTIONNAIRE, HOW DIFFICULT HAVE THESE PROBLEMS MADE IT FOR YOU TO DO YOUR WORK, TAKE CARE OF THINGS AT HOME, OR GET ALONG WITH OTHER PEOPLE: EXTREMELY DIFFICULT
GAD7 TOTAL SCORE: 18
6. BECOMING EASILY ANNOYED OR IRRITABLE: MORE THAN HALF THE DAYS
4. TROUBLE RELAXING: NEARLY EVERY DAY
3. WORRYING TOO MUCH ABOUT DIFFERENT THINGS: NEARLY EVERY DAY
7. FEELING AFRAID AS IF SOMETHING AWFUL MIGHT HAPPEN: NEARLY EVERY DAY
1. FEELING NERVOUS, ANXIOUS, OR ON EDGE: NEARLY EVERY DAY

## 2025-07-14 ASSESSMENT — PATIENT HEALTH QUESTIONNAIRE - PHQ9
10. IF YOU CHECKED OFF ANY PROBLEMS, HOW DIFFICULT HAVE THESE PROBLEMS MADE IT FOR YOU TO DO YOUR WORK, TAKE CARE OF THINGS AT HOME, OR GET ALONG WITH OTHER PEOPLE: VERY DIFFICULT
SUM OF ALL RESPONSES TO PHQ QUESTIONS 1-9: 18
SUM OF ALL RESPONSES TO PHQ QUESTIONS 1-9: 18

## 2025-07-14 ASSESSMENT — PAIN SCALES - GENERAL: PAINLEVEL_OUTOF10: NO PAIN (0)

## 2025-07-14 NOTE — PATIENT INSTRUCTIONS
How to Take Your Medication Before Surgery  Preoperative Medication Instructions   Antiplatelet or Anticoagulation Medication Instructions   - We reviewed the medication list and the patient is not on an antiplatelet or anticoagulation medications.    Additional Medication Instructions   - ibuprofen (Advil, Motrin): DO NOT TAKE 1 day before surgery.    - naproxen (Aleve, Naprosyn): DO NOT TAKE 4 days before surgery.    - SSRIs, SNRIs, TCAs, Antipsychotics: Continue without modification.        Patient Education   Preparing for Your Surgery  For Adults  Getting started  In most cases, a nurse will call to review your health history and instructions. They will give you an arrival time based on your scheduled surgery time. Please be ready to share:  Your doctor's clinic name and phone number  Your medical, surgical, and anesthesia history  A list of allergies and sensitivities  A list of medicines, including herbal treatments and over-the-counter drugs  Whether the patient has a legal guardian (ask how to send us the papers in advance)  Note: You may not receive a call if you were seen at our PAC (Preoperative Assessment Center).  Please tell us if you're pregnant--or if there's any chance you might be pregnant. Some surgeries may injure a fetus (unborn baby), so they require a pregnancy test. Surgeries that are safe for a fetus don't always need a test, and you can choose whether to have one.   Preparing for surgery  Within 10 to 30 days of surgery: Have a pre-op exam (sometimes called an H&P, or History and Physical). This can be done at a clinic or pre-operative center.  If you're having a , you may not need this exam. Talk to your care team.  At your pre-op exam, talk to your care team about all medicines you take. (This includes CBD oil and any drugs, such as THC, marijuana, and other forms of cannabis.) If you need to stop any medicine before surgery, ask when to start taking it again.  This is for your  safety. Many medicines and drugs can make you bleed too much during surgery. Some change how well surgery (anesthesia) drugs work.  Call your insurance company to let them know you're having surgery. (If you don't have insurance, call [phone].)  Call your clinic if there's any change in your health. This includes a scrape or scratch near the surgery site, or any signs of a cold (sore throat, runny nose, cough, rash, fever).  Eating and drinking guidelines  For your safety: Unless your surgeon tells you otherwise, follow the guidelines below.  Eat and drink as normal until 8 hours before you arrive for surgery. After that, no food or milk. You can spit out gum when you arrive.  Drink clear liquids until 2 hours before you arrive. These are liquids you can see through, like water, Gatorade, and Propel Water. They also include plain black coffee and tea (no cream or milk).  No alcohol for 24 hours before you arrive. The night before surgery, stop any drinks that contain THC.  If your care team tells you to take medicine on the morning of surgery, it's okay to take it with a sip of water. No other medicines or drugs are allowed (including CBD oil)--follow your care team's instructions.  If you have questions the day of surgery, call your hospital or surgery center.   Preventing infection  Shower or bathe the night before and the morning of surgery. Follow the instructions your clinic gave you. (If no instructions, use regular soap.)  Don't shave or clip hair near your surgery site. We'll remove the hair if needed.  Don't smoke or vape the morning of surgery. No chewing tobacco for 6 hours before you arrive. A nicotine patch is okay. You may spit out nicotine gum when you arrive.  For some surgeries, the surgeon will tell you to fully quit smoking and nicotine.  We will make every effort to keep you safe from infection. We will:  Clean our hands often with soap and water (or an alcohol-based hand rub).  Clean the  skin at your surgery site with a special soap that kills germs.  Give you a special gown to keep you warm. (Cold raises the risk of infection.)  Wear hair covers, masks, gowns, and gloves during surgery.  Give antibiotic medicine, if prescribed. Not all surgeries need this medicine.  What to bring on the day of surgery  Photo ID and insurance card  Copy of your health care directive, if you have one  Glasses and hearing aids (bring cases)  You can't wear contacts during surgery  Inhaler and eye drops, if you use them (tell us about these when you arrive)  CPAP machine or breathing device, if you use them  A few personal items, if spending the night  If you have . . .  A pacemaker, ICD (cardiac defibrillator), or other implant: Bring the ID card.  An implanted stimulator: Bring the remote control.  A legal guardian: Bring a copy of the certified (court-stamped) guardianship papers.  Please remove any jewelry, including body piercings. Leave jewelry and other valuables at home.  If you're going home the day of surgery  You must have a support person drive you home. They should stay with you overnight, and they may need to help with your self-care.  If you don't have a support person, please tells us as soon as possible. We can help.  After surgery  If it's hard to control your pain or you need more pain medicine, please call your surgeon's office.  Questions?   If you have any questions for your care team, list them here:   ____________________________________________________________________________________________________________________________________________________________________________________________________________________________________________________________  For informational purposes only. Not to replace the advice of your health care provider. Copyright   2003, 2019 [facility]. All rights reserved. Clinically reviewed by Mike Mitchell MD. [employer] [id number] - REV 02/25.

## 2025-07-14 NOTE — PROGRESS NOTES
[facility]  [facility]  [address]  [place] [address]  Phone: [phone]  Primary Provider: ADRIEN Pineda CNP  Pre-op Performing Provider: ADRIEN Pineda CNP  Jul 14, 2025 7/14/2025   Surgical Information   What procedure is being done? herna   Facility or Hospital where procedure/surgery will be performed: unclear   Who is doing the procedure / surgery? forgot   Date of surgery / procedure: 7/24/25   Time of surgery / procedure: unclear   Where do you plan to recover after surgery? at home with family     Fax number for surgical facility: Note does not need to be faxed, will be available electronically in Epic.    Assessment & Plan     The proposed surgical procedure is considered INTERMEDIATE risk.    (Z01.818) Preop general physical exam  (primary encounter diagnosis)  Comment: Patient seen in clinic for pre op for surgery to repair hernia and diastasis recti. No issue noted on exam. Discussed ok to reach out after surgery if needing medication for pain and nausea.   Plan: EKG 12-lead complete w/read - Clinics,         Comprehensive Metabolic Panel, CBC with         Platelets & Differential ([facility] Only)        Pending     (Z87.891) History of tobacco use, presenting hazards to health  Comment: Chronic, unstable. Discussed smoking cessation not interested at this time.   Plan: May pursue in future.     (M62.08) Diastasis recti  Comment: Chronic. Is having surgery to repair.   Plan: Surgery    (F33.1) Major depressive disorder, recurrent episode, moderate (H)  Comment: Chronic, on medication to help manage symptoms. Denies self harm or suicidal ideation at visit.   Plan: No change to current plan of care.    (K42.9) Umbilical hernia without obstruction and without gangrene  Comment: Chronic. Is having surgery to assess old repair.   Plan: Surgery       Possible Sleep Apnea: Recently had sleep study no need for CPAP seen            - No identified additional risk factors other than previously addressed    Antiplatelet or Anticoagulation Medication Instructions   - We reviewed the medication list and the patient is not on an antiplatelet or anticoagulation medications.    Additional Medication Instructions   - ibuprofen (Advil, Motrin): DO NOT TAKE 1 day before surgery.    - naproxen (Aleve, Naprosyn): DO NOT TAKE 4 days before surgery.    - SSRIs, SNRIs, TCAs, Antipsychotics: Continue without modification.     Recommendation  Approval given to proceed with proposed procedure, without further diagnostic evaluation.        Jaden Gentile is a 59 year old, presenting for the following:  Pre-Op Exam          7/14/2025     1:07 PM   Additional Questions   Roomed by Jessica TOBAR: Pre op no concerns noted on exam          7/14/2025   Pre-Op Questionnaire   Have you ever had a heart attack or stroke? No   Have you ever had surgery on your heart or blood vessels, such as a stent placement, a coronary artery bypass, or surgery on an artery in your head, neck, heart, or legs? No   Do you have chest pain with activity? No   Do you have a history of heart failure? No   Do you currently have a cold, bronchitis or symptoms of other infection? No   Do you have a cough, shortness of breath, or wheezing? No   Do you or anyone in your family have previous history of blood clots? No   Do you or does anyone in your family have a serious bleeding problem such as prolonged bleeding following surgeries or cuts? No   Have you ever had problems with anemia or been told to take iron pills? No   Have you had any abnormal blood loss such as black, tarry or bloody stools? No   Have you ever had a blood transfusion? No   Are you willing to have a blood transfusion if it is medically needed before, during, or after your surgery? Yes   Have you or any of your relatives ever had problems with anesthesia? No   Do you have sleep apnea, excessive snoring or daytime  drowsiness? (!) YES   Do you have a CPAP machine? (!) NO    Do you have any artifical heart valves or other implanted medical devices like a pacemaker, defibrillator, or continuous glucose monitor? No   Do you have artificial joints? No   Are you allergic to latex? No     Advance Care Planning    Discussed advance care planning with patient; however, patient declined at this time.    Preoperative Review of    reviewed - Not currently on controlled substances          Patient Active Problem List    Diagnosis Date Noted    Opioid dependence (H) 06/08/2025     Priority: Medium    Chronic pain disorder 06/08/2025     Priority: Medium    Class 1 obesity due to excess calories with serious comorbidity and body mass index (BMI) of 30.0 to 30.9 in adult 02/25/2025     Priority: Medium    Chronic insomnia 02/25/2025     Priority: Medium    Leukocytosis 02/22/2025     Priority: Medium    Erythrocytosis 02/22/2025     Priority: Medium    Umbilical hernia without obstruction and without gangrene 11/25/2024     Priority: Medium    Diastasis recti 11/25/2024     Priority: Medium    Tobacco use disorder 04/17/2024     Priority: Medium    Hepatic steatosis 04/17/2024     Priority: Medium    Prediabetes 04/17/2024     Priority: Medium    Melanoma of skin (H) 05/22/2023     Priority: Medium    Major depressive disorder, recurrent episode, moderate (H) 10/19/2021     Priority: Medium    Subcoracoid bursitis of left shoulder 06/06/2021     Priority: Medium    Rotator cuff impingement syndrome of left shoulder 06/01/2021     Priority: Medium    SNHL (sensory-neural hearing loss), asymmetrical 03/22/2021     Priority: Medium    Tinnitus, bilateral 03/22/2021     Priority: Medium    Cervical pseudoarthrosis (H) 06/26/2019     Priority: Medium     Treated surgically with posterior fusion C5-7 on 6/26/2019 by Dr. Caro.      Cervical spinal stenosis 02/27/2019     Priority: Medium     Treated surgically with ACDF C5-7 on 2/27/2019 by  Dr. Caro.      Scapular dyskinesis 11/13/2018     Priority: Medium    Impingement syndrome, shoulder, right 11/13/2018     Priority: Medium    Family history of prostate cancer 08/07/2018     Priority: Medium    History of colonic polyps, 2/2017 02/15/2017     Priority: Medium    Patellofemoral syndrome, right 11/29/2016     Priority: Medium    Left knee pain 06/05/2013     Priority: Medium      Past Medical History:   Diagnosis Date    Acute medial meniscus tear of right knee 10/14/2016    Biceps tendinitis of left shoulder 06/06/2021    Biliary colic 12/21/2011    Cancer (H) 01/01/2015    not sure of date but had mole that was cancerous removed.    Depressive disorder June 2020     Past Surgical History:   Procedure Laterality Date    ARTHROSCOPY KNEE RT/LT Left 2013    meniscal tear     ARTHROSCOPY KNEE RT/LT Right 10/2016    medial meniscal tear     BIOPSY      Right bicep area    COLONOSCOPY  02/2017    Q 3 years for polyps    COLONOSCOPY N/A 12/19/2023    Procedure: COLONOSCOPY, WITH POLYPECTOMY;  Surgeon: Gulshan Braden MD;  Location: [facility]    COLONOSCOPY WITH CO2 INSUFFLATION N/A 02/15/2017    Procedure: COLONOSCOPY WITH CO2 INSUFFLATION;  Surgeon: Galileo Lezama DO;  Location:  OR    ESOPHAGOSCOPY, GASTROSCOPY, DUODENOSCOPY (EGD), COMBINED N/A 08/28/2024    Procedure: ESOPHAGOGASTRODUODENOSCOPY, WITH BIOPSY;  Surgeon: Estefani Diaz MD;  Location: [facility] OR    EYE SURGERY Left 1995    corneal transplant X 2 on left eye    HERNIA REPAIR  2010    LAPAROSCOPIC APPENDECTOMY  2010    LAPAROSCOPIC CHOLECYSTECTOMY  2010     Current Outpatient Medications   Medication Sig Dispense Refill    FLUoxetine (PROZAC) 10 MG capsule Take 10 mg by mouth daily.      hydrOXYzine HCl (ATARAX) 25 MG tablet TAKE 1 TABLET(25 MG) BY MOUTH THREE TIMES DAILY AS NEEDED FOR ITCHING OR NAUSEA OR VOMITING 60 tablet 1    OLANZapine (ZYPREXA) 2.5 MG tablet Take 2.5 mg by mouth 2 times daily as needed.      OLANZapine (ZYPREXA) 7.5  "MG tablet Take 7.5 mg by mouth at bedtime.      BELBUCA 150 MCG FILM buccal film Place 150 mcg inside cheek every 12 hours.         Allergies   Allergen Reactions    Wellbutrin [Bupropion]         Social History     Tobacco Use    Smoking status: Light Smoker     Current packs/day: 0.00     Average packs/day: 0.5 packs/day for 33.8 years (16.9 ttl pk-yrs)     Types: Cigarettes     Start date: 3/18/1986     Last attempt to quit: 2020     Years since quittin.5    Smokeless tobacco: Former     Quit date: 2001    Tobacco comments:     Working on other mental health issues first   Substance Use Topics    Alcohol use: Yes     Comment: 1-2 drinks per year; no hx of alcohol abuse       History   Drug Use No     Comment: THC gummies for sleep (use for ~2 mo); no hx IVDU             Review of Systems  Constitutional, neuro, ENT, endocrine, pulmonary, cardiac, gastrointestinal, genitourinary, musculoskeletal, integument and psychiatric systems are negative, except as otherwise noted.    Objective    /84   Pulse 77   Temp 98.7  F (37.1  C) (Oral)   Resp 16   Ht 1.727 m (5' 8\")   Wt 93.4 kg (206 lb)   SpO2 97%   BMI 31.32 kg/m     Estimated body mass index is 31.32 kg/m  as calculated from the following:    Height as of this encounter: 1.727 m (5' 8\").    Weight as of this encounter: 93.4 kg (206 lb).  Physical Exam  GENERAL: alert and no distress  EYES: Eyes grossly normal to inspection, PERRL and conjunctivae and sclerae normal  HENT: ear canals and TM's normal, nose and mouth without ulcers or lesions  NECK: no adenopathy, no asymmetry, masses, or scars  RESP: lungs clear to auscultation - no rales, rhonchi or wheezes  CV: regular rate and rhythm, normal S1 S2, no S3 or S4, no murmur, click or rub, no peripheral edema  ABDOMEN: soft, nontender, no hepatosplenomegaly, no masses and bowel sounds normal  MS: no gross musculoskeletal defects noted, no edema  SKIN: no suspicious lesions or rashes  NEURO: " Normal strength and tone, mentation intact and speech normal  PSYCH: mentation appears normal, affect normal/bright    Recent Labs   Lab Test 04/09/25  1003 11/25/24  1553 10/08/24  1009   HGB 16.6 15.7 16.9    177 188   INR 0.90  --  0.99     --  141   POTASSIUM 4.2  --  4.1   CR 1.00  --  1.05   A1C 5.8*  --   --         Diagnostics  Labs pending at this time.  Results will be reviewed when available.  Recent Results (from the past 24 hours)   CBC with platelets and differential    Collection Time: 07/14/25  2:06 PM   Result Value Ref Range    WBC Count 15.2 (H) 4.0 - 11.0 10e3/uL    RBC Count 6.04 (H) 4.40 - 5.90 10e6/uL    Hemoglobin 15.5 13.3 - 17.7 g/dL    Hematocrit 47.0 40.0 - 53.0 %    MCV 78 78 - 100 fL    MCH 25.7 (L) 26.5 - 33.0 pg    MCHC 33.0 31.5 - 36.5 g/dL    RDW 16.0 (H) 10.0 - 15.0 %    Platelet Count 197 150 - 450 10e3/uL    % Neutrophils 74 %    % Lymphocytes 21 %    % Monocytes 5 %    % Eosinophils 1 %    % Basophils 0 %    % Immature Granulocytes 0 %    Absolute Neutrophils 11.2 (H) 1.6 - 8.3 10e3/uL    Absolute Lymphocytes 3.1 0.8 - 5.3 10e3/uL    Absolute Monocytes 0.7 0.0 - 1.3 10e3/uL    Absolute Eosinophils 0.1 0.0 - 0.7 10e3/uL    Absolute Basophils 0.1 0.0 - 0.2 10e3/uL    Absolute Immature Granulocytes 0.0 <=0.4 10e3/uL      EKG: appears normal, NSR, normal axis, normal intervals, no acute ST/T changes c/w ischemia, no LVH by voltage criteria, unchanged from previous tracings    Revised Cardiac Risk Index (RCRI)  The patient has the following serious cardiovascular risks for perioperative complications:   - No serious cardiac risks = 0 points     RCRI Interpretation: 0 points: Class I (very low risk - 0.4% complication rate)        I spent a total of 40 minutes on the day of the visit.   Time spent by me today doing chart review, history and exam, documentation and further activities per the note   The longitudinal plan of care for the diagnosis(es)/condition(s) as  documented were addressed during this visit. Due to the added complexity in care, I will continue to support Chip in the subsequent management and with ongoing continuity of care.    Signed Electronically by: ADRIEN Pineda CNP  A copy of this evaluation report is provided to the requesting physician.        Answers submitted by the patient for this visit:  Patient Health Questionnaire (Submitted on 7/14/2025)  If you checked off any problems, how difficult have these problems made it for you to do your work, take care of things at home, or get along with other people?: Very difficult  PHQ9 TOTAL SCORE: 18

## 2025-07-14 NOTE — H&P (VIEW-ONLY)
Pre[facility] Evaluation  [facility]  [address]  [place] [address]  Phone: [phone]  Primary Provider: ADRIEN Pineda CNP  Pre-op Performing Provider: ADRIEN Pineda CNP  Jul 14, 2025 7/14/2025   Surgical Information   What procedure is being done? herna   Facility or Hospital where procedure/surgery will be performed: unclear   Who is doing the procedure / surgery? forgot   Date of surgery / procedure: 7/24/25   Time of surgery / procedure: unclear   Where do you plan to recover after surgery? at home with family     Fax number for surgical facility: Note does not need to be faxed, will be available electronically in Epic.    Assessment & Plan     The proposed surgical procedure is considered INTERMEDIATE risk.    (Z01.818) Preop general physical exam  (primary encounter diagnosis)  Comment: Patient seen in clinic for pre op for surgery to repair hernia and diastasis recti. No issue noted on exam. Discussed ok to reach out after surgery if needing medication for pain and nausea.   Plan: EKG 12-lead complete w/read - Clinics,         Comprehensive Metabolic Panel, CBC with         Platelets & Differential ([facility] Only)        Pending     (Z87.891) History of tobacco use, presenting hazards to health  Comment: Chronic, unstable. Discussed smoking cessation not interested at this time.   Plan: May pursue in future.     (M62.08) Diastasis recti  Comment: Chronic. Is having surgery to repair.   Plan: Surgery    (F33.1) Major depressive disorder, recurrent episode, moderate (H)  Comment: Chronic, on medication to help manage symptoms. Denies self harm or suicidal ideation at visit.   Plan: No change to current plan of care.    (K42.9) Umbilical hernia without obstruction and without gangrene  Comment: Chronic. Is having surgery to assess old repair.   Plan: Surgery       Possible Sleep Apnea: Recently had sleep study no need for CPAP seen            - No identified additional risk factors other than previously addressed    Antiplatelet or Anticoagulation Medication Instructions   - We reviewed the medication list and the patient is not on an antiplatelet or anticoagulation medications.    Additional Medication Instructions   - ibuprofen (Advil, Motrin): DO NOT TAKE 1 day before surgery.    - naproxen (Aleve, Naprosyn): DO NOT TAKE 4 days before surgery.    - SSRIs, SNRIs, TCAs, Antipsychotics: Continue without modification.     Recommendation  Approval given to proceed with proposed procedure, without further diagnostic evaluation.        Jaden Gentile is a 59 year old, presenting for the following:  Pre-Op Exam          7/14/2025     1:07 PM   Additional Questions   Roomed by Jessica TOBAR: Pre op no concerns noted on exam          7/14/2025   Pre-Op Questionnaire   Have you ever had a heart attack or stroke? No   Have you ever had surgery on your heart or blood vessels, such as a stent placement, a coronary artery bypass, or surgery on an artery in your head, neck, heart, or legs? No   Do you have chest pain with activity? No   Do you have a history of heart failure? No   Do you currently have a cold, bronchitis or symptoms of other infection? No   Do you have a cough, shortness of breath, or wheezing? No   Do you or anyone in your family have previous history of blood clots? No   Do you or does anyone in your family have a serious bleeding problem such as prolonged bleeding following surgeries or cuts? No   Have you ever had problems with anemia or been told to take iron pills? No   Have you had any abnormal blood loss such as black, tarry or bloody stools? No   Have you ever had a blood transfusion? No   Are you willing to have a blood transfusion if it is medically needed before, during, or after your surgery? Yes   Have you or any of your relatives ever had problems with anesthesia? No   Do you have sleep apnea, excessive snoring or daytime  drowsiness? (!) YES   Do you have a CPAP machine? (!) NO    Do you have any artifical heart valves or other implanted medical devices like a pacemaker, defibrillator, or continuous glucose monitor? No   Do you have artificial joints? No   Are you allergic to latex? No     Advance Care Planning    Discussed advance care planning with patient; however, patient declined at this time.    Preoperative Review of    reviewed - Not currently on controlled substances          Patient Active Problem List    Diagnosis Date Noted    Opioid dependence (H) 06/08/2025     Priority: Medium    Chronic pain disorder 06/08/2025     Priority: Medium    Class 1 obesity due to excess calories with serious comorbidity and body mass index (BMI) of 30.0 to 30.9 in adult 02/25/2025     Priority: Medium    Chronic insomnia 02/25/2025     Priority: Medium    Leukocytosis 02/22/2025     Priority: Medium    Erythrocytosis 02/22/2025     Priority: Medium    Umbilical hernia without obstruction and without gangrene 11/25/2024     Priority: Medium    Diastasis recti 11/25/2024     Priority: Medium    Tobacco use disorder 04/17/2024     Priority: Medium    Hepatic steatosis 04/17/2024     Priority: Medium    Prediabetes 04/17/2024     Priority: Medium    Melanoma of skin (H) 05/22/2023     Priority: Medium    Major depressive disorder, recurrent episode, moderate (H) 10/19/2021     Priority: Medium    Subcoracoid bursitis of left shoulder 06/06/2021     Priority: Medium    Rotator cuff impingement syndrome of left shoulder 06/01/2021     Priority: Medium    SNHL (sensory-neural hearing loss), asymmetrical 03/22/2021     Priority: Medium    Tinnitus, bilateral 03/22/2021     Priority: Medium    Cervical pseudoarthrosis (H) 06/26/2019     Priority: Medium     Treated surgically with posterior fusion C5-7 on 6/26/2019 by Dr. Caro.      Cervical spinal stenosis 02/27/2019     Priority: Medium     Treated surgically with ACDF C5-7 on 2/27/2019 by  Dr. Caro.      Scapular dyskinesis 11/13/2018     Priority: Medium    Impingement syndrome, shoulder, right 11/13/2018     Priority: Medium    Family history of prostate cancer 08/07/2018     Priority: Medium    History of colonic polyps, 2/2017 02/15/2017     Priority: Medium    Patellofemoral syndrome, right 11/29/2016     Priority: Medium    Left knee pain 06/05/2013     Priority: Medium      Past Medical History:   Diagnosis Date    Acute medial meniscus tear of right knee 10/14/2016    Biceps tendinitis of left shoulder 06/06/2021    Biliary colic 12/21/2011    Cancer (H) 01/01/2015    not sure of date but had mole that was cancerous removed.    Depressive disorder June 2020     Past Surgical History:   Procedure Laterality Date    ARTHROSCOPY KNEE RT/LT Left 2013    meniscal tear     ARTHROSCOPY KNEE RT/LT Right 10/2016    medial meniscal tear     BIOPSY      Right bicep area    COLONOSCOPY  02/2017    Q 3 years for polyps    COLONOSCOPY N/A 12/19/2023    Procedure: COLONOSCOPY, WITH POLYPECTOMY;  Surgeon: Gulshan Braden MD;  Location: [facility]    COLONOSCOPY WITH CO2 INSUFFLATION N/A 02/15/2017    Procedure: COLONOSCOPY WITH CO2 INSUFFLATION;  Surgeon: Galileo Lezama DO;  Location:  OR    ESOPHAGOSCOPY, GASTROSCOPY, DUODENOSCOPY (EGD), COMBINED N/A 08/28/2024    Procedure: ESOPHAGOGASTRODUODENOSCOPY, WITH BIOPSY;  Surgeon: Estefani Diaz MD;  Location: [facility] OR    EYE SURGERY Left 1995    corneal transplant X 2 on left eye    HERNIA REPAIR  2010    LAPAROSCOPIC APPENDECTOMY  2010    LAPAROSCOPIC CHOLECYSTECTOMY  2010     Current Outpatient Medications   Medication Sig Dispense Refill    FLUoxetine (PROZAC) 10 MG capsule Take 10 mg by mouth daily.      hydrOXYzine HCl (ATARAX) 25 MG tablet TAKE 1 TABLET(25 MG) BY MOUTH THREE TIMES DAILY AS NEEDED FOR ITCHING OR NAUSEA OR VOMITING 60 tablet 1    OLANZapine (ZYPREXA) 2.5 MG tablet Take 2.5 mg by mouth 2 times daily as needed.      OLANZapine (ZYPREXA) 7.5  "MG tablet Take 7.5 mg by mouth at bedtime.      BELBUCA 150 MCG FILM buccal film Place 150 mcg inside cheek every 12 hours.         Allergies   Allergen Reactions    Wellbutrin [Bupropion]         Social History     Tobacco Use    Smoking status: Light Smoker     Current packs/day: 0.00     Average packs/day: 0.5 packs/day for 33.8 years (16.9 ttl pk-yrs)     Types: Cigarettes     Start date: 3/18/1986     Last attempt to quit: 2020     Years since quittin.5    Smokeless tobacco: Former     Quit date: 2001    Tobacco comments:     Working on other mental health issues first   Substance Use Topics    Alcohol use: Yes     Comment: 1-2 drinks per year; no hx of alcohol abuse       History   Drug Use No     Comment: THC gummies for sleep (use for ~2 mo); no hx IVDU             Review of Systems  Constitutional, neuro, ENT, endocrine, pulmonary, cardiac, gastrointestinal, genitourinary, musculoskeletal, integument and psychiatric systems are negative, except as otherwise noted.    Objective    /84   Pulse 77   Temp 98.7  F (37.1  C) (Oral)   Resp 16   Ht 1.727 m (5' 8\")   Wt 93.4 kg (206 lb)   SpO2 97%   BMI 31.32 kg/m     Estimated body mass index is 31.32 kg/m  as calculated from the following:    Height as of this encounter: 1.727 m (5' 8\").    Weight as of this encounter: 93.4 kg (206 lb).  Physical Exam  GENERAL: alert and no distress  EYES: Eyes grossly normal to inspection, PERRL and conjunctivae and sclerae normal  HENT: ear canals and TM's normal, nose and mouth without ulcers or lesions  NECK: no adenopathy, no asymmetry, masses, or scars  RESP: lungs clear to auscultation - no rales, rhonchi or wheezes  CV: regular rate and rhythm, normal S1 S2, no S3 or S4, no murmur, click or rub, no peripheral edema  ABDOMEN: soft, nontender, no hepatosplenomegaly, no masses and bowel sounds normal  MS: no gross musculoskeletal defects noted, no edema  SKIN: no suspicious lesions or rashes  NEURO: " Normal strength and tone, mentation intact and speech normal  PSYCH: mentation appears normal, affect normal/bright    Recent Labs   Lab Test 04/09/25  1003 11/25/24  1553 10/08/24  1009   HGB 16.6 15.7 16.9    177 188   INR 0.90  --  0.99     --  141   POTASSIUM 4.2  --  4.1   CR 1.00  --  1.05   A1C 5.8*  --   --         Diagnostics  Labs pending at this time.  Results will be reviewed when available.  Recent Results (from the past 24 hours)   CBC with platelets and differential    Collection Time: 07/14/25  2:06 PM   Result Value Ref Range    WBC Count 15.2 (H) 4.0 - 11.0 10e3/uL    RBC Count 6.04 (H) 4.40 - 5.90 10e6/uL    Hemoglobin 15.5 13.3 - 17.7 g/dL    Hematocrit 47.0 40.0 - 53.0 %    MCV 78 78 - 100 fL    MCH 25.7 (L) 26.5 - 33.0 pg    MCHC 33.0 31.5 - 36.5 g/dL    RDW 16.0 (H) 10.0 - 15.0 %    Platelet Count 197 150 - 450 10e3/uL    % Neutrophils 74 %    % Lymphocytes 21 %    % Monocytes 5 %    % Eosinophils 1 %    % Basophils 0 %    % Immature Granulocytes 0 %    Absolute Neutrophils 11.2 (H) 1.6 - 8.3 10e3/uL    Absolute Lymphocytes 3.1 0.8 - 5.3 10e3/uL    Absolute Monocytes 0.7 0.0 - 1.3 10e3/uL    Absolute Eosinophils 0.1 0.0 - 0.7 10e3/uL    Absolute Basophils 0.1 0.0 - 0.2 10e3/uL    Absolute Immature Granulocytes 0.0 <=0.4 10e3/uL      EKG: appears normal, NSR, normal axis, normal intervals, no acute ST/T changes c/w ischemia, no LVH by voltage criteria, unchanged from previous tracings    Revised Cardiac Risk Index (RCRI)  The patient has the following serious cardiovascular risks for perioperative complications:   - No serious cardiac risks = 0 points     RCRI Interpretation: 0 points: Class I (very low risk - 0.4% complication rate)        I spent a total of 40 minutes on the day of the visit.   Time spent by me today doing chart review, history and exam, documentation and further activities per the note   The longitudinal plan of care for the diagnosis(es)/condition(s) as  documented were addressed during this visit. Due to the added complexity in care, I will continue to support Chip in the subsequent management and with ongoing continuity of care.    Signed Electronically by: ADRIEN Pineda CNP  A copy of this evaluation report is provided to the requesting physician.        Answers submitted by the patient for this visit:  Patient Health Questionnaire (Submitted on 7/14/2025)  If you checked off any problems, how difficult have these problems made it for you to do your work, take care of things at home, or get along with other people?: Very difficult  PHQ9 TOTAL SCORE: 18

## 2025-07-23 ENCOUNTER — ANESTHESIA EVENT (OUTPATIENT)
Dept: SURGERY | Facility: AMBULATORY SURGERY CENTER | Age: 60
End: 2025-07-23
Payer: COMMERCIAL

## 2025-07-24 ENCOUNTER — ANESTHESIA (OUTPATIENT)
Dept: SURGERY | Facility: AMBULATORY SURGERY CENTER | Age: 60
End: 2025-07-24
Payer: COMMERCIAL

## 2025-07-24 ENCOUNTER — HOSPITAL ENCOUNTER (OUTPATIENT)
Facility: AMBULATORY SURGERY CENTER | Age: 60
End: 2025-07-24
Attending: SURGERY
Payer: COMMERCIAL

## 2025-07-24 VITALS
SYSTOLIC BLOOD PRESSURE: 130 MMHG | TEMPERATURE: 97.7 F | WEIGHT: 206 LBS | HEIGHT: 68 IN | DIASTOLIC BLOOD PRESSURE: 81 MMHG | HEART RATE: 77 BPM | OXYGEN SATURATION: 94 % | BODY MASS INDEX: 31.22 KG/M2 | RESPIRATION RATE: 15 BRPM

## 2025-07-24 DIAGNOSIS — K42.9 UMBILICAL HERNIA WITHOUT OBSTRUCTION AND WITHOUT GANGRENE: Primary | ICD-10-CM

## 2025-07-24 DIAGNOSIS — M62.08 DIASTASIS RECTI: ICD-10-CM

## 2025-07-24 RX ORDER — SODIUM CHLORIDE, SODIUM LACTATE, POTASSIUM CHLORIDE, CALCIUM CHLORIDE 600; 310; 30; 20 MG/100ML; MG/100ML; MG/100ML; MG/100ML
INJECTION, SOLUTION INTRAVENOUS CONTINUOUS
Status: DISCONTINUED | OUTPATIENT
Start: 2025-07-24 | End: 2025-07-24 | Stop reason: HOSPADM

## 2025-07-24 RX ORDER — LIDOCAINE HYDROCHLORIDE 20 MG/ML
INJECTION, SOLUTION INFILTRATION; PERINEURAL PRN
Status: DISCONTINUED | OUTPATIENT
Start: 2025-07-24 | End: 2025-07-24

## 2025-07-24 RX ORDER — DEXAMETHASONE SODIUM PHOSPHATE 10 MG/ML
4 INJECTION, SOLUTION INTRAMUSCULAR; INTRAVENOUS
Status: DISCONTINUED | OUTPATIENT
Start: 2025-07-24 | End: 2025-07-24 | Stop reason: HOSPADM

## 2025-07-24 RX ORDER — ONDANSETRON 2 MG/ML
4 INJECTION INTRAMUSCULAR; INTRAVENOUS EVERY 30 MIN PRN
Status: DISCONTINUED | OUTPATIENT
Start: 2025-07-24 | End: 2025-07-24 | Stop reason: HOSPADM

## 2025-07-24 RX ORDER — BUPIVACAINE HYDROCHLORIDE AND EPINEPHRINE 5; 5 MG/ML; UG/ML
INJECTION, SOLUTION EPIDURAL; INTRACAUDAL; PERINEURAL PRN
Status: DISCONTINUED | OUTPATIENT
Start: 2025-07-24 | End: 2025-07-24 | Stop reason: HOSPADM

## 2025-07-24 RX ORDER — ACETAMINOPHEN 325 MG/1
975 TABLET ORAL ONCE
Status: COMPLETED | OUTPATIENT
Start: 2025-07-24 | End: 2025-07-24

## 2025-07-24 RX ORDER — KETAMINE HYDROCHLORIDE 10 MG/ML
INJECTION INTRAMUSCULAR; INTRAVENOUS PRN
Status: DISCONTINUED | OUTPATIENT
Start: 2025-07-24 | End: 2025-07-24

## 2025-07-24 RX ORDER — ONDANSETRON 4 MG/1
4 TABLET, ORALLY DISINTEGRATING ORAL EVERY 30 MIN PRN
Status: DISCONTINUED | OUTPATIENT
Start: 2025-07-24 | End: 2025-07-24 | Stop reason: HOSPADM

## 2025-07-24 RX ORDER — SODIUM CHLORIDE, SODIUM LACTATE, POTASSIUM CHLORIDE, CALCIUM CHLORIDE 600; 310; 30; 20 MG/100ML; MG/100ML; MG/100ML; MG/100ML
INJECTION, SOLUTION INTRAVENOUS CONTINUOUS PRN
Status: DISCONTINUED | OUTPATIENT
Start: 2025-07-24 | End: 2025-07-24

## 2025-07-24 RX ORDER — ONDANSETRON 2 MG/ML
INJECTION INTRAMUSCULAR; INTRAVENOUS PRN
Status: DISCONTINUED | OUTPATIENT
Start: 2025-07-24 | End: 2025-07-24

## 2025-07-24 RX ORDER — NALOXONE HYDROCHLORIDE 0.4 MG/ML
0.1 INJECTION, SOLUTION INTRAMUSCULAR; INTRAVENOUS; SUBCUTANEOUS
Status: ACTIVE | OUTPATIENT
Start: 2025-07-24

## 2025-07-24 RX ORDER — OXYCODONE HYDROCHLORIDE 5 MG/1
5 TABLET ORAL
Status: COMPLETED | OUTPATIENT
Start: 2025-07-24 | End: 2025-07-24

## 2025-07-24 RX ORDER — DEXAMETHASONE SODIUM PHOSPHATE 4 MG/ML
INJECTION, SOLUTION INTRA-ARTICULAR; INTRALESIONAL; INTRAMUSCULAR; INTRAVENOUS; SOFT TISSUE PRN
Status: DISCONTINUED | OUTPATIENT
Start: 2025-07-24 | End: 2025-07-24

## 2025-07-24 RX ORDER — KETOROLAC TROMETHAMINE 30 MG/ML
INJECTION, SOLUTION INTRAMUSCULAR; INTRAVENOUS PRN
Status: DISCONTINUED | OUTPATIENT
Start: 2025-07-24 | End: 2025-07-24

## 2025-07-24 RX ORDER — ONDANSETRON 4 MG/1
4 TABLET, ORALLY DISINTEGRATING ORAL EVERY 30 MIN PRN
Status: ACTIVE | OUTPATIENT
Start: 2025-07-24

## 2025-07-24 RX ORDER — FENTANYL CITRATE 50 UG/ML
50 INJECTION, SOLUTION INTRAMUSCULAR; INTRAVENOUS EVERY 5 MIN PRN
Status: DISCONTINUED | OUTPATIENT
Start: 2025-07-24 | End: 2025-07-24 | Stop reason: HOSPADM

## 2025-07-24 RX ORDER — PROPOFOL 10 MG/ML
INJECTION, EMULSION INTRAVENOUS PRN
Status: DISCONTINUED | OUTPATIENT
Start: 2025-07-24 | End: 2025-07-24

## 2025-07-24 RX ORDER — CEFAZOLIN SODIUM 2 G/50ML
2 SOLUTION INTRAVENOUS
Status: COMPLETED | OUTPATIENT
Start: 2025-07-24 | End: 2025-07-24

## 2025-07-24 RX ORDER — LIDOCAINE 40 MG/G
CREAM TOPICAL
Status: DISCONTINUED | OUTPATIENT
Start: 2025-07-24 | End: 2025-07-24 | Stop reason: HOSPADM

## 2025-07-24 RX ORDER — OXYCODONE HYDROCHLORIDE 5 MG/1
5-10 TABLET ORAL EVERY 4 HOURS PRN
Qty: 10 TABLET | Refills: 0 | Status: SHIPPED | OUTPATIENT
Start: 2025-07-24

## 2025-07-24 RX ORDER — ONDANSETRON 2 MG/ML
4 INJECTION INTRAMUSCULAR; INTRAVENOUS EVERY 30 MIN PRN
Status: ACTIVE | OUTPATIENT
Start: 2025-07-24

## 2025-07-24 RX ORDER — HYDROMORPHONE HYDROCHLORIDE 1 MG/ML
0.2 INJECTION, SOLUTION INTRAMUSCULAR; INTRAVENOUS; SUBCUTANEOUS EVERY 5 MIN PRN
Status: DISCONTINUED | OUTPATIENT
Start: 2025-07-24 | End: 2025-07-24 | Stop reason: HOSPADM

## 2025-07-24 RX ORDER — OXYCODONE HYDROCHLORIDE 5 MG/1
5 TABLET ORAL
Status: ACTIVE | OUTPATIENT
Start: 2025-07-24

## 2025-07-24 RX ORDER — FENTANYL CITRATE 50 UG/ML
INJECTION, SOLUTION INTRAMUSCULAR; INTRAVENOUS PRN
Status: DISCONTINUED | OUTPATIENT
Start: 2025-07-24 | End: 2025-07-24

## 2025-07-24 RX ORDER — PROPOFOL 10 MG/ML
INJECTION, EMULSION INTRAVENOUS CONTINUOUS PRN
Status: DISCONTINUED | OUTPATIENT
Start: 2025-07-24 | End: 2025-07-24

## 2025-07-24 RX ORDER — OXYCODONE HYDROCHLORIDE 5 MG/1
10 TABLET ORAL
Status: ACTIVE | OUTPATIENT
Start: 2025-07-24

## 2025-07-24 RX ORDER — CEFAZOLIN SODIUM 2 G/50ML
2 SOLUTION INTRAVENOUS SEE ADMIN INSTRUCTIONS
Status: DISCONTINUED | OUTPATIENT
Start: 2025-07-24 | End: 2025-07-24 | Stop reason: HOSPADM

## 2025-07-24 RX ORDER — FENTANYL CITRATE 50 UG/ML
25 INJECTION, SOLUTION INTRAMUSCULAR; INTRAVENOUS EVERY 5 MIN PRN
Status: DISCONTINUED | OUTPATIENT
Start: 2025-07-24 | End: 2025-07-24 | Stop reason: HOSPADM

## 2025-07-24 RX ORDER — NALOXONE HYDROCHLORIDE 0.4 MG/ML
0.1 INJECTION, SOLUTION INTRAMUSCULAR; INTRAVENOUS; SUBCUTANEOUS
Status: DISCONTINUED | OUTPATIENT
Start: 2025-07-24 | End: 2025-07-24 | Stop reason: HOSPADM

## 2025-07-24 RX ORDER — HYDROMORPHONE HYDROCHLORIDE 1 MG/ML
0.4 INJECTION, SOLUTION INTRAMUSCULAR; INTRAVENOUS; SUBCUTANEOUS EVERY 5 MIN PRN
Status: DISCONTINUED | OUTPATIENT
Start: 2025-07-24 | End: 2025-07-24 | Stop reason: HOSPADM

## 2025-07-24 RX ORDER — DEXAMETHASONE SODIUM PHOSPHATE 10 MG/ML
4 INJECTION, SOLUTION INTRAMUSCULAR; INTRAVENOUS
Status: ACTIVE | OUTPATIENT
Start: 2025-07-24

## 2025-07-24 RX ADMIN — Medication 50 MG: at 11:28

## 2025-07-24 RX ADMIN — ONDANSETRON 4 MG: 2 INJECTION INTRAMUSCULAR; INTRAVENOUS at 12:41

## 2025-07-24 RX ADMIN — FENTANYL CITRATE 50 MCG: 50 INJECTION, SOLUTION INTRAMUSCULAR; INTRAVENOUS at 11:12

## 2025-07-24 RX ADMIN — PROPOFOL 200 MCG/KG/MIN: 10 INJECTION, EMULSION INTRAVENOUS at 11:20

## 2025-07-24 RX ADMIN — KETOROLAC TROMETHAMINE 30 MG: 30 INJECTION, SOLUTION INTRAMUSCULAR; INTRAVENOUS at 12:49

## 2025-07-24 RX ADMIN — FENTANYL CITRATE 50 MCG: 50 INJECTION, SOLUTION INTRAMUSCULAR; INTRAVENOUS at 11:39

## 2025-07-24 RX ADMIN — Medication 0.5 MG: at 12:51

## 2025-07-24 RX ADMIN — Medication 0.5 MG: at 12:33

## 2025-07-24 RX ADMIN — CEFAZOLIN SODIUM 2 G: 2 SOLUTION INTRAVENOUS at 11:15

## 2025-07-24 RX ADMIN — KETAMINE HYDROCHLORIDE 25 MG: 10 INJECTION INTRAMUSCULAR; INTRAVENOUS at 11:30

## 2025-07-24 RX ADMIN — PROPOFOL 200 MG: 10 INJECTION, EMULSION INTRAVENOUS at 11:19

## 2025-07-24 RX ADMIN — DEXAMETHASONE SODIUM PHOSPHATE 4 MG: 4 INJECTION, SOLUTION INTRA-ARTICULAR; INTRALESIONAL; INTRAMUSCULAR; INTRAVENOUS; SOFT TISSUE at 11:12

## 2025-07-24 RX ADMIN — ACETAMINOPHEN 975 MG: 325 TABLET ORAL at 10:12

## 2025-07-24 RX ADMIN — Medication 20 MG: at 12:13

## 2025-07-24 RX ADMIN — KETAMINE HYDROCHLORIDE 25 MG: 10 INJECTION INTRAMUSCULAR; INTRAVENOUS at 12:07

## 2025-07-24 RX ADMIN — OXYCODONE HYDROCHLORIDE 5 MG: 5 TABLET ORAL at 13:21

## 2025-07-24 RX ADMIN — SODIUM CHLORIDE, SODIUM LACTATE, POTASSIUM CHLORIDE, CALCIUM CHLORIDE: 600; 310; 30; 20 INJECTION, SOLUTION INTRAVENOUS at 11:09

## 2025-07-24 RX ADMIN — LIDOCAINE HYDROCHLORIDE 100 MG: 20 INJECTION, SOLUTION INFILTRATION; PERINEURAL at 11:19

## 2025-07-24 ASSESSMENT — COPD QUESTIONNAIRES: COPD: 0

## 2025-07-24 ASSESSMENT — LIFESTYLE VARIABLES: TOBACCO_USE: 1

## 2025-07-24 ASSESSMENT — ENCOUNTER SYMPTOMS: ORTHOPNEA: 0

## 2025-07-24 NOTE — OP NOTE
"Date of Service: 7/24/2025     STAFF SURGEON:  Dov Amos MD     ASSISTANT:  None.     PREOPERATIVE DIAGNOSIS: Umbilical hernia and diastases rectus     POSTOPERATIVE DIAGNOSIS:  Same     NAME OF PROCEDURE(S): Robotic assisted laparoscopic ventral hernia repair (etep-rr)     INDICATIONS FOR PROCEDURE:  The patient is a 59-year-old male with abdominal bulge and chronic abdominal pain.  He is found to have small umbilical hernia associate with a diastases rectus.  It was unclear if these were cause of pain but no other cause had been found with endoscopy and imaging.  I offered minimally invasive approach to hernia repair combined with imbrication of the diastases.  Risks, benefits and alternatives discussed and consent obtained.     EBL: 20 cc    ANESTHESIA: General    COMPLICATIONS: None     DRAINS:  None.     SPECIMENS:   * No specimens in log *     IMPLANTS:   Implant Name Type Inv. Item Serial No.  Lot No. LRB No. Used Action   MESH SURG PROGRIP PP [id number] - [id number] Mesh MESH SURG PROGRIP PP [id number]  [employer] [id number] N/A 1 Implanted   Turned to place as a \"sánchez\" 21.2 cm long corner to corner with the width then trimmed down to 12 cm     OPERATIVE FINDINGS: Umbilical hernia defect measured 3 x 3 cm and contained fatty tissue at time of operation, easily reducible.  Mesh placed along the long axis corner to corner in order to gain some extra length for additional reinforcement along the imbricated diastases superiorly in a retrorectus position.     PROCEDURE DETAIL:  Patient was brought to the operating room and underwent general anesthesia with endotracheal intubation.  Patient position supine with bed flexed.  Preoperative antibiotics given in addition to Lovenox and SCDs for DVT prophylaxis.  Timeout performed.   I began with a small incision in the left upper quadrant at the costal margin approximately mid rectus for 5 mm Optiview port access into the left retrorectus " space.  This was attached to insufflation and I began initial dissection of the retrorectus space with the laparoscope until I had adequate room to begin placing the robotic ports in the left abdomen.  An 8 mm robotic port was placed in the left upper and lower abdomen with a 12 mm port in the mid abdomen.  An 8 mm robotic port was nested within this larger port and the robot brought in and docked.   I moved over to the surgeon console and began with further dissection of the retrorectus space until the medial border of the posterior sheath was well cleared.  I then divided the medial border of the rectus sheath with scissors along the length of the rectus past the arcuate line.  I then dissected across midline preperitoneally to identify the posterior sheath on the right.  This was incised to join the 2 spaces together.  I continued this division along the length of the right sheath reducing hernia contents as encountered.  I then dissected across under the right rectus until I had the entire retrorectus space fully freed.   I then closed the anterior fascia including hernia defect with running #1 strata fix PDS type suture.  I measured the area of repair and selected the trimmed 15 x 15 mesh which was inserted in the abdomen and deployed up to the abdominal wall as noted above.  At this point the robot was undocked and I moved back to patient bedside.   The ruler and needle were then removed from the abdomen.  The ports were removed and the abdomen desufflated.  Skin incisions closed with 4-0 Monocryl and covered with skin glue.  Incisions were injected with half percent Marcaine with epinephrine 1-200,000.  Final counts complete.  Patient tolerated the procedure well and was discharged from the operating room to PACU in a stable condition.       Dov Amos MD

## 2025-07-24 NOTE — ANESTHESIA PROCEDURE NOTES
Airway       Patient location during procedure: OR       Procedure Start/Stop Times: 7/24/2025 11:23 AM  Staff -        Performed By: ELIZABETH  Consent for Airway        Urgency: elective  Indications and Patient Condition       Indications for airway management: jayson-procedural       Induction type:RSI       Mask difficulty assessment: 0 - not attempted    Final Airway Details       Final airway type: endotracheal airway       Successful airway: ETT - single and Oral  Endotracheal Airway Details        ETT size (mm): 7.5       Cuffed: yes       Cuff volume (mL): 7       Successful intubation technique: video laryngoscopy (First attempt by ELIZABETH FRYE, unable to intubate. Switched to glide)       VL Blade Size: Glidescope 3       Grade View of Cords: 1       Adjucts: stylet       Position: Right       Measured from: gums/teeth       Secured at (cm): 23       Bite block used: None    Post intubation assessment        Placement verified by: capnometry, equal breath sounds and chest rise        Number of attempts at approach: 2       Secured with: commercial tube bocanegra and tape       Ease of procedure: easy       Dentition: Unchanged and Intact    Medication(s) Administered   Medication Administration Time: 7/24/2025 11:23 AM

## 2025-07-24 NOTE — ANESTHESIA POSTPROCEDURE EVALUATION
Patient: Pop Eaton III    Procedure: Procedure(s):  HERNIORRHAPHY, VENTRAL, ROBOT-ASSISTED       Anesthesia Type:  General    Note:  Disposition: Outpatient   Postop Pain Control: Uneventful            Sign Out: Well controlled pain   PONV: No   Neuro/Psych: Uneventful            Sign Out: Acceptable/Baseline neuro status   Airway/Respiratory: Uneventful            Sign Out: Acceptable/Baseline resp. status   CV/Hemodynamics: Uneventful            Sign Out: Acceptable CV status; No obvious hypovolemia; No obvious fluid overload   Other NRE:    DID A NON-ROUTINE EVENT OCCUR?            Last vitals:  Vitals Value Taken Time   /81 07/24/25 14:30   Temp 36.8  C (98.2  F) 07/24/25 13:30   Pulse 85 07/24/25 13:30   Resp 15 07/24/25 14:30   SpO2 94 % 07/24/25 14:30       Electronically Signed By: Shirin Sethi MD  July 24, 2025  2:30 PM

## 2025-07-24 NOTE — ANESTHESIA CARE TRANSFER NOTE
Patient: Pop Eaton III    Procedure: Procedure(s):  HERNIORRHAPHY, VENTRAL, ROBOT-ASSISTED       Diagnosis: Umbilical hernia without obstruction and without gangrene [K42.9]  Diastasis recti [M62.08]  Diagnosis Additional Information: No value filed.    Anesthesia Type:   No value filed.     Note:    Oropharynx: oropharynx clear of all foreign objects and spontaneously breathing  Level of Consciousness: awake and drowsy  Oxygen Supplementation: face mask  Level of Supplemental Oxygen (L/min / FiO2): 4  Independent Airway: airway patency satisfactory and stable  Dentition: dentition unchanged  Vital Signs Stable: post-procedure vital signs reviewed and stable  Report to RN Given: handoff report given  Patient transferred to: PACU  Comments: Report given to PACU RN. Pt denies nausea/pain at this time   Handoff Report: Identifed the Patient, Identified the Reponsible Provider, Reviewed the pertinent medical history, Discussed the surgical course, Reviewed Intra-OP anesthesia mangement and issues during anesthesia, Set expectations for post-procedure period and Allowed opportunity for questions and acknowledgement of understanding      Vitals:  Vitals Value Taken Time   /87 07/24/25 13:08   Temp 36.4  C (97.52  F) 07/24/25 13:12   Pulse 95 07/24/25 13:12   Resp 14 07/24/25 13:12   SpO2 94 % 07/24/25 13:12   Vitals shown include unfiled device data.    Electronically Signed By: ADRIEN Vigil CRNA  July 24, 2025  1:12 PM

## 2025-07-24 NOTE — DISCHARGE INSTRUCTIONS
How to Relieve Pain After Your Robotic or Laparoscopic Abdominal Surgery    After your surgery, you may have different types of pain.  It's normal to have pain near your incisions, but you may also feel bloated or have pain in different areas of your body, especially your shoulders.  This is from the gas that was put into your abdomen during your surgery.  The gas makes room for your surgeon to see, but it also puts pressure on the inside of your abdomen and can move to other areas.    The referred pain to your shoulders and bloating discomfort can improve with frequent short, non-strenuous walks (inside your home).  Try to start walking within 1 to 2 hours after surgery.  You can also place a hot pack or heating pad on the painful area (don't put it directly on your skin)  Lastly, the bloat and referred pain will dissipate with time as the body reabsorbs the gas that was placed in your abdomen.    If your pain isn't controlled after trying these things and taking pain medication, call your doctor.    [facility]  Home Care Following Anesthesia  For 24 hours after surgery:  Get plenty of rest.  A responsible adult must stay with you for at least 24 hours after you leave the surgery center.  Do not drive or use heavy equipment.  If you have weakness or tingling, don't drive or use heavy equipment until this feeling goes away.   Do not drink alcohol.   Avoid strenuous or risky activities.  Ask for help when climbing stairs.  You may feel lightheaded.  IF so, sit for a few minutes before standing.  Have someone help you get up.   If you have nausea (feel sick to your stomach): Drink only clear liquids such as apple juice, ginger ale, broth or 7-Up.  Rest may also help.  Be sure to drink enough fluids.  Move to a regular diet as you feel able.   You may have a slight fever.  Call the doctor if your fever is over 100 F (37.7 C) (taken under the tongue) or lasts longer than 24  "hours.  You may have a dry mouth, a sore throat, muscle aches or trouble sleeping. These should go away after 24 hours.  Do not make important or legal decisions.   It is recommended to avoid smoking.        Today you received a Marcaine or bupivacaine block to numb the nerves near your surgery site.  This is a block using local anesthetic or \"numbing\" medication injected around the nerves to anesthetize or \"numb\" the area supplied by those nerves.  This block is injected into the muscle layer near your surgical site.  The medication may numb the location where you had surgery for 6-18 hours, but may last up to 24 hours.  If your surgical site is an arm or leg you should be careful with your affected limb, since it is possible to injure your limb without being aware of it due to the numbing.  Until full feeling returns, you should guard against bumping or hitting your limb, and avoid extreme hot or cold temperatures on the skin.  As the block wears off, the feeling will return as a tingling or prickly sensation near your surgical site.  You will experience more discomfort from your incision as the feeling returns.  You may want to take a pain pill (a narcotic or Tylenol if this was prescribed by your surgeon) when you start to experience mild pain before the pain beccomes more severe.  If your pain medications do not control your pain you should notifiy your surgeon.    Tips for taking pain medications  To get the best pain relief possible, remember these points:  Take pain medications as directed, before pain becomes severe.  Pain medication can upset your stomach: taking it with food may help.  Constipation is a common side effect of pain medication. Drink plenty of  fluids.  Eat foods high in fiber. Take a stool softener if recommended by your doctor or pharmacist.  Do not drink alcohol, drive or operate machinery while taking pain medications.  Ask about other ways to control pain, such as with heat, ice or " relaxation.    Tylenol/Acetaminophen Consumption    If you feel your pain relief is insufficient, you may take Tylenol/Acetaminophen in addition to your narcotic pain medication.   Be careful not to exceed 4,000 mg of Tylenol/Acetaminophen in a 24 hour period from all sources.  If you are taking extra strength Tylenol/acetaminophen (500 mg), the maximum dose is 8 tablets in 24 hours.  If you are taking regular strength acetaminophen (325 mg), the maximum dose is 12 tablets in 24 hours.    Tylenol 975 mg given at 10:15 AM.   Ok to take more after 4:15 AM.    Call a doctor for any of the following:  Signs of infection (fever, growing tenderness at the surgery site, a large amount of drainage or bleeding, severe pain, foul-smelling drainage, redness, swelling).  It has been over 8 to 10 hours since surgery and you are still not able to urinate (pass water).  Headache for over 24 hours.  Numbness, tingling or weakness the day after surgery (if you had spinal anesthesia).  Signs of Covid-19 infection (temperature over 100 degrees, shortness of breath, cough, loss of taste/smell, generalized body aches, persistent headache, chills, sore throat, nausea/vomiting/diarrhea)    Your doctor is:       Dr. Dov Amos, General Surgery: [phone]               After hours and weekends call the hospital @ [phone] and ask for the resident on call for:  General Surgery  For emergency care, call the:  [facility]:  [phone] (TTY for hearing impaired: [phone])

## 2025-07-24 NOTE — ANESTHESIA PREPROCEDURE EVALUATION
Anesthesia Pre-Procedure Evaluation    Patient: Pop Eaton III   MRN: [id number] : 1965          Procedure : Procedure(s):  HERNIORRHAPHY, VENTRAL, ROBOT-ASSISTED         Past Medical History:   Diagnosis Date    Acute medial meniscus tear of right knee 10/14/2016    Biceps tendinitis of left shoulder 2021    Biliary colic 2011    Cancer (H) 2015    not sure of date but had mole that was cancerous removed.    Depressive disorder 2020      Past Surgical History:   Procedure Laterality Date    ARTHROSCOPY KNEE RT/LT Left     meniscal tear     ARTHROSCOPY KNEE RT/LT Right 10/2016    medial meniscal tear     BIOPSY      Right bicep area    COLONOSCOPY  2017    Q 3 years for polyps    COLONOSCOPY N/A 2023    Procedure: COLONOSCOPY, WITH POLYPECTOMY;  Surgeon: Gulshan Braden MD;  Location: [facility]    COLONOSCOPY WITH CO2 INSUFFLATION N/A 02/15/2017    Procedure: COLONOSCOPY WITH CO2 INSUFFLATION;  Surgeon: Galileo Lezama DO;  Location: [facility]    ESOPHAGOSCOPY, GASTROSCOPY, DUODENOSCOPY (EGD), COMBINED N/A 2024    Procedure: ESOPHAGOGASTRODUODENOSCOPY, WITH BIOPSY;  Surgeon: Estefani Diaz MD;  Location: [facility]    EYE SURGERY Left     corneal transplant X 2 on left eye    HERNIA REPAIR  2010    LAPAROSCOPIC APPENDECTOMY  2010    LAPAROSCOPIC CHOLECYSTECTOMY        Allergies   Allergen Reactions    Wellbutrin [Bupropion]       Social History     Tobacco Use    Smoking status: Light Smoker     Current packs/day: 0.00     Average packs/day: 0.5 packs/day for 33.8 years (16.9 ttl pk-yrs)     Types: Cigarettes     Start date: 3/18/1986     Last attempt to quit: 2020     Years since quittin.5    Smokeless tobacco: Former     Quit date: 2001    Tobacco comments:     Working on other mental health issues first   Substance Use Topics    Alcohol use: Yes     Comment: 1-2 drinks per year; no hx of alcohol abuse      Wt Readings from Last 1  "Encounters:   07/24/25 93.4 kg (206 lb)        Anesthesia Evaluation   Pt has had prior anesthetic. Type: General and MAC.    No history of anesthetic complications       ROS/MED HX  ENT/Pulmonary:     (+)                tobacco use, Current use,                    (-) asthma, COPD and recent URI   Neurologic:     (+)    peripheral neuropathy (left hand, 1st 3 fingers. No symptoms with full neck extension),                            Cardiovascular:    (-) CARDOZA, orthopnea/PND and syncope   METS/Exercise Tolerance: >4 METS    Hematologic:       Musculoskeletal:       GI/Hepatic: Comment: 5/2024 gastric emptying study: \"IMPRESSION: Normal  gastric emptying (see normal ranges below).\" Frequent emesis, none today.   (-) GERD   Renal/Genitourinary:       Endo:       Psychiatric/Substance Use:     (+) psychiatric history depression   Recreational drug usage: Cannabis.    Infectious Disease:       Malignancy:       Other:              Physical Exam  Airway  Mallampati: II  TM distance: >3 FB  Neck ROM: full  Mouth opening: >= 4 cm    Cardiovascular   Rhythm: regular  Rate: normal rate     Dental       Pulmonary Breath sounds clear to auscultation        Neurological   He appears awake, alert and oriented x3.    Other Findings       OUTSIDE LABS:  CBC:   Lab Results   Component Value Date    WBC 15.2 (H) 07/14/2025    WBC 13.5 (H) 04/09/2025    HGB 15.5 07/14/2025    HGB 16.6 04/09/2025    HCT 47.0 07/14/2025    HCT 48.8 04/09/2025     07/14/2025     04/09/2025     BMP:   Lab Results   Component Value Date     07/14/2025     04/09/2025    POTASSIUM 4.1 07/14/2025    POTASSIUM 4.2 04/09/2025    CHLORIDE 105 07/14/2025    CHLORIDE 104 04/09/2025    CO2 24 07/14/2025    CO2 23 04/09/2025    BUN 10.1 07/14/2025    BUN 12.2 04/09/2025    CR 1.02 07/14/2025    CR 1.00 04/09/2025    GLC 91 07/14/2025     (H) 04/09/2025     COAGS:   Lab Results   Component Value Date    INR 0.90 04/09/2025     POC: " "No results found for: \"BGM\", \"HCG\", \"HCGS\"  HEPATIC:   Lab Results   Component Value Date    ALBUMIN 4.2 07/14/2025    PROTTOTAL 6.8 07/14/2025    ALT 31 07/14/2025    AST 34 07/14/2025    ALKPHOS 102 07/14/2025    BILITOTAL 0.2 07/14/2025     OTHER:   Lab Results   Component Value Date    A1C 5.8 (H) 04/09/2025    JANE 9.1 07/14/2025    MAG 2.5 (H) 07/30/2018    LIPASE 23 03/27/2024    AMYLASE 51 03/27/2024    TSH 1.91 04/17/2024    CRP 3.1 05/07/2019       Anesthesia Plan    ASA Status:  2      NPO Status: NPO Appropriate   Anesthesia Type: General.  Airway: oral.  Induction: intravenous.  Maintenance: Balanced.   Techniques and Equipment:       - Monitoring Plan: standard ASA monitoring     Consents    Anesthesia Plan(s) and associated risks, benefits, and realistic alternatives discussed. Questions answered and patient/representative(s) expressed understanding.     - Discussed: anesthesiologist     - Discussed with:  Patient               Postoperative Care         Comments:    Other Comments: Discussed risks of general anesthesia, including aspiration pneumonia, sore throat/hoarse voice, abrasions/damage to lips/tongue/teeth, nausea, rare complications (including medication reactions, cardiac, pulmonary, hypoxia/low oxygen). Ensured understanding, invited questions and all questions were answered. Patient wishes to proceed.               Shirin Sethi MD    I have reviewed the pertinent notes and labs in the chart from the past 30 days and (re)examined the patient.  Any updates or changes from those notes are reflected in this note.    Clinically Significant Risk Factors Present on Admission                             # Obesity: Estimated body mass index is 31.32 kg/m  as calculated from the following:    Height as of this encounter: 1.727 m (5' 8\").    Weight as of this encounter: 93.4 kg (206 lb).                    "

## 2025-07-24 NOTE — INTERVAL H&P NOTE
"I have reviewed the surgical (or preoperative) H&P that is linked to this encounter, and examined the patient. There are no significant changes    Clinical Conditions Present on Arrival:  Clinically Significant Risk Factors Present on Admission       # Obesity: Estimated body mass index is 31.32 kg/m  as calculated from the following:    Height as of this encounter: 1.727 m (5' 8\").    Weight as of this encounter: 93.4 kg (206 lb).       "

## 2025-07-25 ENCOUNTER — TELEPHONE (OUTPATIENT)
Dept: SURGERY | Facility: CLINIC | Age: 60
End: 2025-07-25
Payer: COMMERCIAL

## 2025-07-25 NOTE — TELEPHONE ENCOUNTER
[facility] Call Center    Phone Message    May a detailed message be left on voicemail: yes     Reason for Call: Medication Refill Request    Has the patient contacted the pharmacy for the refill? Yes   Name of medication being requested: oxyCODONE (ROXICODONE) tablet 5 mg   Provider who prescribed the medication: Dr. Amos  Pharmacy:    [facility] #[id number] [address]    Date medication is needed: ASAP       Action Taken: Message routed to:  Other: [facility]    Travel Screening: Not Applicable     Date of Service:

## 2025-07-25 NOTE — TELEPHONE ENCOUNTER
Called pt to discuss how many tabs he has left. He has 4 tbs left. He is using tylenol, IBU, ice, and resting as much as possible. Will fwd to MD for refill prior to weekend    BOSSMAN Arrington RN 7/25/2025 11:09 AM

## 2025-07-28 ENCOUNTER — TELEPHONE (OUTPATIENT)
Dept: SURGERY | Facility: CLINIC | Age: 60
End: 2025-07-28
Payer: COMMERCIAL

## 2025-07-28 DIAGNOSIS — G89.18 ACUTE POST-OPERATIVE PAIN: Primary | ICD-10-CM

## 2025-07-28 RX ORDER — OXYCODONE HYDROCHLORIDE 5 MG/1
5 TABLET ORAL EVERY 6 HOURS PRN
Qty: 10 TABLET | Refills: 0 | Status: SHIPPED | OUTPATIENT
Start: 2025-07-28 | End: 2025-07-30

## 2025-07-28 NOTE — TELEPHONE ENCOUNTER
[facility] Center    Phone Message    May a detailed message be left on voicemail: yes     Reason for Call: Medication Refill Request      Name of medication being requested: oxyCODONE (ROXICODONE) 5 MG tablet     Provider who prescribed the medication: Dr. Dov Amos    Pharmacy: [facility] #[id number]    Date medication is needed: ASAP.  Patient is having a lot of pain.        NOTE:  Patient would like a call if the med is going to be filled or not - either way.    Action Taken: Routed to: [facility]    Travel Screening: Not Applicable

## 2025-07-28 NOTE — TELEPHONE ENCOUNTER
Patient calling again, this was requested 7/25 in TE.     Routing to provider.    MOHSEN GaitanN, RN, PHN 7/28/2025 10:17 AM

## 2025-07-28 NOTE — TELEPHONE ENCOUNTER
Patient called again today 7/28/25 and rx was sent. See 7/28/25 TE.    MOHSEN GaitanN, RN, PHN 7/28/2025 12:21 PM

## 2025-07-30 DIAGNOSIS — G89.18 ACUTE POST-OPERATIVE PAIN: ICD-10-CM

## 2025-07-30 RX ORDER — OXYCODONE HYDROCHLORIDE 5 MG/1
5 TABLET ORAL EVERY 6 HOURS PRN
Qty: 10 TABLET | Refills: 0 | Status: SHIPPED | OUTPATIENT
Start: 2025-07-30

## 2025-07-30 NOTE — TELEPHONE ENCOUNTER
[facility] Call Center    Phone Message    May a detailed message be left on voicemail: yes     Reason for Call: Medication Refill Request    Has the patient contacted the pharmacy for the refill? Yes   Name of medication being requested: Oxycodone 5 mg  Provider who prescribed the medication: Dr. Amos   Pharmacy: [address]    Date medication is needed: ASAP       Action Taken: Message routed to:  Other: [facility]     Travel Screening: Not Applicable     Date of Service:

## 2025-07-30 NOTE — TELEPHONE ENCOUNTER
Robotic assisted laparoscopic ventral hernia repair (etep-rr) performed on 07/24/2025 by Dr. Amos.

## 2025-08-04 ENCOUNTER — MYC REFILL (OUTPATIENT)
Dept: SURGERY | Facility: CLINIC | Age: 60
End: 2025-08-04
Payer: COMMERCIAL

## 2025-08-04 DIAGNOSIS — G89.18 ACUTE POST-OPERATIVE PAIN: ICD-10-CM

## 2025-08-05 RX ORDER — OXYCODONE HYDROCHLORIDE 5 MG/1
5 TABLET ORAL EVERY 6 HOURS PRN
Qty: 10 TABLET | Refills: 0 | Status: SHIPPED | OUTPATIENT
Start: 2025-08-05

## 2025-08-11 ENCOUNTER — OFFICE VISIT (OUTPATIENT)
Dept: SURGERY | Facility: CLINIC | Age: 60
End: 2025-08-11
Payer: COMMERCIAL

## 2025-08-11 VITALS
BODY MASS INDEX: 30.1 KG/M2 | HEART RATE: 75 BPM | HEIGHT: 68 IN | SYSTOLIC BLOOD PRESSURE: 147 MMHG | DIASTOLIC BLOOD PRESSURE: 89 MMHG | WEIGHT: 198.6 LBS

## 2025-08-11 DIAGNOSIS — Z98.890 S/P REPAIR OF VENTRAL HERNIA: Primary | ICD-10-CM

## 2025-08-11 DIAGNOSIS — Z87.19 S/P REPAIR OF VENTRAL HERNIA: Primary | ICD-10-CM

## 2025-08-11 PROCEDURE — 3077F SYST BP >= 140 MM HG: CPT | Performed by: SURGERY

## 2025-08-11 PROCEDURE — 3079F DIAST BP 80-89 MM HG: CPT | Performed by: SURGERY

## 2025-08-11 PROCEDURE — 99212 OFFICE O/P EST SF 10 MIN: CPT | Performed by: SURGERY

## (undated) DEVICE — KIT ENDO TURNOVER/PROCEDURE CARRY-ON 101822

## (undated) DEVICE — KIT ENDO FIRST STEP DISINFECTANT 200ML W/POUCH EP-4

## (undated) DEVICE — ENDO SNARE EXACTO COLD 9MM LOOP 2.4MMX230CM 00711115

## (undated) DEVICE — SYR 50ML SLIP TIP W/O NDL 309654

## (undated) DEVICE — SOL WATER IRRIG 500ML BOTTLE 2F7113

## (undated) DEVICE — SUCTION MANIFOLD NEPTUNE 2 SYS 4 PORT 0702-020-000

## (undated) DEVICE — TUBING SUCTION 10'X3/16" N510

## (undated) DEVICE — GOWN IMPERVIOUS 2XL BLUE

## (undated) DEVICE — JELLY LUBRICATING SURGILUBE 2OZ TUBE

## (undated) DEVICE — ENDO BITE BLOCK ADULT OMNI-BLOC

## (undated) DEVICE — SOL WATER IRRIG 1000ML BOTTLE 2F7114

## (undated) DEVICE — TUBING SUCTION 6"X3/16" N56A

## (undated) RX ORDER — PROPOFOL 10 MG/ML
INJECTION, EMULSION INTRAVENOUS
Status: DISPENSED
Start: 2025-07-24

## (undated) RX ORDER — DEXAMETHASONE SODIUM PHOSPHATE 4 MG/ML
INJECTION, SOLUTION INTRA-ARTICULAR; INTRALESIONAL; INTRAMUSCULAR; INTRAVENOUS; SOFT TISSUE
Status: DISPENSED
Start: 2025-07-24

## (undated) RX ORDER — KETOROLAC TROMETHAMINE 30 MG/ML
INJECTION, SOLUTION INTRAMUSCULAR; INTRAVENOUS
Status: DISPENSED
Start: 2025-07-24

## (undated) RX ORDER — OXYCODONE HYDROCHLORIDE 5 MG/1
TABLET ORAL
Status: DISPENSED
Start: 2025-07-24

## (undated) RX ORDER — BUPIVACAINE HYDROCHLORIDE AND EPINEPHRINE 5; 5 MG/ML; UG/ML
INJECTION, SOLUTION EPIDURAL; INTRACAUDAL; PERINEURAL
Status: DISPENSED
Start: 2025-07-24

## (undated) RX ORDER — ONDANSETRON 2 MG/ML
INJECTION INTRAMUSCULAR; INTRAVENOUS
Status: DISPENSED
Start: 2025-07-24

## (undated) RX ORDER — ACETAMINOPHEN 325 MG/1
TABLET ORAL
Status: DISPENSED
Start: 2025-07-24

## (undated) RX ORDER — FENTANYL CITRATE 50 UG/ML
INJECTION, SOLUTION INTRAMUSCULAR; INTRAVENOUS
Status: DISPENSED
Start: 2025-07-24

## (undated) RX ORDER — HYDROMORPHONE HYDROCHLORIDE 1 MG/ML
INJECTION, SOLUTION INTRAMUSCULAR; INTRAVENOUS; SUBCUTANEOUS
Status: DISPENSED
Start: 2025-07-24

## (undated) RX ORDER — CEFAZOLIN SODIUM 2 G/50ML
SOLUTION INTRAVENOUS
Status: DISPENSED
Start: 2025-07-24